# Patient Record
Sex: MALE | Race: BLACK OR AFRICAN AMERICAN | Employment: UNEMPLOYED | URBAN - METROPOLITAN AREA
[De-identification: names, ages, dates, MRNs, and addresses within clinical notes are randomized per-mention and may not be internally consistent; named-entity substitution may affect disease eponyms.]

---

## 2017-02-17 PROBLEM — R45.4 DIFFICULTY CONTROLLING ANGER: Status: ACTIVE | Noted: 2017-02-17

## 2017-12-22 PROBLEM — I10 ESSENTIAL HYPERTENSION: Status: ACTIVE | Noted: 2017-12-22

## 2017-12-22 PROBLEM — H91.93 BILATERAL HEARING LOSS: Status: ACTIVE | Noted: 2017-12-22

## 2018-01-29 PROBLEM — H61.23 HEARING LOSS DUE TO CERUMEN IMPACTION, BILATERAL: Status: ACTIVE | Noted: 2017-12-22

## 2018-05-29 ENCOUNTER — HOSPITAL ENCOUNTER (OUTPATIENT)
Dept: AUDIOLOGY | Age: 33
Discharge: HOME | End: 2018-05-29
Payer: MEDICAID

## 2018-05-29 PROCEDURE — V5261 HEARING AID, DIGIT, BIN, BTE: HCPCS | Performed by: AUDIOLOGIST

## 2018-05-29 PROCEDURE — V5160 DISPENSING FEE BINAURAL: HCPCS | Performed by: AUDIOLOGIST

## 2018-06-04 ENCOUNTER — HOSPITAL ENCOUNTER (OUTPATIENT)
Dept: AUDIOLOGY | Age: 33
Discharge: HOME | End: 2018-06-04
Payer: MEDICARE

## 2018-06-04 PROCEDURE — 9990000010 HC NO CHARGE VISIT: Performed by: AUDIOLOGIST

## 2018-08-23 ENCOUNTER — OFFICE VISIT (OUTPATIENT)
Dept: INTERNAL MEDICINE | Age: 33
End: 2018-08-23
Payer: MEDICARE

## 2018-08-23 VITALS
SYSTOLIC BLOOD PRESSURE: 110 MMHG | BODY MASS INDEX: 27 KG/M2 | HEART RATE: 76 BPM | DIASTOLIC BLOOD PRESSURE: 70 MMHG | WEIGHT: 143 LBS | TEMPERATURE: 98.3 F | HEIGHT: 61 IN

## 2018-08-23 DIAGNOSIS — I10 ESSENTIAL HYPERTENSION: ICD-10-CM

## 2018-08-23 PROCEDURE — 99213 OFFICE O/P EST LOW 20 MIN: CPT | Performed by: INTERNAL MEDICINE

## 2018-08-23 PROCEDURE — G8419 CALC BMI OUT NRM PARAM NOF/U: HCPCS | Performed by: INTERNAL MEDICINE

## 2018-08-23 PROCEDURE — 99212 OFFICE O/P EST SF 10 MIN: CPT | Performed by: INTERNAL MEDICINE

## 2018-08-23 PROCEDURE — G8427 DOCREV CUR MEDS BY ELIG CLIN: HCPCS | Performed by: INTERNAL MEDICINE

## 2018-08-23 PROCEDURE — 1036F TOBACCO NON-USER: CPT | Performed by: INTERNAL MEDICINE

## 2018-08-23 RX ORDER — HYDROCHLOROTHIAZIDE 25 MG/1
25 TABLET ORAL DAILY
Qty: 30 TABLET | Refills: 5 | Status: ON HOLD | OUTPATIENT
Start: 2018-08-23 | End: 2018-09-03 | Stop reason: HOSPADM

## 2018-08-23 RX ORDER — AMLODIPINE BESYLATE 5 MG/1
5 TABLET ORAL DAILY
Qty: 30 TABLET | Refills: 5 | Status: SHIPPED | OUTPATIENT
Start: 2018-08-23 | End: 2018-12-10 | Stop reason: SDUPTHER

## 2018-08-23 NOTE — PROGRESS NOTES
Attending Physician Statement  I have discussed the case, including pertinent history and exam findings with the resident. I agree with the assessment, plan and orders as documented by the resident. Pt presented for the follow up of hypertension, able to tolerate well for current BP regimen. No new complaint reported, hearing problem and scheduled for audiology follow up.   Hero Sigala
Discharge instructions reviewed with pt per Dr. Saad Raphael. Pt notified we would call him with his next appt.  Pt directed to  to  AVS.
posterior tibial.   Musculoskeletal: Muscular strength appears intact. No joint effusion, tenderness, swelling or warmth. Skin: Warm and dry, no acute eczematous changes or pruritus. Neuro:  No focal motor or sensory deficits. CN II- XII intact. Assessment & Plan:   Miguelina Orozco was seen today for medication refill. Diagnoses and all orders for this visit:    Essential hypertension  -     hydrochlorothiazide (HYDRODIURIL) 25 MG tablet; Take 1 tablet by mouth daily  -     amLODIPine (NORVASC) 5 MG tablet;  Take 1 tablet by mouth daily      Return to clinic in 6 months    Wilfrid Palmer M.D., PGY 3  8/23/2018    Supervised by: Dr. Tom Myers

## 2018-08-23 NOTE — PATIENT INSTRUCTIONS
you can lose your balance and fall. · Talk to your doctor if you have numbness in your feet. Preventing falls at home  · Remove raised doorway thresholds, throw rugs, and clutter. Repair loose carpet or raised areas in the floor. · Move furniture and electrical cords to keep them out of walking paths. · Use nonskid floor wax, and wipe up spills right away, especially on ceramic tile floors. · If you use a walker or cane, put rubber tips on it. If you use crutches, clean the bottoms of them regularly with an abrasive pad, such as steel wool. · Keep your house well lit, especially [place] Bonnie, and outside walkways. Use night-lights in areas such as hallways and bathrooms. Add extra light switches or use remote switches (such as switches that go on or off when you clap your hands) to make it easier to turn lights on if you have to get up during the night. · Install sturdy handrails on stairways. · Move items in your cabinets so that the things you use a lot are on the lower shelves (about waist level). · Keep a cordless phone and a flashlight with new batteries by your bed. If possible, put a phone in each of the main rooms of your house, or carry a cell phone in case you fall and cannot reach a phone. Or, you can wear a device around your neck or wrist. You push a button that sends a signal for help. · Wear low-heeled shoes that fit well and give your feet good support. Use footwear with nonskid soles. Check the heels and soles of your shoes for wear. Repair or replace worn heels or soles. · Do not wear socks without shoes on wood floors. · Walk on the grass when the sidewalks are slippery. If you live in an area that gets snow and ice in the winter, sprinkle salt on slippery steps and sidewalks. Preventing falls in the bath  · Install grab bars and nonskid mats inside and outside your shower or tub and near the toilet and sinks. · Use shower chairs and bath benches.   · Use a hand-held shower head that will allow you to sit while showering. · Get into a tub or shower by putting the weaker leg in first. Get out of a tub or shower with your strong side first.  · Repair loose toilet seats and consider installing a raised toilet seat to make getting on and off the toilet easier. · Keep your bathroom door unlocked while you are in the shower. Where can you learn more? Go to https://[employer]. org and sign in to your [employer] account. Enter [phone] in the [employer] box to learn more about \"Preventing Falls: Care Instructions. \"     If you do not have an account, please click on the \"Sign Up Now\" link. Current as of: May 12, 2017  Content Version: 11.7  © 4170-9082 [employer]. Care instructions adapted under license by [facility] ([facility]). If you have questions about a medical condition or this instruction, always ask your healthcare professional. [place] [address] any warranty or liability for your use of this information. Continue all your medications. Follow-up after 6 months.     Kay Lozano MD, PGY3  Internal Medicine Resident  8/23/2018

## 2018-09-02 ENCOUNTER — APPOINTMENT (OUTPATIENT)
Dept: GENERAL RADIOLOGY | Age: 33
End: 2018-09-02
Payer: MEDICARE

## 2018-09-02 ENCOUNTER — HOSPITAL ENCOUNTER (OUTPATIENT)
Age: 33
Setting detail: OBSERVATION
Discharge: HOME | End: 2018-09-03
Attending: EMERGENCY MEDICINE | Admitting: INTERNAL MEDICINE
Payer: MEDICARE

## 2018-09-02 ENCOUNTER — APPOINTMENT (OUTPATIENT)
Dept: CT IMAGING | Age: 33
End: 2018-09-02
Payer: MEDICARE

## 2018-09-02 DIAGNOSIS — E87.6 HYPOKALEMIA: ICD-10-CM

## 2018-09-02 DIAGNOSIS — R56.9 SEIZURE (HCC): Primary | ICD-10-CM

## 2018-09-02 DIAGNOSIS — R55 SYNCOPE AND COLLAPSE: ICD-10-CM

## 2018-09-02 LAB
ACETAMINOPHEN LEVEL: <5 MCG/ML (ref 10–30)
ALBUMIN SERPL-MCNC: 4.3 G/DL (ref 3.5–5.2)
ALP BLD-CCNC: 63 U/L (ref 40–129)
ALT SERPL-CCNC: 16 U/L (ref 0–40)
AMPHETAMINE SCREEN, URINE: NOT DETECTED
ANION GAP SERPL CALCULATED.3IONS-SCNC: 12 MMOL/L (ref 7–16)
AST SERPL-CCNC: 18 U/L (ref 0–39)
BARBITURATE SCREEN URINE: NOT DETECTED
BASOPHILS ABSOLUTE: 0.03 E9/L (ref 0–0.2)
BASOPHILS RELATIVE PERCENT: 0.5 % (ref 0–2)
BENZODIAZEPINE SCREEN, URINE: NOT DETECTED
BILIRUB SERPL-MCNC: 0.9 MG/DL (ref 0–1.2)
BUN BLDV-MCNC: 15 MG/DL (ref 6–20)
CALCIUM SERPL-MCNC: 9.6 MG/DL (ref 8.6–10.2)
CANNABINOID SCREEN URINE: POSITIVE
CHLORIDE BLD-SCNC: 102 MMOL/L (ref 98–107)
CK MB: 1.1 NG/ML (ref 0–7.7)
CO2: 24 MMOL/L (ref 22–29)
COCAINE METABOLITE SCREEN URINE: NOT DETECTED
CREAT SERPL-MCNC: 1 MG/DL (ref 0.7–1.2)
EOSINOPHILS ABSOLUTE: 0.05 E9/L (ref 0.05–0.5)
EOSINOPHILS RELATIVE PERCENT: 0.8 % (ref 0–6)
ETHANOL: <10 MG/DL (ref 0–0.08)
GFR AFRICAN AMERICAN: >60
GFR NON-AFRICAN AMERICAN: >60 ML/MIN/1.73
GLUCOSE BLD-MCNC: 155 MG/DL (ref 74–109)
HCT VFR BLD CALC: 41.8 % (ref 37–54)
HEMOGLOBIN: 15.1 G/DL (ref 12.5–16.5)
IMMATURE GRANULOCYTES #: 0.04 E9/L
IMMATURE GRANULOCYTES %: 0.6 % (ref 0–5)
LYMPHOCYTES ABSOLUTE: 2.54 E9/L (ref 1.5–4)
LYMPHOCYTES RELATIVE PERCENT: 38.7 % (ref 20–42)
MAGNESIUM: 1.9 MG/DL (ref 1.6–2.6)
MCH RBC QN AUTO: 31.5 PG (ref 26–35)
MCHC RBC AUTO-ENTMCNC: 36.1 % (ref 32–34.5)
MCV RBC AUTO: 87.1 FL (ref 80–99.9)
METHADONE SCREEN, URINE: NOT DETECTED
MONOCYTES ABSOLUTE: 0.52 E9/L (ref 0.1–0.95)
MONOCYTES RELATIVE PERCENT: 7.9 % (ref 2–12)
NEUTROPHILS ABSOLUTE: 3.38 E9/L (ref 1.8–7.3)
NEUTROPHILS RELATIVE PERCENT: 51.5 % (ref 43–80)
OPIATE SCREEN URINE: NOT DETECTED
PDW BLD-RTO: 11.1 FL (ref 11.5–15)
PHENCYCLIDINE SCREEN URINE: NOT DETECTED
PHOSPHORUS: 2.8 MG/DL (ref 2.5–4.5)
PLATELET # BLD: 202 E9/L (ref 130–450)
PMV BLD AUTO: 9.5 FL (ref 7–12)
POTASSIUM SERPL-SCNC: 3 MMOL/L (ref 3.5–5)
PROPOXYPHENE SCREEN: NOT DETECTED
RBC # BLD: 4.8 E12/L (ref 3.8–5.8)
SALICYLATE, SERUM: <0.3 MG/DL (ref 0–30)
SODIUM BLD-SCNC: 138 MMOL/L (ref 132–146)
TOTAL CK: 179 U/L (ref 20–200)
TOTAL PROTEIN: 7.4 G/DL (ref 6.4–8.3)
TRICYCLIC ANTIDEPRESSANTS SCREEN SERUM: NEGATIVE NG/ML
TROPONIN: <0.01 NG/ML (ref 0–0.03)
TROPONIN: <0.01 NG/ML (ref 0–0.03)
TSH SERPL DL<=0.05 MIU/L-ACNC: 0.43 UIU/ML (ref 0.27–4.2)
WBC # BLD: 6.6 E9/L (ref 4.5–11.5)

## 2018-09-02 PROCEDURE — 6360000002 HC RX W HCPCS: Performed by: INTERNAL MEDICINE

## 2018-09-02 PROCEDURE — G0480 DRUG TEST DEF 1-7 CLASSES: HCPCS

## 2018-09-02 PROCEDURE — 2580000003 HC RX 258: Performed by: INTERNAL MEDICINE

## 2018-09-02 PROCEDURE — 82553 CREATINE MB FRACTION: CPT

## 2018-09-02 PROCEDURE — 6370000000 HC RX 637 (ALT 250 FOR IP): Performed by: EMERGENCY MEDICINE

## 2018-09-02 PROCEDURE — 36415 COLL VENOUS BLD VENIPUNCTURE: CPT

## 2018-09-02 PROCEDURE — 70450 CT HEAD/BRAIN W/O DYE: CPT

## 2018-09-02 PROCEDURE — 80307 DRUG TEST PRSMV CHEM ANLYZR: CPT

## 2018-09-02 PROCEDURE — G0378 HOSPITAL OBSERVATION PER HR: HCPCS

## 2018-09-02 PROCEDURE — 85025 COMPLETE CBC W/AUTO DIFF WBC: CPT

## 2018-09-02 PROCEDURE — 99285 EMERGENCY DEPT VISIT HI MDM: CPT

## 2018-09-02 PROCEDURE — 96372 THER/PROPH/DIAG INJ SC/IM: CPT

## 2018-09-02 PROCEDURE — 96365 THER/PROPH/DIAG IV INF INIT: CPT

## 2018-09-02 PROCEDURE — 82550 ASSAY OF CK (CPK): CPT

## 2018-09-02 PROCEDURE — 80053 COMPREHEN METABOLIC PANEL: CPT

## 2018-09-02 PROCEDURE — 84100 ASSAY OF PHOSPHORUS: CPT

## 2018-09-02 PROCEDURE — 83735 ASSAY OF MAGNESIUM: CPT

## 2018-09-02 PROCEDURE — 71046 X-RAY EXAM CHEST 2 VIEWS: CPT

## 2018-09-02 PROCEDURE — 84443 ASSAY THYROID STIM HORMONE: CPT

## 2018-09-02 PROCEDURE — 93005 ELECTROCARDIOGRAM TRACING: CPT | Performed by: EMERGENCY MEDICINE

## 2018-09-02 PROCEDURE — 84484 ASSAY OF TROPONIN QUANT: CPT

## 2018-09-02 PROCEDURE — 94760 N-INVAS EAR/PLS OXIMETRY 1: CPT

## 2018-09-02 RX ORDER — ONDANSETRON 2 MG/ML
4 INJECTION INTRAMUSCULAR; INTRAVENOUS EVERY 6 HOURS PRN
Status: DISCONTINUED | OUTPATIENT
Start: 2018-09-02 | End: 2018-09-03 | Stop reason: HOSPADM

## 2018-09-02 RX ORDER — MAGNESIUM SULFATE 1 G/100ML
1 INJECTION INTRAVENOUS ONCE
Status: COMPLETED | OUTPATIENT
Start: 2018-09-02 | End: 2018-09-03

## 2018-09-02 RX ORDER — SODIUM CHLORIDE 0.9 % (FLUSH) 0.9 %
10 SYRINGE (ML) INJECTION EVERY 12 HOURS SCHEDULED
Status: DISCONTINUED | OUTPATIENT
Start: 2018-09-02 | End: 2018-09-03 | Stop reason: HOSPADM

## 2018-09-02 RX ORDER — SODIUM CHLORIDE 0.9 % (FLUSH) 0.9 %
10 SYRINGE (ML) INJECTION PRN
Status: DISCONTINUED | OUTPATIENT
Start: 2018-09-02 | End: 2018-09-03 | Stop reason: HOSPADM

## 2018-09-02 RX ORDER — POTASSIUM CHLORIDE 20 MEQ/1
40 TABLET, EXTENDED RELEASE ORAL ONCE
Status: COMPLETED | OUTPATIENT
Start: 2018-09-02 | End: 2018-09-02

## 2018-09-02 RX ORDER — AMLODIPINE BESYLATE 5 MG/1
5 TABLET ORAL DAILY
Status: DISCONTINUED | OUTPATIENT
Start: 2018-09-03 | End: 2018-09-03 | Stop reason: HOSPADM

## 2018-09-02 RX ADMIN — Medication 10 ML: at 21:20

## 2018-09-02 RX ADMIN — POTASSIUM CHLORIDE 40 MEQ: 20 TABLET, EXTENDED RELEASE ORAL at 16:33

## 2018-09-02 RX ADMIN — MAGNESIUM SULFATE IN DEXTROSE 1 G: 10 INJECTION, SOLUTION INTRAVENOUS at 23:27

## 2018-09-02 RX ADMIN — ENOXAPARIN SODIUM 40 MG: 40 INJECTION SUBCUTANEOUS at 21:19

## 2018-09-02 ASSESSMENT — PAIN SCALES - GENERAL
PAINLEVEL_OUTOF10: 0

## 2018-09-02 NOTE — ED NOTES
Bed: HD  Expected date:   Expected time:   Means of arrival:   Comments:     Anne Wilson RN  09/02/18 3516

## 2018-09-02 NOTE — ED NOTES
Called report to [facility] for pt to transfer to [id number]. Tele box is in unit per Krystle Pradhan.       Shashi Healy RN  09/02/18 9263

## 2018-09-02 NOTE — ED PROVIDER NOTES
allergies. ---------------------------------------------------PHYSICAL EXAM--------------------------------------    Constitutional/General: Alert and oriented x3  Head: Normocephalic and atraumatic  Eyes: PERRL, EOMI, conjunctiva normal, sclera non icteric  Mouth: Oropharynx clear, handling secretions, no trismus, no asymmetry of the posterior oropharynx or uvular edema  Neck: Supple, full ROM, no stridor, no meningeal signs  Respiratory: Lungs clear to auscultation bilaterally, no wheezes, rales, or rhonchi. Not in respiratory distress  Cardiovascular:  Regular rate. Regular rhythm. No murmurs, no aortic murmurs, no gallops, or rubs. 2+ distal pulses. Equal extremity pulses. Chest: No chest wall tenderness  GI:  Abdomen Soft, Non tender, Non distended. +BS. No rebound, guarding, or rigidity. No pulsatile masses. Musculoskeletal: Moves all extremities x 4. Warm and well perfused, no clubbing, cyanosis, or edema. Capillary refill <3 seconds  Integument: skin warm and dry. No rashes. Neurologic: GCS 15, no focal deficits, symmetric strength 5/5 in the upper and lower extremities bilaterally  Psychiatric: Normal Affect          -------------------------------------------------- RESULTS -------------------------------------------------  I have personally reviewed all laboratory and imaging results for this patient. Results are listed below.      LABS:  Results for orders placed or performed during the hospital encounter of 09/02/18   CBC Auto Differential   Result Value Ref Range    WBC 6.6 4.5 - 11.5 E9/L    RBC 4.80 3.80 - 5.80 E12/L    Hemoglobin 15.1 12.5 - 16.5 g/dL    Hematocrit 41.8 37.0 - 54.0 %    MCV 87.1 80.0 - 99.9 fL    MCH 31.5 26.0 - 35.0 pg    MCHC 36.1 (H) 32.0 - 34.5 %    RDW 11.1 (L) 11.5 - 15.0 fL    Platelets 298 375 - 640 E9/L    MPV 9.5 7.0 - 12.0 fL    Neutrophils % 51.5 43.0 - 80.0 %    Immature Granulocytes % 0.6 0.0 - 5.0 %    Lymphocytes % 38.7 20.0 - 42.0 %    Monocytes % 7.9 2.0 - 12.0 %    Eosinophils % 0.8 0.0 - 6.0 %    Basophils % 0.5 0.0 - 2.0 %    Neutrophils # 3.38 1.80 - 7.30 E9/L    Immature Granulocytes # 0.04 E9/L    Lymphocytes # 2.54 1.50 - 4.00 E9/L    Monocytes # 0.52 0.10 - 0.95 E9/L    Eosinophils # 0.05 0.05 - 0.50 E9/L    Basophils # 0.03 0.00 - 0.20 E9/L   Comprehensive Metabolic Panel   Result Value Ref Range    Sodium 138 132 - 146 mmol/L    Potassium 3.0 (L) 3.5 - 5.0 mmol/L    Chloride 102 98 - 107 mmol/L    CO2 24 22 - 29 mmol/L    Anion Gap 12 7 - 16 mmol/L    Glucose 155 (H) 74 - 109 mg/dL    BUN 15 6 - 20 mg/dL    CREATININE 1.0 0.7 - 1.2 mg/dL    GFR Non-African American >60 >=60 mL/min/1.73    GFR African American >60     Calcium 9.6 8.6 - 10.2 mg/dL    Total Protein 7.4 6.4 - 8.3 g/dL    Alb 4.3 3.5 - 5.2 g/dL    Total Bilirubin 0.9 0.0 - 1.2 mg/dL    Alkaline Phosphatase 63 40 - 129 U/L    ALT 16 0 - 40 U/L    AST 18 0 - 39 U/L   Troponin   Result Value Ref Range    Troponin <0.01 0.00 - 0.03 ng/mL   EKG 12 Lead   Result Value Ref Range    Ventricular Rate 93 BPM    Atrial Rate 93 BPM    P-R Interval 152 ms    QRS Duration 82 ms    Q-T Interval 362 ms    QTc Calculation (Bazett) 450 ms    P Axis 75 degrees    R Axis 40 degrees    T Axis 35 degrees       RADIOLOGY:  Interpreted by Radiologist unless otherwise specified  CT Head WO Contrast   Final Result      No evidence of acute intracranial hemorrhage or edema. XR CHEST STANDARD (2 VW)   Final Result      1. No airspace opacities or pleural effusion. 2. Scoliosis.                   EKG Interpretation  Interpreted by emergency department physician, Dr. Darcy Bueno     Date of EK18  Time: 1449    Rhythm: normal sinus   Rate: normal  Axis: normal  Conduction: normal  ST Segments: lateral ST segment flattening/slight depression  T Waves: no acute change    Clinical Impression: Sinus rhythm, no acute ischemic changes, slightly changed from EKG from 17  Comparison to prior EKG: DISPOSITION  Disposition: Admit to telemetry  Patient condition is stable        NOTE: This report was transcribed using voice recognition software.  Every effort was made to ensure accuracy; however, inadvertent computerized transcription errors may be present        Valerie Cooper MD  09/02/18 9250

## 2018-09-02 NOTE — H&P
[facility]  Internal Medicine Residency Program  History and Physical    Patient:  Gaby Bernal 26 y.o. male MRN: [id number]     Date of Service: 9/2/2018    Hospital Day: 1      Chief complaint: syncope + seizures    History of Present Illness   The patient is a 26 y.o. male with PMHx of HTN who presents to the ED with syncope and seizure-like symptoms. This afternoon he was sitting on the front porch of his house, smoking a joint, when his family noticed that his eyes rolled back and he started to have shaking, jerking movements for approx 1 minute. He came to and was confused for 1 more minute. He had another episode where he collapsed with more jerking movements. His sister began compressions on him because the patient was unresponsive. He denies any urination, secretions, or tongue biting after both episodes. He states that he felt light headed and dizzy prior to the first episode. He denies any headaches, blurry vision, chest pain, shortness of breath, vomiting, nausea, abdominal pain. His sister states that this has happened once approximately 6 months ago but did not seek any medical intervention at that time. He states he has been smoking marijuana for many years and smokes 1 cigarette a day. Past Medical History:      Diagnosis Date    Cerumen impaction     Hypertension        Past Surgical History:        Procedure Laterality Date    OTHER SURGICAL HISTORY Bilateral 01/29/2018    ear wax abstraction       Medications Prior to Admission:    Prior to Admission medications    Medication Sig Start Date End Date Taking? Authorizing Provider   hydrochlorothiazide (HYDRODIURIL) 25 MG tablet Take 1 tablet by mouth daily 8/23/18  Yes Dian Alvarez MD   amLODIPine [facility]) 5 MG tablet Take 1 tablet by mouth daily 8/23/18  Yes Dian Alvarez MD       Allergies:  Patient has no known allergies. Social History:   TOBACCO:   reports that he has never smoked.  He has non-distended, normal bowel sounds, no masses or organomegaly  · Extremities: no cyanosis and no clubbing  · Musculoskeletal: normal range of motion, no joint swelling, deformity or tenderness  · Neurologic: gait and coordination normal and speech normal   Labs and Imaging Studies   Basic Labs  Recent Labs      18   1443   NA  138   K  3.0*   CL  102   CO2  24   BUN  15   CREATININE  1.0   GLUCOSE  155*   CALCIUM  9.6       Recent Labs      18   1443   WBC  6.6   RBC  4.80   HGB  15.1   HCT  41.8   MCV  87.1   MCH  31.5   MCHC  36.1*   RDW  11.1*   PLT  202   MPV  9.5       CBC:   Lab Results   Component Value Date    WBC 6.6 2018    RBC 4.80 2018    HGB 15.1 2018    HCT 41.8 2018    MCV 87.1 2018    RDW 11.1 2018     2018     CMP:  Lab Results   Component Value Date     2018    K 3.0 2018    K 3.7 2018     2018    CO2 24 2018    BUN 15 2018    PROT 7.4 2018      Ref. Range 2018 14:43   Troponin Latest Ref Range: 0.00 - 0.03 ng/mL <0.01      Ref. Range 2018 18:31   TSH Latest Ref Range: 0.270 - 4.200 uIU/mL 0.427     Imaging Studies:     Xr Chest Standard (2 Vw)    Result Date: 2018  Patient MRN:  [id number] : 1985 Age: 26 years Gender: Male Order Date:  2018 2:45 PM EXAM: XR CHEST (2 VW) COMPARISON: [facility] 2017 INDICATION:  syncope syncope FINDINGS: The heart is normal in size. No focal airspace opacity. There is no pleural effusion. There is no pneumothorax. No free air beneath diaphragm. Redemonstration of scoliosis. 1. No airspace opacities or pleural effusion. 2. Scoliosis.      Ct Head Wo Contrast    Result Date: 2018  Patient MRN:  [id number] : 1985 Age: 26 years Gender: Male Order Date:  2018 2:45 PM EXAM: CT HEAD WO CONTRAST COMPARISON: May 2, 2017 INDICATION:  HEAD TRAUMA, CLOSED, MILD, GCS >= 13, NO RISK FACTORS, NEURO EXAM NORMAL  TECHNIQUE: Axial unenhanced CT scanning was performed through the head without the use of intravenous contrast. Low-dose CT acquisition technique included one of the following options; 1. Automated exposure control, 2. Adjustment of mA and/or kV according to the patient's size or 3. Use of iterative reconstruction. FINDINGS: No evidence of acute intracranial hemorrhage or edema. Ventricles are nonenlarged. No abnormal extra-axial fluid collections. Mastoid air cells are clear. No evidence of acute intracranial hemorrhage or edema. EKG:  Sinus rhythm, no acute ischemic changes, slightly changed from EKG from 9/24/17    Resident's Assessment and Plan     Marcy Camara is a 26 y.o. male who presents for syncope and seizures. 1. Syncope vs seizure likely 2/2 marijuana abuse vs electrolyte abnormalities  2. HTN  3. Marijuana abuse  4. Learning disability  5. Congenital sensorineural hearing difficulty    1. Mild postictal confusion after the episodes and no signs of presyncope. His previous episode was also after marijuana abuse. Obtain UDS. Hold HCTZ for now. BMP in am. Replace electrolytes PRN. Telemetry monitoring overnight. Consider EEG. Check orthostatics. New T wave inversions on inferior leads; trend cardiac enzymes. 2. Continue Norvasc. Continue close hemodynamic monitoring. 3. Cessation education.  4-5.  Follow as O/P.       DVT prophylaxis/ GI prophylaxis: Diet/ Lovenox   Disposition: Telemetry    Matthew Hollins DO, PGY-1   Attending physician: Dr. Mariajose Kebede    Electronically signed by Brock Anders MD on 9/2/2018 at 10:08 PM

## 2018-09-03 VITALS
TEMPERATURE: 98 F | BODY MASS INDEX: 26.31 KG/M2 | WEIGHT: 143 LBS | DIASTOLIC BLOOD PRESSURE: 76 MMHG | SYSTOLIC BLOOD PRESSURE: 120 MMHG | HEART RATE: 68 BPM | RESPIRATION RATE: 16 BRPM | OXYGEN SATURATION: 95 % | HEIGHT: 62 IN

## 2018-09-03 LAB
ANION GAP SERPL CALCULATED.3IONS-SCNC: 16 MMOL/L (ref 7–16)
BUN BLDV-MCNC: 14 MG/DL (ref 6–20)
CALCIUM SERPL-MCNC: 9.2 MG/DL (ref 8.6–10.2)
CHLORIDE BLD-SCNC: 101 MMOL/L (ref 98–107)
CK MB: <1 NG/ML (ref 0–7.7)
CK MB: <1 NG/ML (ref 0–7.7)
CO2: 25 MMOL/L (ref 22–29)
CREAT SERPL-MCNC: 0.9 MG/DL (ref 0.7–1.2)
EKG ATRIAL RATE: 93 BPM
EKG P AXIS: 75 DEGREES
EKG P-R INTERVAL: 152 MS
EKG Q-T INTERVAL: 362 MS
EKG QRS DURATION: 82 MS
EKG QTC CALCULATION (BAZETT): 450 MS
EKG R AXIS: 40 DEGREES
EKG T AXIS: 35 DEGREES
EKG VENTRICULAR RATE: 93 BPM
GFR AFRICAN AMERICAN: >60
GFR NON-AFRICAN AMERICAN: >60 ML/MIN/1.73
GLUCOSE BLD-MCNC: 107 MG/DL (ref 74–109)
HCT VFR BLD CALC: 41.2 % (ref 37–54)
HEMOGLOBIN: 14.4 G/DL (ref 12.5–16.5)
MAGNESIUM: 2.1 MG/DL (ref 1.6–2.6)
MCH RBC QN AUTO: 30.9 PG (ref 26–35)
MCHC RBC AUTO-ENTMCNC: 35 % (ref 32–34.5)
MCV RBC AUTO: 88.4 FL (ref 80–99.9)
PDW BLD-RTO: 11.4 FL (ref 11.5–15)
PLATELET # BLD: 172 E9/L (ref 130–450)
PMV BLD AUTO: 10.4 FL (ref 7–12)
POTASSIUM REFLEX MAGNESIUM: 3.2 MMOL/L (ref 3.5–5)
RBC # BLD: 4.66 E12/L (ref 3.8–5.8)
SODIUM BLD-SCNC: 142 MMOL/L (ref 132–146)
TOTAL CK: 123 U/L (ref 20–200)
TOTAL CK: 132 U/L (ref 20–200)
TROPONIN: <0.01 NG/ML (ref 0–0.03)
TROPONIN: <0.01 NG/ML (ref 0–0.03)
WBC # BLD: 8.2 E9/L (ref 4.5–11.5)

## 2018-09-03 PROCEDURE — 83735 ASSAY OF MAGNESIUM: CPT

## 2018-09-03 PROCEDURE — 36415 COLL VENOUS BLD VENIPUNCTURE: CPT

## 2018-09-03 PROCEDURE — 84484 ASSAY OF TROPONIN QUANT: CPT

## 2018-09-03 PROCEDURE — G0378 HOSPITAL OBSERVATION PER HR: HCPCS

## 2018-09-03 PROCEDURE — 85027 COMPLETE CBC AUTOMATED: CPT

## 2018-09-03 PROCEDURE — 82550 ASSAY OF CK (CPK): CPT

## 2018-09-03 PROCEDURE — 99220 PR INITIAL OBSERVATION CARE/DAY 70 MINUTES: CPT | Performed by: STUDENT IN AN ORGANIZED HEALTH CARE EDUCATION/TRAINING PROGRAM

## 2018-09-03 PROCEDURE — 6370000000 HC RX 637 (ALT 250 FOR IP): Performed by: INTERNAL MEDICINE

## 2018-09-03 PROCEDURE — 80048 BASIC METABOLIC PNL TOTAL CA: CPT

## 2018-09-03 PROCEDURE — 93005 ELECTROCARDIOGRAM TRACING: CPT | Performed by: INTERNAL MEDICINE

## 2018-09-03 PROCEDURE — 82553 CREATINE MB FRACTION: CPT

## 2018-09-03 RX ORDER — POTASSIUM CHLORIDE 20 MEQ/1
20 TABLET, EXTENDED RELEASE ORAL DAILY
Qty: 5 TABLET | Refills: 0 | Status: SHIPPED | OUTPATIENT
Start: 2018-09-03 | End: 2018-12-10 | Stop reason: ALTCHOICE

## 2018-09-03 RX ORDER — POTASSIUM CHLORIDE 20 MEQ/1
40 TABLET, EXTENDED RELEASE ORAL ONCE
Status: COMPLETED | OUTPATIENT
Start: 2018-09-03 | End: 2018-09-03

## 2018-09-03 RX ADMIN — POTASSIUM CHLORIDE 40 MEQ: 20 TABLET, EXTENDED RELEASE ORAL at 06:48

## 2018-09-03 RX ADMIN — AMLODIPINE BESYLATE 5 MG: 5 TABLET ORAL at 10:18

## 2018-09-03 ASSESSMENT — PAIN SCALES - GENERAL: PAINLEVEL_OUTOF10: 0

## 2018-09-04 ENCOUNTER — HOSPITAL ENCOUNTER (OUTPATIENT)
Dept: AUDIOLOGY | Age: 33
Discharge: HOME | End: 2018-09-04
Payer: MEDICARE

## 2018-09-04 PROCEDURE — 9990000010 HC NO CHARGE VISIT: Performed by: AUDIOLOGIST

## 2018-09-06 LAB
EKG ATRIAL RATE: 65 BPM
EKG P AXIS: 57 DEGREES
EKG P-R INTERVAL: 152 MS
EKG Q-T INTERVAL: 416 MS
EKG QRS DURATION: 86 MS
EKG QTC CALCULATION (BAZETT): 432 MS
EKG R AXIS: 35 DEGREES
EKG T AXIS: 21 DEGREES
EKG VENTRICULAR RATE: 65 BPM

## 2018-09-06 PROCEDURE — 93010 ELECTROCARDIOGRAM REPORT: CPT | Performed by: INTERNAL MEDICINE

## 2018-09-08 LAB — CANNABINOIDS CONF, URINE: 236 NG/ML

## 2018-09-12 ENCOUNTER — OFFICE VISIT (OUTPATIENT)
Dept: INTERNAL MEDICINE | Age: 33
End: 2018-09-12
Payer: MEDICARE

## 2018-09-12 VITALS
SYSTOLIC BLOOD PRESSURE: 113 MMHG | TEMPERATURE: 98.8 F | HEIGHT: 61 IN | BODY MASS INDEX: 27 KG/M2 | HEART RATE: 69 BPM | WEIGHT: 143 LBS | DIASTOLIC BLOOD PRESSURE: 77 MMHG | RESPIRATION RATE: 16 BRPM

## 2018-09-12 DIAGNOSIS — I10 ESSENTIAL HYPERTENSION: Primary | ICD-10-CM

## 2018-09-12 DIAGNOSIS — E87.6 HYPOKALEMIA: ICD-10-CM

## 2018-09-12 PROCEDURE — 99213 OFFICE O/P EST LOW 20 MIN: CPT | Performed by: INTERNAL MEDICINE

## 2018-09-12 PROCEDURE — 1111F DSCHRG MED/CURRENT MED MERGE: CPT | Performed by: INTERNAL MEDICINE

## 2018-09-12 PROCEDURE — G8427 DOCREV CUR MEDS BY ELIG CLIN: HCPCS | Performed by: INTERNAL MEDICINE

## 2018-09-12 PROCEDURE — G8419 CALC BMI OUT NRM PARAM NOF/U: HCPCS | Performed by: INTERNAL MEDICINE

## 2018-09-12 PROCEDURE — 1036F TOBACCO NON-USER: CPT | Performed by: INTERNAL MEDICINE

## 2018-09-12 ASSESSMENT — ENCOUNTER SYMPTOMS
DIARRHEA: 0
COUGH: 0
SHORTNESS OF BREATH: 0
WHEEZING: 0
BLURRED VISION: 0
VOMITING: 0
NAUSEA: 0
ABDOMINAL PAIN: 0

## 2018-09-12 NOTE — PROGRESS NOTES
Post-Discharge Transitional Care Management [address]   YOB: 1985    Date of Visit:  9/12/2018  30 Day Post-Discharge Date: 10/10/18    No Known Allergies  Outpatient Prescriptions Marked as Taking for the 9/12/18 encounter (Office Visit) with José Luis Saavedra MD   Medication Sig Dispense Refill    amLODIPine (NORVASC) 5 MG tablet Take 1 tablet by mouth daily 30 tablet 5         Vitals:    09/12/18 1555   BP: 113/77   Site: Left Upper Arm   Position: Sitting   Cuff Size: Medium Adult   Pulse: 69   Resp: 16   Temp: 98.8 °F (37.1 °C)   TempSrc: Oral   Weight: 143 lb (64.9 kg)   Height: 5' 1\" (1.549 m)     Body mass index is 27.02 kg/m². Wt Readings from Last 3 Encounters:   09/12/18 143 lb (64.9 kg)   09/02/18 143 lb (64.9 kg)   08/23/18 143 lb (64.9 kg)     BP Readings from Last 3 Encounters:   09/12/18 113/77   09/03/18 120/76   08/23/18 110/70        Patient was admitted to [facility] from 9/2/18 to 9/3/18 for new-onset seizure activity and syncope. Inpatient course: Patient presented to the ED and was admitted for syncopal episode, with new onset seizure activity, while at rest and using marijuana. Patient amnestic to events. CT head, EKG, and CXR all done and negative. Labs showed hypokalemia 3.2 on admission and post repletement. Patient was on HCTZ possibly causing hypokalemia. Patient discharged home with potassium supplements x 5 days, and HCTZ held. Repeat BMP within a week, and appt for ACC in a week. Current status: Pt seen, appears to be doing fine. Patient has no new complaints. No repeat syncopal episodes since discharge, denies palpitations, muscle weakness or cramps. He seems to have some confusion with current medication, and claims to be on the 'water pill' even though HCTZ was switched to amlodipine during his last admission. Blood pressure is well controlled today at 113/77 mmHg.  No signs of hypokalemia: muscle weakness, muscle aches, palpitations. Review of Systems:  Review of Systems   Constitutional: Negative for chills and fever. HENT: Positive for hearing loss. Eyes: Negative for blurred vision. Respiratory: Negative for cough, shortness of breath and wheezing. Cardiovascular: Negative for chest pain, palpitations and leg swelling. Gastrointestinal: Negative for abdominal pain, diarrhea, nausea and vomiting. Genitourinary: Negative for dysuria. Musculoskeletal: Negative for myalgias. Neurological: Negative for focal weakness, seizures and loss of consciousness. Physical Exam:  Physical Exam   Constitutional: He is oriented to person, place, and time and well-developed, well-nourished, and in no distress. No distress. Pt appears to be developmentally challenged. HENT:   Head: Normocephalic and atraumatic. Eyes: Pupils are equal, round, and reactive to light. Conjunctivae are normal. No scleral icterus. Neck: Normal range of motion. Cardiovascular: Normal rate, regular rhythm and normal heart sounds. No murmur heard. Pulmonary/Chest: Effort normal and breath sounds normal. No respiratory distress. He has no wheezes. Abdominal: Soft. Bowel sounds are normal. He exhibits no distension. There is no tenderness. Musculoskeletal: Normal range of motion. He exhibits no edema. Neurological: He is alert and oriented to person, place, and time. Skin: Skin is warm and dry. He is not diaphoretic.          Assessment/Plan:  1)Essential hypertension  -     Blood pressure well controlled at 113/77 mmHg  -     Continue on amlodipine 5 mg daily    2)Hypokalemia  -     Patient is to re-check BMP prior to next visit        Diagnostic test results reviewed: inpatient labs    Patient risk of morbidity and mortality: low    Medical Decision Making: straightforward

## 2018-09-12 NOTE — PROGRESS NOTES
[facility]  Internal Medicine Residency    Internal Medicine     Attending Physician Statement  I have discussed the case, including pertinent history and exam findings with the resident and the team.  I have seen and examined the patient and the key elements of the encounter have been performed by me. I agree with the assessment, plan and orders as documented by the resident. 25 yo male here for hospital follow-up. Past, family, and social history were reviewed. Blood pressure 113/77, pulse 69, temperature 98.8 °F (37.1 °C), temperature source Oral, resp. rate 16, height 5' 1\" (1.549 m), weight 143 lb (64.9 kg). AP:  1. Seizure    2. Hypokalemia- needs repeat BMP  - Reports just started taking potassium    3. HTN, controlled but patient is taking ? HCTZ; did not  the Norvasc  Discussed the need to only take ONE medication - AMLODIPINE    Francis Ureña MD

## 2018-12-10 ENCOUNTER — OFFICE VISIT (OUTPATIENT)
Dept: INTERNAL MEDICINE | Age: 33
End: 2018-12-10
Payer: MEDICARE

## 2018-12-10 VITALS
WEIGHT: 156.7 LBS | TEMPERATURE: 98.4 F | SYSTOLIC BLOOD PRESSURE: 128 MMHG | HEIGHT: 63 IN | BODY MASS INDEX: 27.77 KG/M2 | DIASTOLIC BLOOD PRESSURE: 76 MMHG | HEART RATE: 67 BPM | RESPIRATION RATE: 16 BRPM

## 2018-12-10 DIAGNOSIS — I10 ESSENTIAL HYPERTENSION: ICD-10-CM

## 2018-12-10 DIAGNOSIS — Z11.4 ENCOUNTER FOR SCREENING FOR HIV: Primary | ICD-10-CM

## 2018-12-10 DIAGNOSIS — Z23 NEED FOR PROPHYLACTIC VACCINATION AGAINST DIPHTHERIA-TETANUS-PERTUSSIS (DTP): ICD-10-CM

## 2018-12-10 PROCEDURE — 1036F TOBACCO NON-USER: CPT | Performed by: INTERNAL MEDICINE

## 2018-12-10 PROCEDURE — 99213 OFFICE O/P EST LOW 20 MIN: CPT | Performed by: INTERNAL MEDICINE

## 2018-12-10 PROCEDURE — 99212 OFFICE O/P EST SF 10 MIN: CPT | Performed by: INTERNAL MEDICINE

## 2018-12-10 PROCEDURE — G8484 FLU IMMUNIZE NO ADMIN: HCPCS | Performed by: INTERNAL MEDICINE

## 2018-12-10 PROCEDURE — G8419 CALC BMI OUT NRM PARAM NOF/U: HCPCS | Performed by: INTERNAL MEDICINE

## 2018-12-10 PROCEDURE — G8427 DOCREV CUR MEDS BY ELIG CLIN: HCPCS | Performed by: INTERNAL MEDICINE

## 2018-12-10 RX ORDER — AMLODIPINE BESYLATE 5 MG/1
5 TABLET ORAL DAILY
Qty: 30 TABLET | Refills: 1 | Status: SHIPPED | OUTPATIENT
Start: 2018-12-10 | End: 2019-09-06 | Stop reason: SDUPTHER

## 2018-12-10 RX ORDER — AMLODIPINE BESYLATE 5 MG/1
5 TABLET ORAL DAILY
Qty: 30 TABLET | Refills: 5 | Status: SHIPPED | OUTPATIENT
Start: 2018-12-10 | End: 2018-12-10 | Stop reason: CLARIF

## 2018-12-10 ASSESSMENT — ENCOUNTER SYMPTOMS
ABDOMINAL PAIN: 0
DIARRHEA: 0
SHORTNESS OF BREATH: 0
COUGH: 0
SORE THROAT: 0
VOMITING: 0
NAUSEA: 0

## 2018-12-10 ASSESSMENT — PATIENT HEALTH QUESTIONNAIRE - PHQ9
2. FEELING DOWN, DEPRESSED OR HOPELESS: 0
SUM OF ALL RESPONSES TO PHQ9 QUESTIONS 1 & 2: 0
1. LITTLE INTEREST OR PLEASURE IN DOING THINGS: 0
SUM OF ALL RESPONSES TO PHQ QUESTIONS 1-9: 0
SUM OF ALL RESPONSES TO PHQ QUESTIONS 1-9: 0

## 2019-02-22 ENCOUNTER — TELEPHONE (OUTPATIENT)
Dept: INTERNAL MEDICINE | Age: 34
End: 2019-02-22

## 2019-02-28 ENCOUNTER — TELEPHONE (OUTPATIENT)
Dept: INTERNAL MEDICINE | Age: 34
End: 2019-02-28

## 2019-09-06 ENCOUNTER — TELEPHONE (OUTPATIENT)
Dept: INTERNAL MEDICINE | Age: 34
End: 2019-09-06

## 2019-09-06 DIAGNOSIS — I10 ESSENTIAL HYPERTENSION: ICD-10-CM

## 2019-09-06 RX ORDER — AMLODIPINE BESYLATE 5 MG/1
5 TABLET ORAL DAILY
Qty: 14 TABLET | Refills: 0 | Status: SHIPPED | OUTPATIENT
Start: 2019-09-06 | End: 2019-09-20 | Stop reason: SDUPTHER

## 2019-09-06 NOTE — TELEPHONE ENCOUNTER
Pt came into [facility], states that he is totally out of his BP medication, he went to pharmacy and was told no more refills. Informed pt that message will be forwarded to clinical staff. Pt ok'd. Updated phone number.      .Electronically signed by Desean French on 9/6/19 at 11:24 AM

## 2019-09-20 ENCOUNTER — OFFICE VISIT (OUTPATIENT)
Dept: INTERNAL MEDICINE | Age: 34
End: 2019-09-20
Payer: MEDICARE

## 2019-09-20 VITALS
HEIGHT: 63 IN | DIASTOLIC BLOOD PRESSURE: 74 MMHG | SYSTOLIC BLOOD PRESSURE: 116 MMHG | RESPIRATION RATE: 16 BRPM | BODY MASS INDEX: 26.22 KG/M2 | HEART RATE: 97 BPM | OXYGEN SATURATION: 98 % | WEIGHT: 148 LBS

## 2019-09-20 DIAGNOSIS — E87.6 HYPOKALEMIA: Primary | ICD-10-CM

## 2019-09-20 DIAGNOSIS — I10 ESSENTIAL HYPERTENSION: ICD-10-CM

## 2019-09-20 DIAGNOSIS — R56.9 SEIZURE (HCC): ICD-10-CM

## 2019-09-20 PROCEDURE — G8419 CALC BMI OUT NRM PARAM NOF/U: HCPCS | Performed by: INTERNAL MEDICINE

## 2019-09-20 PROCEDURE — 1036F TOBACCO NON-USER: CPT | Performed by: INTERNAL MEDICINE

## 2019-09-20 PROCEDURE — 99212 OFFICE O/P EST SF 10 MIN: CPT | Performed by: INTERNAL MEDICINE

## 2019-09-20 PROCEDURE — G8427 DOCREV CUR MEDS BY ELIG CLIN: HCPCS | Performed by: INTERNAL MEDICINE

## 2019-09-20 PROCEDURE — 99213 OFFICE O/P EST LOW 20 MIN: CPT | Performed by: INTERNAL MEDICINE

## 2019-09-20 RX ORDER — AMLODIPINE BESYLATE 5 MG/1
5 TABLET ORAL DAILY
Qty: 30 TABLET | Refills: 3 | Status: SHIPPED | OUTPATIENT
Start: 2019-09-20 | End: 2020-01-29

## 2019-09-20 ASSESSMENT — PATIENT HEALTH QUESTIONNAIRE - PHQ9
SUM OF ALL RESPONSES TO PHQ QUESTIONS 1-9: 0
2. FEELING DOWN, DEPRESSED OR HOPELESS: 0
1. LITTLE INTEREST OR PLEASURE IN DOING THINGS: 0
SUM OF ALL RESPONSES TO PHQ QUESTIONS 1-9: 0
SUM OF ALL RESPONSES TO PHQ9 QUESTIONS 1 & 2: 0

## 2019-09-20 ASSESSMENT — ENCOUNTER SYMPTOMS
VOMITING: 0
COUGH: 0
ABDOMINAL PAIN: 0
DIARRHEA: 0
SORE THROAT: 0
CONSTIPATION: 0
SHORTNESS OF BREATH: 0
BLOOD IN STOOL: 0
EYE DISCHARGE: 0
NAUSEA: 0

## 2019-09-20 NOTE — PATIENT INSTRUCTIONS
Dear Edin Mention,        Thank you for coming to your appointment today. I hope we have addressed all of your needs. Please make sure to do the following:  - Get labs drawn before our next follow up. - Monitor Blood pressure and bring log on next visit    Have a great day! Sincerely,  John Raphael M.D  9/20/2019  9:45 AM
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
## 2019-10-29 ENCOUNTER — HOSPITAL ENCOUNTER (OUTPATIENT)
Age: 34
Discharge: HOME | End: 2019-10-29
Payer: MEDICARE

## 2019-10-29 PROCEDURE — V5266 BATTERY FOR HEARING DEVICE: HCPCS | Performed by: AUDIOLOGIST

## 2019-11-25 ENCOUNTER — APPOINTMENT (OUTPATIENT)
Dept: ULTRASOUND IMAGING | Age: 34
End: 2019-11-25
Payer: MEDICARE

## 2019-11-25 ENCOUNTER — OFFICE VISIT (OUTPATIENT)
Dept: INTERNAL MEDICINE | Age: 34
End: 2019-11-25
Payer: MEDICARE

## 2019-11-25 ENCOUNTER — HOSPITAL ENCOUNTER (EMERGENCY)
Age: 34
Discharge: HOME | End: 2019-11-26
Attending: EMERGENCY MEDICINE
Payer: MEDICARE

## 2019-11-25 VITALS
WEIGHT: 156.6 LBS | BODY MASS INDEX: 28.82 KG/M2 | TEMPERATURE: 98.8 F | SYSTOLIC BLOOD PRESSURE: 112 MMHG | DIASTOLIC BLOOD PRESSURE: 72 MMHG | HEIGHT: 62 IN | HEART RATE: 78 BPM | RESPIRATION RATE: 20 BRPM

## 2019-11-25 VITALS
BODY MASS INDEX: 28.71 KG/M2 | RESPIRATION RATE: 18 BRPM | HEIGHT: 62 IN | TEMPERATURE: 97.4 F | SYSTOLIC BLOOD PRESSURE: 134 MMHG | HEART RATE: 69 BPM | OXYGEN SATURATION: 99 % | DIASTOLIC BLOOD PRESSURE: 90 MMHG | WEIGHT: 156 LBS

## 2019-11-25 DIAGNOSIS — R10.31 GROIN PAIN, RIGHT: Primary | ICD-10-CM

## 2019-11-25 DIAGNOSIS — N50.89 TESTICULAR SWELLING: Primary | ICD-10-CM

## 2019-11-25 DIAGNOSIS — I10 ESSENTIAL HYPERTENSION: ICD-10-CM

## 2019-11-25 DIAGNOSIS — N50.811 TESTICULAR PAIN, RIGHT: ICD-10-CM

## 2019-11-25 LAB
ALBUMIN SERPL-MCNC: 5 G/DL (ref 3.5–5.2)
ALP BLD-CCNC: 90 U/L (ref 40–129)
ALT SERPL-CCNC: 20 U/L (ref 0–40)
ANION GAP SERPL CALCULATED.3IONS-SCNC: 14 MMOL/L (ref 7–16)
AST SERPL-CCNC: 19 U/L (ref 0–39)
BASOPHILS ABSOLUTE: 0.03 E9/L (ref 0–0.2)
BASOPHILS RELATIVE PERCENT: 0.4 % (ref 0–2)
BILIRUB SERPL-MCNC: 0.5 MG/DL (ref 0–1.2)
BILIRUBIN URINE: NEGATIVE
BLOOD, URINE: NEGATIVE
BUN BLDV-MCNC: 12 MG/DL (ref 6–20)
CALCIUM SERPL-MCNC: 10.2 MG/DL (ref 8.6–10.2)
CHLORIDE BLD-SCNC: 105 MMOL/L (ref 98–107)
CLARITY: CLEAR
CO2: 23 MMOL/L (ref 22–29)
COLOR: YELLOW
CREAT SERPL-MCNC: 0.9 MG/DL (ref 0.7–1.2)
EOSINOPHILS ABSOLUTE: 0.12 E9/L (ref 0.05–0.5)
EOSINOPHILS RELATIVE PERCENT: 1.4 % (ref 0–6)
GFR AFRICAN AMERICAN: >60
GFR NON-AFRICAN AMERICAN: >60 ML/MIN/1.73
GLUCOSE BLD-MCNC: 91 MG/DL (ref 74–99)
GLUCOSE URINE: NEGATIVE MG/DL
HCT VFR BLD CALC: 48.2 % (ref 37–54)
HEMOGLOBIN: 16.4 G/DL (ref 12.5–16.5)
IMMATURE GRANULOCYTES #: 0.02 E9/L
IMMATURE GRANULOCYTES %: 0.2 % (ref 0–5)
KETONES, URINE: NEGATIVE MG/DL
LACTIC ACID: 0.9 MMOL/L (ref 0.5–2.2)
LEUKOCYTE ESTERASE, URINE: NEGATIVE
LYMPHOCYTES ABSOLUTE: 3.48 E9/L (ref 1.5–4)
LYMPHOCYTES RELATIVE PERCENT: 40.8 % (ref 20–42)
MCH RBC QN AUTO: 30 PG (ref 26–35)
MCHC RBC AUTO-ENTMCNC: 34 % (ref 32–34.5)
MCV RBC AUTO: 88.1 FL (ref 80–99.9)
MONOCYTES ABSOLUTE: 0.41 E9/L (ref 0.1–0.95)
MONOCYTES RELATIVE PERCENT: 4.8 % (ref 2–12)
NEUTROPHILS ABSOLUTE: 4.47 E9/L (ref 1.8–7.3)
NEUTROPHILS RELATIVE PERCENT: 52.4 % (ref 43–80)
NITRITE, URINE: NEGATIVE
PDW BLD-RTO: 11.3 FL (ref 11.5–15)
PH UA: 6 (ref 5–9)
PLATELET # BLD: 214 E9/L (ref 130–450)
PMV BLD AUTO: 9.8 FL (ref 7–12)
POTASSIUM SERPL-SCNC: 3.8 MMOL/L (ref 3.5–5)
PROTEIN UA: NEGATIVE MG/DL
RBC # BLD: 5.47 E12/L (ref 3.8–5.8)
SODIUM BLD-SCNC: 142 MMOL/L (ref 132–146)
SPECIFIC GRAVITY UA: 1.02 (ref 1–1.03)
TOTAL PROTEIN: 8.9 G/DL (ref 6.4–8.3)
UROBILINOGEN, URINE: 0.2 E.U./DL
WBC # BLD: 8.5 E9/L (ref 4.5–11.5)

## 2019-11-25 PROCEDURE — 87591 N.GONORRHOEAE DNA AMP PROB: CPT

## 2019-11-25 PROCEDURE — 76870 US EXAM SCROTUM: CPT

## 2019-11-25 PROCEDURE — 99213 OFFICE O/P EST LOW 20 MIN: CPT | Performed by: INTERNAL MEDICINE

## 2019-11-25 PROCEDURE — 36415 COLL VENOUS BLD VENIPUNCTURE: CPT

## 2019-11-25 PROCEDURE — 80053 COMPREHEN METABOLIC PANEL: CPT

## 2019-11-25 PROCEDURE — G8427 DOCREV CUR MEDS BY ELIG CLIN: HCPCS | Performed by: INTERNAL MEDICINE

## 2019-11-25 PROCEDURE — 87491 CHLMYD TRACH DNA AMP PROBE: CPT

## 2019-11-25 PROCEDURE — 99284 EMERGENCY DEPT VISIT MOD MDM: CPT

## 2019-11-25 PROCEDURE — 85025 COMPLETE CBC W/AUTO DIFF WBC: CPT

## 2019-11-25 PROCEDURE — 81003 URINALYSIS AUTO W/O SCOPE: CPT

## 2019-11-25 PROCEDURE — 93975 VASCULAR STUDY: CPT

## 2019-11-25 PROCEDURE — 83605 ASSAY OF LACTIC ACID: CPT

## 2019-11-25 PROCEDURE — 87088 URINE BACTERIA CULTURE: CPT

## 2019-11-25 PROCEDURE — G8482 FLU IMMUNIZE ORDER/ADMIN: HCPCS | Performed by: INTERNAL MEDICINE

## 2019-11-25 PROCEDURE — 1036F TOBACCO NON-USER: CPT | Performed by: INTERNAL MEDICINE

## 2019-11-25 PROCEDURE — G8419 CALC BMI OUT NRM PARAM NOF/U: HCPCS | Performed by: INTERNAL MEDICINE

## 2019-11-25 ASSESSMENT — ENCOUNTER SYMPTOMS
COUGH: 0
VOMITING: 0
NAUSEA: 0
SHORTNESS OF BREATH: 0
ABDOMINAL PAIN: 0
DIARRHEA: 0
SORE THROAT: 0

## 2019-11-25 ASSESSMENT — PAIN DESCRIPTION - ORIENTATION: ORIENTATION: LEFT

## 2019-11-25 ASSESSMENT — PAIN DESCRIPTION - PROGRESSION: CLINICAL_PROGRESSION: GRADUALLY WORSENING

## 2019-11-25 ASSESSMENT — PAIN DESCRIPTION - FREQUENCY: FREQUENCY: CONTINUOUS

## 2019-11-25 ASSESSMENT — PAIN DESCRIPTION - PAIN TYPE: TYPE: ACUTE PAIN

## 2019-11-25 ASSESSMENT — PAIN DESCRIPTION - DESCRIPTORS: DESCRIPTORS: ACHING

## 2019-11-25 ASSESSMENT — PAIN SCALES - WONG BAKER: WONGBAKER_NUMERICALRESPONSE: 8

## 2019-11-25 ASSESSMENT — PAIN DESCRIPTION - LOCATION: LOCATION: GROIN

## 2019-11-26 ENCOUNTER — APPOINTMENT (OUTPATIENT)
Dept: CT IMAGING | Age: 34
End: 2019-11-26
Payer: MEDICARE

## 2019-11-26 PROCEDURE — 2580000003 HC RX 258: Performed by: RADIOLOGY

## 2019-11-26 PROCEDURE — 6360000004 HC RX CONTRAST MEDICATION: Performed by: RADIOLOGY

## 2019-11-26 PROCEDURE — 74177 CT ABD & PELVIS W/CONTRAST: CPT

## 2019-11-26 RX ORDER — NAPROXEN 500 MG/1
500 TABLET ORAL 2 TIMES DAILY
Qty: 14 TABLET | Refills: 0 | Status: SHIPPED | OUTPATIENT
Start: 2019-11-26 | End: 2020-03-11

## 2019-11-26 RX ORDER — SODIUM CHLORIDE 0.9 % (FLUSH) 0.9 %
10 SYRINGE (ML) INJECTION PRN
Status: COMPLETED | OUTPATIENT
Start: 2019-11-26 | End: 2019-11-26

## 2019-11-26 RX ADMIN — IOPAMIDOL 110 ML: 755 INJECTION, SOLUTION INTRAVENOUS at 01:18

## 2019-11-26 RX ADMIN — Medication 10 ML: at 01:18

## 2019-11-27 ENCOUNTER — OFFICE VISIT (OUTPATIENT)
Dept: INTERNAL MEDICINE | Age: 34
End: 2019-11-27
Payer: MEDICARE

## 2019-11-27 VITALS
TEMPERATURE: 98.6 F | RESPIRATION RATE: 16 BRPM | WEIGHT: 152 LBS | HEART RATE: 70 BPM | DIASTOLIC BLOOD PRESSURE: 100 MMHG | SYSTOLIC BLOOD PRESSURE: 140 MMHG | HEIGHT: 67 IN | BODY MASS INDEX: 23.86 KG/M2

## 2019-11-27 DIAGNOSIS — N43.3 HYDROCELE IN ADULT: ICD-10-CM

## 2019-11-27 DIAGNOSIS — R10.31 RIGHT GROIN PAIN: Primary | ICD-10-CM

## 2019-11-27 PROCEDURE — 99213 OFFICE O/P EST LOW 20 MIN: CPT | Performed by: INTERNAL MEDICINE

## 2019-11-27 PROCEDURE — 1036F TOBACCO NON-USER: CPT | Performed by: INTERNAL MEDICINE

## 2019-11-27 PROCEDURE — G8482 FLU IMMUNIZE ORDER/ADMIN: HCPCS | Performed by: INTERNAL MEDICINE

## 2019-11-27 PROCEDURE — G8420 CALC BMI NORM PARAMETERS: HCPCS | Performed by: INTERNAL MEDICINE

## 2019-11-27 PROCEDURE — G8427 DOCREV CUR MEDS BY ELIG CLIN: HCPCS | Performed by: INTERNAL MEDICINE

## 2019-11-27 RX ORDER — LEVOFLOXACIN 500 MG/1
500 TABLET, FILM COATED ORAL DAILY
Qty: 10 TABLET | Refills: 0 | Status: SHIPPED | OUTPATIENT
Start: 2019-11-27 | End: 2019-12-07

## 2019-11-27 ASSESSMENT — ENCOUNTER SYMPTOMS
SORE THROAT: 0
DIARRHEA: 0
VOMITING: 0
NAUSEA: 0
COUGH: 0
SHORTNESS OF BREATH: 0
ABDOMINAL PAIN: 0

## 2019-11-28 LAB — URINE CULTURE, ROUTINE: NORMAL

## 2019-11-29 LAB
C. TRACHOMATIS DNA ,URINE: NEGATIVE
N. GONORRHOEAE DNA, URINE: NEGATIVE
SOURCE: NORMAL

## 2020-01-29 RX ORDER — AMLODIPINE BESYLATE 5 MG/1
TABLET ORAL
Qty: 30 TABLET | Refills: 1 | Status: SHIPPED
Start: 2020-01-29 | End: 2020-03-11 | Stop reason: SDUPTHER

## 2020-03-10 RX ORDER — AMLODIPINE BESYLATE 5 MG/1
TABLET ORAL
Qty: 30 TABLET | Refills: 1 | OUTPATIENT
Start: 2020-03-10

## 2020-03-11 ENCOUNTER — OFFICE VISIT (OUTPATIENT)
Dept: INTERNAL MEDICINE | Age: 35
End: 2020-03-11
Payer: MEDICARE

## 2020-03-11 VITALS
HEART RATE: 83 BPM | RESPIRATION RATE: 18 BRPM | WEIGHT: 153.5 LBS | HEIGHT: 63 IN | OXYGEN SATURATION: 98 % | SYSTOLIC BLOOD PRESSURE: 122 MMHG | BODY MASS INDEX: 27.2 KG/M2 | TEMPERATURE: 98.3 F | DIASTOLIC BLOOD PRESSURE: 92 MMHG

## 2020-03-11 PROBLEM — R45.4 DIFFICULTY CONTROLLING ANGER: Status: RESOLVED | Noted: 2017-02-17 | Resolved: 2020-03-11

## 2020-03-11 PROBLEM — H91.93 BILATERAL HEARING LOSS: Status: ACTIVE | Noted: 2020-03-11

## 2020-03-11 PROBLEM — H61.23 HEARING LOSS DUE TO CERUMEN IMPACTION, BILATERAL: Chronic | Status: RESOLVED | Noted: 2017-12-22 | Resolved: 2020-03-11

## 2020-03-11 PROBLEM — I10 ESSENTIAL HYPERTENSION: Chronic | Status: ACTIVE | Noted: 2017-12-22

## 2020-03-11 PROBLEM — N50.811 TESTICULAR PAIN, RIGHT: Status: RESOLVED | Noted: 2019-11-25 | Resolved: 2020-03-11

## 2020-03-11 PROBLEM — H61.23 HEARING LOSS DUE TO CERUMEN IMPACTION, BILATERAL: Chronic | Status: ACTIVE | Noted: 2017-12-22

## 2020-03-11 PROCEDURE — G8482 FLU IMMUNIZE ORDER/ADMIN: HCPCS | Performed by: INTERNAL MEDICINE

## 2020-03-11 PROCEDURE — G0439 PPPS, SUBSEQ VISIT: HCPCS | Performed by: INTERNAL MEDICINE

## 2020-03-11 PROCEDURE — 6360000002 HC RX W HCPCS

## 2020-03-11 PROCEDURE — G0010 ADMIN HEPATITIS B VACCINE: HCPCS

## 2020-03-11 PROCEDURE — 99212 OFFICE O/P EST SF 10 MIN: CPT | Performed by: INTERNAL MEDICINE

## 2020-03-11 PROCEDURE — 90740 HEPB VACC 3 DOSE IMMUNSUP IM: CPT

## 2020-03-11 RX ORDER — AMLODIPINE BESYLATE 5 MG/1
TABLET ORAL
Qty: 30 TABLET | Refills: 2 | Status: SHIPPED
Start: 2020-03-11 | End: 2020-04-15 | Stop reason: SDUPTHER

## 2020-03-11 SDOH — ECONOMIC STABILITY: INCOME INSECURITY: HOW HARD IS IT FOR YOU TO PAY FOR THE VERY BASICS LIKE FOOD, HOUSING, MEDICAL CARE, AND HEATING?: NOT HARD AT ALL

## 2020-03-11 SDOH — ECONOMIC STABILITY: FOOD INSECURITY: WITHIN THE PAST 12 MONTHS, THE FOOD YOU BOUGHT JUST DIDN'T LAST AND YOU DIDN'T HAVE MONEY TO GET MORE.: NEVER TRUE

## 2020-03-11 SDOH — ECONOMIC STABILITY: FOOD INSECURITY: WITHIN THE PAST 12 MONTHS, YOU WORRIED THAT YOUR FOOD WOULD RUN OUT BEFORE YOU GOT MONEY TO BUY MORE.: NEVER TRUE

## 2020-03-11 ASSESSMENT — LIFESTYLE VARIABLES: HOW OFTEN DO YOU HAVE A DRINK CONTAINING ALCOHOL: 0

## 2020-03-11 ASSESSMENT — PATIENT HEALTH QUESTIONNAIRE - PHQ9
SUM OF ALL RESPONSES TO PHQ QUESTIONS 1-9: 0
SUM OF ALL RESPONSES TO PHQ QUESTIONS 1-9: 0

## 2020-03-11 NOTE — PROGRESS NOTES
I discussed the patient with Dr. Keely Rossi. Patient here for Wellness visit. Patient doing well. Patient only received one dose of Hepatitis B vaccine. He is due for HIV screen. Past medical, surgical, family history reviewed. Medications and allergies reviewed. Exam as per resident exam.    Stewart Sprinkles:    03/11/20 1317 03/11/20 1322   BP: (!) 120/92 (!) 122/92   Pulse: 83    Resp: 18    Temp: 98.3 °F (36.8 °C)    TempSrc: Oral    SpO2: 98%    Weight: 153 lb 8 oz (69.6 kg)    Height: 5' 3\" (1.6 m)      I reviewed patient labs. Assessment/Plan:  1. Annual Well exam    Reviewed cancer screenings. Will check HIV testing. Will get Hepatitis series will have to start over. Discussed assessment and plan with resident. Agree with their note. See their note for further details.      Jorge Wagner, DO

## 2020-03-11 NOTE — PROGRESS NOTES
Patient verbalized understanding of office instructions. He will call with questions or concerns. Pt was given discharge instructions, and  Hepatitis B vaccine per ordered without distress noted  Printed VIS given to pt . All other questions were fully answered. Instructed to stop at  for printed AVS.

## 2020-03-11 NOTE — PATIENT INSTRUCTIONS
Thank you for coming to your appointment today. Please make sure to do the following:  - Continue the medications as listed  - Get labs drawn (HIV screening) before next visit  - Schedule your appointments for second hepatitis B vaccine   (3 total doses: administered at 0, 1, and 6 months)    Follow up as scheduled. Please call with any questions or concerns. [phone]      Personalized Preventive Plan for [place] - 3/11/2020  Medicare offers a range of preventive health benefits. Some of the tests and screenings are paid in full while other may be subject to a deductible, co-insurance, and/or copay. Some of these benefits include a comprehensive review of your medical history including lifestyle, illnesses that may run in your family, and various assessments and screenings as appropriate. After reviewing your medical record and screening and assessments performed today your provider may have ordered immunizations, labs, imaging, and/or referrals for you. A list of these orders (if applicable) as well as your Preventive Care list are included within your After Visit Summary for your review. Other Preventive Recommendations:    · A preventive eye exam performed by an eye specialist is recommended every 1-2 years to screen for glaucoma; cataracts, macular degeneration, and other eye disorders. · A preventive dental visit is recommended every 6 months. · Try to get at least 150 minutes of exercise per week or 10,000 steps per day on a pedometer . · Order or download the FREE \"Exercise & Physical Activity: Your Everyday Guide\" from [employer]. Call 8-816.266.7853 or search [employer] online. · You need 6807-6613 mg of calcium and 0296-8679 IU of vitamin D per day.  It is possible to meet your calcium requirement with diet alone, but a vitamin D supplement is usually necessary to meet this goal.  · When exposed to the sun, use a sunscreen that protects against both UVA and UVB radiation with an SPF of 30 or greater. Reapply every 2 to 3 hours or after sweating, drying off with a towel, or swimming. · Always wear a seat belt when traveling in a car. Always wear a helmet when riding a bicycle or motorcycle.

## 2020-03-11 NOTE — PROGRESS NOTES
Medicare Annual Wellness Visit  Name: Rosa Humphreys Date: 3/11/2020   MRN: [id number] Sex: Male   Age: 38 y.o. Ethnicity: Non-/Non    : 1985 Race: Joey Westfall is here for Medicare AWV    Screenings for behavioral, psychosocial and functional/safety risks, and cognitive dysfunction are all negative except as indicated below. These results, as well as other patient data from the [address] form, are documented in Flowsheets linked to this Encounter. No Known Allergies    Prior to Visit Medications    Medication Sig Taking? Authorizing Provider   amLODIPine (NORVASC) 5 MG tablet take 1 tablet by mouth once daily Yes Nghia Carlos MD   naproxen (NAPROSYN) 500 MG tablet Take 1 tablet by mouth 2 times daily for 7 days  Lennox Mash, MD       Past Medical History:   Diagnosis Date    Cerumen impaction     Hypertension     Seizure ([address] 2018    Questionable seizure, occurred 2018 with syncopal episode. No AED and no recurrence since. Past Surgical History:   Procedure Laterality Date    OTHER SURGICAL HISTORY Bilateral 2018    ear wax abstraction       No family history on file. CareTeam (Including outside providers/suppliers regularly involved in providing care):   Patient Care Team:  James John MD as PCP - General (Internal Medicine)    Wt Readings from Last 3 Encounters:   19 152 lb (68.9 kg)   19 156 lb (70.8 kg)   19 156 lb 9.6 oz (71 kg)     There were no vitals filed for this visit. There is no height or weight on file to calculate BMI.       General Appearance: alert and oriented to person, place and time, well developed and well- nourished, in no acute distress  Skin: warm and dry, no rash or erythema  Head: normocephalic and atraumatic  Eyes: pupils equal, round, and reactive to light, extraocular eye movements intact, conjunctivae normal  ENT: tympanic membrane, external ear and ear canal normal vaccine  Aged Out    Hib vaccine  Aged Out    Meningococcal (ACWY) vaccine  Aged Out    Pneumococcal 0-64 years Vaccine  Aged Out     Recommendations for [employer] Due: see orders and patient instructions/AVS.  . Recommended screening schedule for the next 5-10 years is provided to the patient in written form: see Patient Instructions/AVS.    Possible mild intellectual impairment rather than cognitive impairment   - Mini cog: three word recall was normal but clock numbers placed in incorrect position     HTN-controlled  - Cont amlodipine 5 mg     Health maintenance  - Had 1st HBV vaccine in 2000  - Will start HBV vaccine   - Restart HBV vaccine (3 total doses: administered at 0, 1, and 6 months)  - Ordered HIV screening.  Not done yet    Electronically signed by Ale Velasquez MD on 3/11/2020 at 1:24 PM  Attending physician: Dr. Shreya Westbrook

## 2020-04-09 ENCOUNTER — NURSE ONLY (OUTPATIENT)
Dept: INTERNAL MEDICINE | Age: 35
End: 2020-04-09
Payer: MEDICARE

## 2020-04-09 VITALS — TEMPERATURE: 98.6 F

## 2020-04-09 PROCEDURE — G0010 ADMIN HEPATITIS B VACCINE: HCPCS

## 2020-04-09 PROCEDURE — 6360000002 HC RX W HCPCS

## 2020-04-09 PROCEDURE — 90740 HEPB VACC 3 DOSE IMMUNSUP IM: CPT

## 2020-04-15 ENCOUNTER — OFFICE VISIT (OUTPATIENT)
Dept: INTERNAL MEDICINE | Age: 35
End: 2020-04-15
Payer: MEDICARE

## 2020-04-15 VITALS
TEMPERATURE: 98.3 F | SYSTOLIC BLOOD PRESSURE: 117 MMHG | HEIGHT: 62 IN | RESPIRATION RATE: 16 BRPM | BODY MASS INDEX: 28.63 KG/M2 | WEIGHT: 155.6 LBS | HEART RATE: 80 BPM | DIASTOLIC BLOOD PRESSURE: 87 MMHG

## 2020-04-15 PROCEDURE — 99212 OFFICE O/P EST SF 10 MIN: CPT | Performed by: INTERNAL MEDICINE

## 2020-04-15 PROCEDURE — 1036F TOBACCO NON-USER: CPT | Performed by: INTERNAL MEDICINE

## 2020-04-15 PROCEDURE — 90740 HEPB VACC 3 DOSE IMMUNSUP IM: CPT

## 2020-04-15 PROCEDURE — G8419 CALC BMI OUT NRM PARAM NOF/U: HCPCS | Performed by: INTERNAL MEDICINE

## 2020-04-15 PROCEDURE — G8427 DOCREV CUR MEDS BY ELIG CLIN: HCPCS | Performed by: INTERNAL MEDICINE

## 2020-04-15 PROCEDURE — 6360000002 HC RX W HCPCS

## 2020-04-15 PROCEDURE — G0010 ADMIN HEPATITIS B VACCINE: HCPCS

## 2020-04-15 RX ORDER — AMLODIPINE BESYLATE 5 MG/1
TABLET ORAL
Qty: 30 TABLET | Refills: 4 | Status: SHIPPED
Start: 2020-04-15 | End: 2021-01-05 | Stop reason: SDUPTHER

## 2020-04-15 ASSESSMENT — ENCOUNTER SYMPTOMS
ABDOMINAL PAIN: 0
NAUSEA: 0
SORE THROAT: 0
SHORTNESS OF BREATH: 0
VOMITING: 0
DIARRHEA: 0
COUGH: 0

## 2020-04-15 NOTE — PROGRESS NOTES
maintenance  - 1st HBV vaccine 3/11/2020  - For 2nd HBV vaccine today  - Need 3rd HBV in September (3 total doses: administered at 0, 1, and 6 months)  - Ordered HIV screening  - BMP, lipid panel, HbA1c prior to next visit    RTC:  in 5 months for 3rd HBV vaccine and med refills. Call in if having any questions.     I have reviewed my findings and recommendations with Raghavendra Vivas and Dr Megan Clark MD PGY-3  4/15/2020 10:40 AM

## 2020-06-15 ENCOUNTER — HOSPITAL ENCOUNTER (OUTPATIENT)
Dept: AUDIOLOGY | Age: 35
Discharge: HOME | End: 2020-06-15
Payer: MEDICARE

## 2020-06-15 PROCEDURE — V5014 HEARING AID REPAIR/MODIFYING: HCPCS

## 2020-09-11 ENCOUNTER — NURSE ONLY (OUTPATIENT)
Dept: INTERNAL MEDICINE | Age: 35
End: 2020-09-11
Payer: MEDICARE

## 2020-09-11 PROCEDURE — 6360000002 HC RX W HCPCS

## 2020-09-11 PROCEDURE — G0010 ADMIN HEPATITIS B VACCINE: HCPCS

## 2020-09-11 PROCEDURE — 90740 HEPB VACC 3 DOSE IMMUNSUP IM: CPT

## 2021-01-05 ENCOUNTER — OFFICE VISIT (OUTPATIENT)
Dept: INTERNAL MEDICINE | Age: 36
End: 2021-01-05
Payer: MEDICARE

## 2021-01-05 VITALS
TEMPERATURE: 98.1 F | HEIGHT: 67 IN | BODY MASS INDEX: 26.37 KG/M2 | HEART RATE: 102 BPM | SYSTOLIC BLOOD PRESSURE: 116 MMHG | WEIGHT: 168 LBS | RESPIRATION RATE: 16 BRPM | DIASTOLIC BLOOD PRESSURE: 74 MMHG | OXYGEN SATURATION: 97 %

## 2021-01-05 DIAGNOSIS — I10 ESSENTIAL HYPERTENSION: ICD-10-CM

## 2021-01-05 PROCEDURE — 99212 OFFICE O/P EST SF 10 MIN: CPT | Performed by: STUDENT IN AN ORGANIZED HEALTH CARE EDUCATION/TRAINING PROGRAM

## 2021-01-05 PROCEDURE — 99213 OFFICE O/P EST LOW 20 MIN: CPT | Performed by: STUDENT IN AN ORGANIZED HEALTH CARE EDUCATION/TRAINING PROGRAM

## 2021-01-05 PROCEDURE — 1036F TOBACCO NON-USER: CPT | Performed by: STUDENT IN AN ORGANIZED HEALTH CARE EDUCATION/TRAINING PROGRAM

## 2021-01-05 PROCEDURE — G8427 DOCREV CUR MEDS BY ELIG CLIN: HCPCS | Performed by: STUDENT IN AN ORGANIZED HEALTH CARE EDUCATION/TRAINING PROGRAM

## 2021-01-05 PROCEDURE — G8419 CALC BMI OUT NRM PARAM NOF/U: HCPCS | Performed by: STUDENT IN AN ORGANIZED HEALTH CARE EDUCATION/TRAINING PROGRAM

## 2021-01-05 PROCEDURE — G8484 FLU IMMUNIZE NO ADMIN: HCPCS | Performed by: STUDENT IN AN ORGANIZED HEALTH CARE EDUCATION/TRAINING PROGRAM

## 2021-01-05 RX ORDER — AMLODIPINE BESYLATE 5 MG/1
TABLET ORAL
Qty: 90 TABLET | Refills: 1 | Status: SHIPPED
Start: 2021-01-05 | End: 2021-05-06 | Stop reason: SDUPTHER

## 2021-01-05 ASSESSMENT — ENCOUNTER SYMPTOMS
COUGH: 0
GASTROINTESTINAL NEGATIVE: 1
RHINORRHEA: 0
SINUS PAIN: 0
SHORTNESS OF BREATH: 0
EYES NEGATIVE: 1
SINUS PRESSURE: 0

## 2021-01-05 NOTE — PATIENT INSTRUCTIONS
Dear Abiodun Ortega,        Thank you for coming to your appointment today. I hope we have addressed all of your needs. Please make sure to do the following:  - Continue your medications as listed. - Get labs drawn before our next follow up. It is very important that you get this lab work done.  -Please follow up with PCP in 4 months     Call for a sooner appointment if you develop new symptoms. Have a great day!         Sincerely,  Tereza Romero D.O.  1/5/2021  2:29 PM

## 2021-01-05 NOTE — PROGRESS NOTES
[facility]  InternalMedicine Residency Program  ACC Note      SUBJECTIVE:  CC: had concerns including Medication Refill. Negra Santos presented to the [facility] for a routine visit. Mr. Maria T Guerrero is a 75-year-old male with past medical history of hypertension and hypertriglyceridemia who presented for medication refill. Patient reports no complaints says he is doing very well. Patient says that he is running low on his  amlodipine and would like a refill. Blood pressure today 116/74. Patient does not want to take blood pressure at home. Patient reports getting third hepatitis B vaccine at his local pharmacy. Patient declines hep C screen. HIV screen pending. Patient did not obtain lab work from previous visit on 4/15/2020. Patient denies alcohol or tobacco use. Follow-up with PCP planned in 4 months patient says he will have lab work done prior to next visit. Review of Systems   Constitutional: Negative for activity change, appetite change, fatigue and fever. HENT: Negative for congestion, rhinorrhea, sinus pressure and sinus pain. Eyes: Negative. Respiratory: Negative for cough and shortness of breath. Cardiovascular: Negative for chest pain, palpitations and leg swelling. Gastrointestinal: Negative. Endocrine: Negative for polydipsia, polyphagia and polyuria. Neurological: Negative for dizziness, weakness, light-headedness, numbness and headaches. Psychiatric/Behavioral: Negative. Outpatient Medications Marked as Taking for the 1/5/21 encounter (Office Visit) with Veena Camargo, DO   Medication Sig Dispense Refill    amLODIPine (NORVASC) 5 MG tablet take 1 tablet by mouth once daily 30 tablet 4       I have reviewed all pertinent PMHx, PSHx, FamHx, SocialHx, medications, and allergiesand updated history as appropriate.     OBJECTIVE: VS: /74 (Site: Left Upper Arm, Position: Sitting, Cuff Size: Medium Adult)   Pulse 102   Temp 98.1 °F (36.7 °C) (Oral)   Resp 16   Ht 5' 7\" (1.702 m)   Wt 168 lb (76.2 kg)   SpO2 97% Comment: at rest on room air  BMI 26.31 kg/m²   Physical Exam  Constitutional:       General: He is not in acute distress. Appearance: Normal appearance. He is normal weight. He is not toxic-appearing. HENT:      Head: Normocephalic and atraumatic. Nose: Nose normal.      Mouth/Throat:      Mouth: Mucous membranes are moist.      Pharynx: Oropharynx is clear. No oropharyngeal exudate. Neck:      Musculoskeletal: Normal range of motion. Cardiovascular:      Rate and Rhythm: Normal rate and regular rhythm. Pulses: Normal pulses. Heart sounds: Normal heart sounds. No murmur. No friction rub. No gallop. Pulmonary:      Effort: Pulmonary effort is normal.      Breath sounds: Normal breath sounds. No wheezing, rhonchi or rales. Abdominal:      General: Abdomen is flat. Bowel sounds are normal.      Palpations: Abdomen is soft. Musculoskeletal: Normal range of motion. General: No swelling or tenderness. Right lower leg: No edema. Left lower leg: No edema. Skin:     General: Skin is warm and dry. Neurological:      General: No focal deficit present. Mental Status: He is alert and oriented to person, place, and time. Motor: No weakness. PLAN:  1. Essential hypertension  -Blood pressure 116/74 today  -Patient declined blood pressure cuff to monitor blood pressure at home due to concerns about not being able to use it properly  -Continue amLODIPine (NORVASC) 5 MG tablet; take 1 tablet by mouth once daily      2.   Screening for diabetes mellitus  -Patient has no known previously reported hemoglobin A1c  -Most recent CMP 11/25/2019 was within normal limits  -Patient did not obtain lab work ordered last visit  -Follow hemoglobin  A1c  -Follow BMP 3.  Hypertriglyceridemia  -Previous lipid screen on 1/22/15 showed total cholesterol 185, triglycerides 245, HDL 68, LDL 68, VLDL 49  -Recommended diet and exercise  -Follow lipid panel    4.   Healthcare maintenance  -Patient reports receiving third and final dose of hepatitis B vaccine per local pharmacy  Patient declines hepatitis C screening    I have reviewed my findings and recommendations with Jania Chisholm and Dr Luis Felton, DO PGY-1   1/5/2021 2:20 PM

## 2021-01-05 NOTE — PROGRESS NOTES
[id number]  Internal Medicine Clinic    Attending Physician Statement:  Ira Kemp M.D., F.A.C.P. I have discussed the case, including pertinent history and exam findings with the resident. I agree with the assessment, plan and orders as documented by the resident. Patient is seen for fu visit today. Last office notes reviewed, relative labs and imaging. Health maintenance issues of vaccinations, depression screening, tobacco cessation etc... covered  Labs overdue- he didn't get  Amlodipine- -today HTN controlled  (should have run out) - ?complance  Remainder of medical problems as per resident note.

## 2021-01-22 ENCOUNTER — HOSPITAL ENCOUNTER (OUTPATIENT)
Dept: AUDIOLOGY | Age: 36
Discharge: HOME | End: 2021-01-22
Payer: MEDICARE

## 2021-01-22 PROCEDURE — V5266 BATTERY FOR HEARING DEVICE: HCPCS | Performed by: AUDIOLOGIST

## 2021-01-22 NOTE — PROGRESS NOTES
Hearing aid check, battery  and billing to insurance. Hearing aid cleaned and checked. All good. He is pleased with the hearing aids. Will call as needed.   Electronically signed by Brice Godinez on 1/22/2021 at 3:33 PM

## 2021-05-06 ENCOUNTER — OFFICE VISIT (OUTPATIENT)
Dept: INTERNAL MEDICINE | Age: 36
End: 2021-05-06
Payer: MEDICARE

## 2021-05-06 VITALS
HEIGHT: 63 IN | WEIGHT: 171 LBS | BODY MASS INDEX: 30.3 KG/M2 | HEART RATE: 85 BPM | SYSTOLIC BLOOD PRESSURE: 124 MMHG | RESPIRATION RATE: 16 BRPM | TEMPERATURE: 98 F | DIASTOLIC BLOOD PRESSURE: 88 MMHG | OXYGEN SATURATION: 98 %

## 2021-05-06 DIAGNOSIS — Z11.59 ENCOUNTER FOR HCV SCREENING TEST FOR LOW RISK PATIENT: ICD-10-CM

## 2021-05-06 DIAGNOSIS — Z23 NEED FOR HEPATITIS B VACCINATION: Primary | ICD-10-CM

## 2021-05-06 DIAGNOSIS — Z00.00 HEALTH CARE MAINTENANCE: ICD-10-CM

## 2021-05-06 DIAGNOSIS — Z11.4 SCREENING FOR HIV WITHOUT PRESENCE OF RISK FACTORS: ICD-10-CM

## 2021-05-06 DIAGNOSIS — I10 ESSENTIAL HYPERTENSION: ICD-10-CM

## 2021-05-06 PROCEDURE — 1036F TOBACCO NON-USER: CPT | Performed by: INTERNAL MEDICINE

## 2021-05-06 PROCEDURE — G8417 CALC BMI ABV UP PARAM F/U: HCPCS | Performed by: INTERNAL MEDICINE

## 2021-05-06 PROCEDURE — G8427 DOCREV CUR MEDS BY ELIG CLIN: HCPCS | Performed by: INTERNAL MEDICINE

## 2021-05-06 PROCEDURE — 99213 OFFICE O/P EST LOW 20 MIN: CPT | Performed by: INTERNAL MEDICINE

## 2021-05-06 PROCEDURE — 99212 OFFICE O/P EST SF 10 MIN: CPT | Performed by: INTERNAL MEDICINE

## 2021-05-06 RX ORDER — AMLODIPINE BESYLATE 5 MG/1
TABLET ORAL
Qty: 90 TABLET | Refills: 1 | Status: SHIPPED | OUTPATIENT
Start: 2021-05-06 | End: 2022-01-11 | Stop reason: SDUPTHER

## 2021-05-06 SDOH — ECONOMIC STABILITY: TRANSPORTATION INSECURITY
IN THE PAST 12 MONTHS, HAS LACK OF TRANSPORTATION KEPT YOU FROM MEETINGS, WORK, OR FROM GETTING THINGS NEEDED FOR DAILY LIVING?: NO

## 2021-05-06 ASSESSMENT — PATIENT HEALTH QUESTIONNAIRE - PHQ9
1. LITTLE INTEREST OR PLEASURE IN DOING THINGS: 0
2. FEELING DOWN, DEPRESSED OR HOPELESS: 0
SUM OF ALL RESPONSES TO PHQ QUESTIONS 1-9: 0

## 2021-05-06 NOTE — PATIENT INSTRUCTIONS
Please continue to take amlodipine 5 mg daily and keep a blood pressure log  Please complete your COVID-19 vaccine as soon as possible  Please complete your labs after fasting about 12 hours  Please schedule varicella vaccine after 2 weeks of the second dose of Covid vaccine

## 2021-05-06 NOTE — PROGRESS NOTES
Discharge instructions reviewed with patient per Dr. Brianna Martinez. . Patient directed to  to  AVS and schedule follow up appt.
[facility]  Internal Medicine Residency Clinic    Attending Physician Statement  I have discussed the case, including pertinent history and exam findings with the resident physician. I agree with the assessment, plan and orders as documented by the resident. I have reviewed all pertinent PMHx, PSHx, FamHx, SocialHx, medications, and allergies and updated history as appropriate. Patient presents for routine follow up of medical problems. HTN -- controlled on amlodipine     Screening: discussed fasting lipids, A1c (BMI 30), HCV ab; counseled regarding Varicella vaccination and COVID and he agrees to proceed. Recommend 2 weeks apart from each other. Remainder of medical problems as per resident note.     Guy Reddy DO  5/6/2021 8:36 AM
rhythm. No rub, murmur or gallop  Abdomen: Abdomen soft but obese, non-tender. non-distended BS normal. No masses, organomegaly, no guarding rebound or rigidity. Extremities: No edema, Peripheral pulses palpable 2/4    ASSESSMENT/PLAN:  Essential hypertension    - amLODIPine (NORVASC) 5 MG tablet; take 1 tablet by mouth once daily  Dispense: 90 tablet; Refill: 1    Screening for HIV without presence of risk factors    - HIV Screen; Future    Health care maintenance    - BASIC METABOLIC PANEL; Future  - LIPID PANEL; Future  - HEMOGLOBIN A1C; Future    Encounter for HCV screening test for low risk patient    - HEPATITIS C ANTIBODY;  Future    Varicella vaccine paper prescription given, information about making appointment to get COVID-19 vaccine given    RTO in 6 months for AWV    I have reviewed my findings and recommendations with Mariusz Chiu and Dr Jarad Eubanks MD PGY-3  5/6/2021 8:16 AM

## 2021-09-14 ENCOUNTER — HOSPITAL ENCOUNTER (OUTPATIENT)
Dept: AUDIOLOGY | Age: 36
Discharge: HOME | End: 2021-09-14
Payer: MEDICARE

## 2021-09-14 PROCEDURE — V5266 BATTERY FOR HEARING DEVICE: HCPCS | Performed by: AUDIOLOGIST

## 2021-09-14 PROCEDURE — 9990000010 HC NO CHARGE VISIT: Performed by: AUDIOLOGIST

## 2021-09-14 NOTE — PROGRESS NOTES
Hearing aid battery  and billing to insurance. Hearing aid check and cleaning. Ordering new ear molds and new speakers. Will need to bill to his [facility].   Electronically signed by Brcie Whiting on 9/14/2021 at 12:25 PM

## 2021-09-28 ENCOUNTER — HOSPITAL ENCOUNTER (OUTPATIENT)
Dept: AUDIOLOGY | Age: 36
Discharge: HOME | End: 2021-09-28
Payer: MEDICARE

## 2021-09-28 PROCEDURE — V5264 EAR MOLD/INSERT: HCPCS | Performed by: AUDIOLOGIST

## 2021-09-28 NOTE — PROGRESS NOTES
New ear mold . Billing to insurance. Fit well. Emailing TERRI Hudson to enter $120.00 charge.   (earmold $84 each)  Electronically signed by Brice Horowitz on 9/28/2021 at 11:14 AM

## 2021-12-05 ENCOUNTER — APPOINTMENT (OUTPATIENT)
Dept: GENERAL RADIOLOGY | Age: 36
End: 2021-12-05
Payer: MEDICARE

## 2021-12-05 ENCOUNTER — HOSPITAL ENCOUNTER (EMERGENCY)
Age: 36
Discharge: HOME | End: 2021-12-05
Payer: MEDICARE

## 2021-12-05 VITALS
RESPIRATION RATE: 16 BRPM | OXYGEN SATURATION: 96 % | HEART RATE: 95 BPM | WEIGHT: 183 LBS | SYSTOLIC BLOOD PRESSURE: 144 MMHG | DIASTOLIC BLOOD PRESSURE: 99 MMHG | TEMPERATURE: 98.9 F | BODY MASS INDEX: 32.42 KG/M2

## 2021-12-05 DIAGNOSIS — U07.1 COVID-19: Primary | ICD-10-CM

## 2021-12-05 LAB
ALBUMIN SERPL-MCNC: 4.4 G/DL (ref 3.5–5.2)
ALP BLD-CCNC: 89 U/L (ref 40–129)
ALT SERPL-CCNC: 24 U/L (ref 0–40)
ANION GAP SERPL CALCULATED.3IONS-SCNC: 15 MMOL/L (ref 7–16)
AST SERPL-CCNC: 23 U/L (ref 0–39)
BASOPHILS ABSOLUTE: 0.01 E9/L (ref 0–0.2)
BASOPHILS RELATIVE PERCENT: 0.1 % (ref 0–2)
BILIRUB SERPL-MCNC: 0.5 MG/DL (ref 0–1.2)
BUN BLDV-MCNC: 10 MG/DL (ref 6–20)
CALCIUM SERPL-MCNC: 9.5 MG/DL (ref 8.6–10.2)
CHLORIDE BLD-SCNC: 103 MMOL/L (ref 98–107)
CO2: 19 MMOL/L (ref 22–29)
CREAT SERPL-MCNC: 1 MG/DL (ref 0.7–1.2)
D DIMER: <200 NG/ML DDU
EOSINOPHILS ABSOLUTE: 0 E9/L (ref 0.05–0.5)
EOSINOPHILS RELATIVE PERCENT: 0 % (ref 0–6)
GFR AFRICAN AMERICAN: >60
GFR NON-AFRICAN AMERICAN: >60 ML/MIN/1.73
GLUCOSE BLD-MCNC: 127 MG/DL (ref 74–99)
HCT VFR BLD CALC: 47.2 % (ref 37–54)
HEMOGLOBIN: 16.6 G/DL (ref 12.5–16.5)
IMMATURE GRANULOCYTES #: 0.02 E9/L
IMMATURE GRANULOCYTES %: 0.2 % (ref 0–5)
LYMPHOCYTES ABSOLUTE: 2.38 E9/L (ref 1.5–4)
LYMPHOCYTES RELATIVE PERCENT: 29.6 % (ref 20–42)
MCH RBC QN AUTO: 29.8 PG (ref 26–35)
MCHC RBC AUTO-ENTMCNC: 35.2 % (ref 32–34.5)
MCV RBC AUTO: 84.7 FL (ref 80–99.9)
MONOCYTES ABSOLUTE: 0.82 E9/L (ref 0.1–0.95)
MONOCYTES RELATIVE PERCENT: 10.2 % (ref 2–12)
NEUTROPHILS ABSOLUTE: 4.81 E9/L (ref 1.8–7.3)
NEUTROPHILS RELATIVE PERCENT: 59.9 % (ref 43–80)
PDW BLD-RTO: 11.4 FL (ref 11.5–15)
PLATELET # BLD: 153 E9/L (ref 130–450)
PMV BLD AUTO: 10.8 FL (ref 7–12)
POTASSIUM SERPL-SCNC: 3.5 MMOL/L (ref 3.5–5)
RBC # BLD: 5.57 E12/L (ref 3.8–5.8)
SARS-COV-2, NAAT: DETECTED
SODIUM BLD-SCNC: 137 MMOL/L (ref 132–146)
TOTAL PROTEIN: 8.3 G/DL (ref 6.4–8.3)
WBC # BLD: 8 E9/L (ref 4.5–11.5)

## 2021-12-05 PROCEDURE — 85025 COMPLETE CBC W/AUTO DIFF WBC: CPT

## 2021-12-05 PROCEDURE — 99284 EMERGENCY DEPT VISIT MOD MDM: CPT

## 2021-12-05 PROCEDURE — 85378 FIBRIN DEGRADE SEMIQUANT: CPT

## 2021-12-05 PROCEDURE — 6370000000 HC RX 637 (ALT 250 FOR IP): Performed by: NURSE PRACTITIONER

## 2021-12-05 PROCEDURE — 71046 X-RAY EXAM CHEST 2 VIEWS: CPT

## 2021-12-05 PROCEDURE — 80053 COMPREHEN METABOLIC PANEL: CPT

## 2021-12-05 PROCEDURE — 87635 SARS-COV-2 COVID-19 AMP PRB: CPT

## 2021-12-05 RX ORDER — DEXTROMETHORPHAN HYDROBROMIDE, GUAIFENESIN 5; 100 MG/5ML; MG/5ML
20 LIQUID ORAL EVERY 4 HOURS PRN
Qty: 237 ML | Refills: 0 | Status: SHIPPED | OUTPATIENT
Start: 2021-12-05 | End: 2021-12-12

## 2021-12-05 RX ORDER — ACETAMINOPHEN 500 MG
500 TABLET ORAL EVERY 4 HOURS PRN
Qty: 20 TABLET | Refills: 0 | Status: SHIPPED | OUTPATIENT
Start: 2021-12-05 | End: 2022-01-11 | Stop reason: ALTCHOICE

## 2021-12-05 RX ORDER — ACETAMINOPHEN 500 MG
1000 TABLET ORAL ONCE
Status: COMPLETED | OUTPATIENT
Start: 2021-12-05 | End: 2021-12-05

## 2021-12-05 RX ORDER — IBUPROFEN 800 MG/1
800 TABLET ORAL ONCE
Status: COMPLETED | OUTPATIENT
Start: 2021-12-05 | End: 2021-12-05

## 2021-12-05 RX ORDER — ZINC SULFATE 50(220)MG
50 CAPSULE ORAL DAILY
Qty: 7 CAPSULE | Refills: 0 | Status: SHIPPED | OUTPATIENT
Start: 2021-12-05 | End: 2022-01-11

## 2021-12-05 RX ORDER — ONDANSETRON 4 MG/1
4 TABLET, ORALLY DISINTEGRATING ORAL ONCE
Status: COMPLETED | OUTPATIENT
Start: 2021-12-05 | End: 2021-12-05

## 2021-12-05 RX ORDER — ASCORBIC ACID 500 MG
500 TABLET ORAL 2 TIMES DAILY
Qty: 14 TABLET | Refills: 0 | Status: SHIPPED | OUTPATIENT
Start: 2021-12-05 | End: 2022-11-03

## 2021-12-05 RX ADMIN — ONDANSETRON 4 MG: 4 TABLET, ORALLY DISINTEGRATING ORAL at 18:53

## 2021-12-05 RX ADMIN — ACETAMINOPHEN 1000 MG: 500 TABLET ORAL at 18:53

## 2021-12-05 RX ADMIN — IBUPROFEN 800 MG: 800 TABLET, FILM COATED ORAL at 20:35

## 2021-12-05 ASSESSMENT — PAIN DESCRIPTION - ORIENTATION: ORIENTATION: RIGHT;LEFT

## 2021-12-05 ASSESSMENT — PAIN SCALES - GENERAL
PAINLEVEL_OUTOF10: 10
PAINLEVEL_OUTOF10: 10
PAINLEVEL_OUTOF10: 8

## 2021-12-05 ASSESSMENT — PAIN DESCRIPTION - DESCRIPTORS: DESCRIPTORS: ACHING

## 2021-12-05 ASSESSMENT — PAIN DESCRIPTION - LOCATION: LOCATION: LEG

## 2021-12-05 NOTE — ED TRIAGE NOTES
Department of Emergency Medicine  FIRST PROVIDER TRIAGE NOTE             Independent MLP           12/5/21  4:55 PM EST    Date of Encounter: 12/5/21   MRN: [id number]      HPI: James Jeffries is a 43 y.o. male who presents to the ED for Concern For COVID-19 (+ N/V coughing, chest tightness, leg cramping )       ROS: Negative for abd pain or headache. PE: Gen Appearance/Constitutional: alert  Pulm: respirations non-labored     Initial Plan of Care: All treatment areas with department are currently occupied. Plan to order/Initiate the following while awaiting opening in ED: labs, CXR and antipyretics.     Initial Plan of Care: Initiate Treatment-Testing, Proceed toTreatment Area When Bed Available for ED Attending/MLP to Continue Care    Electronically signed by MARIA TERESA Friedman CNP   DD: 12/5/21

## 2021-12-06 ENCOUNTER — CARE COORDINATION (OUTPATIENT)
Dept: CARE COORDINATION | Age: 36
End: 2021-12-06

## 2021-12-06 NOTE — ED NOTES
Patient brought into triage to be reevaluated. He was updated that he is positive for COVID-19 made aware of his negative chest x-ray results. Still does express having some chest pain and heaviness. Repeat temp still elevated at 100. Will obtain labs will also provide her with Motrin and once again then reevaluate.      Daniel Curran, APRN - CNP  12/05/21 2022

## 2021-12-06 NOTE — ED PROVIDER NOTES
Independent   HPI:  12/5/21, Time: 10:36 PM NEGRA Gallardo is a 43 y.o. male presenting to the ED for several day history of widespread body aches and pains, fevers, chills, cough, congestion as well as nausea and vomiting. Patient reports that he did not get his COVID-19 immunizations. States over the last several days just not feeling well. Patient reports taken over-the-counter meds without complete relief. He does not have any specific chest pain or shortness of breath as well as no noted abdominal pain just feels a heaviness and tightness overall in his lungs. Patient does report nausea and vomiting and poor appetite but doing his best to try to eat and drink. Patient initially febrile here on arrival.  He did not take anything directly prior to arrival.  Patient otherwise able to urinate having good bowel movements. Patient with symptoms moderate in severity and persistent. He does report positive COVID-19 exposure. Review of Systems:   A complete review of systems was performed and pertinent positives and negatives are stated within HPI, all other systems reviewed and are negative.          --------------------------------------------- PAST HISTORY ---------------------------------------------  Past Medical History:  has a past medical history of Cerumen impaction, Hypertension, and Seizure ([facility] [address] Past Surgical History:  has a past surgical history that includes other surgical history (Bilateral, 01/29/2018). Social History:  reports that he has never smoked. He has never used smokeless tobacco. He reports that he does not drink alcohol and does not use drugs. Family History: family history includes No Known Problems in his father and mother. The patients home medications have been reviewed. Allergies: Patient has no known allergies.     -------------------------------------------------- RESULTS -------------------------------------------------  All laboratory and radiology results have been personally reviewed by myself   LABS:  Results for orders placed or performed during the hospital encounter of 12/05/21   COVID-19, Rapid    Specimen: Nasopharyngeal Swab   Result Value Ref Range    SARS-CoV-2, NAAT DETECTED (A) Not Detected   CBC Auto Differential   Result Value Ref Range    WBC 8.0 4.5 - 11.5 E9/L    RBC 5.57 3.80 - 5.80 E12/L    Hemoglobin 16.6 (H) 12.5 - 16.5 g/dL    Hematocrit 47.2 37.0 - 54.0 %    MCV 84.7 80.0 - 99.9 fL    MCH 29.8 26.0 - 35.0 pg    MCHC 35.2 (H) 32.0 - 34.5 %    RDW 11.4 (L) 11.5 - 15.0 fL    Platelets 241 479 - 320 E9/L    MPV 10.8 7.0 - 12.0 fL    Neutrophils % 59.9 43.0 - 80.0 %    Immature Granulocytes % 0.2 0.0 - 5.0 %    Lymphocytes % 29.6 20.0 - 42.0 %    Monocytes % 10.2 2.0 - 12.0 %    Eosinophils % 0.0 0.0 - 6.0 %    Basophils % 0.1 0.0 - 2.0 %    Neutrophils Absolute 4.81 1.80 - 7.30 E9/L    Immature Granulocytes # 0.02 E9/L    Lymphocytes Absolute 2.38 1.50 - 4.00 E9/L    Monocytes Absolute 0.82 0.10 - 0.95 E9/L    Eosinophils Absolute 0.00 (L) 0.05 - 0.50 E9/L    Basophils Absolute 0.01 0.00 - 0.20 E9/L   Comprehensive Metabolic Panel   Result Value Ref Range    Sodium 137 132 - 146 mmol/L    Potassium 3.5 3.5 - 5.0 mmol/L    Chloride 103 98 - 107 mmol/L    CO2 19 (L) 22 - 29 mmol/L    Anion Gap 15 7 - 16 mmol/L    Glucose 127 (H) 74 - 99 mg/dL    BUN 10 6 - 20 mg/dL    CREATININE 1.0 0.7 - 1.2 mg/dL    GFR Non-African American >60 >=60 mL/min/1.73    GFR African American >60     Calcium 9.5 8.6 - 10.2 mg/dL    Total Protein 8.3 6.4 - 8.3 g/dL    Albumin 4.4 3.5 - 5.2 g/dL    Total Bilirubin 0.5 0.0 - 1.2 mg/dL    Alkaline Phosphatase 89 40 - 129 U/L    ALT 24 0 - 40 U/L    AST 23 0 - 39 U/L   D-Dimer, Quantitative   Result Value Ref Range    D-Dimer, Quant <200 ng/mL DDU       RADIOLOGY:  Interpreted by Radiologist.  XR CHEST (2 VW)   Final Result   No acute process.       Scoliosis.             ------------------------- NURSING NOTES AND VITALS REVIEWED ---------------------------   The nursing notes within the ED encounter and vital signs as below have been reviewed. BP (!) 144/99   Pulse 95   Temp 100 °F (37.8 °C) (Oral)   Resp 16   Wt 183 lb (83 kg)   SpO2 96%   BMI 32.42 kg/m²   Oxygen Saturation Interpretation: Normal      ---------------------------------------------------PHYSICAL EXAM--------------------------------------      Constitutional/General: Alert and oriented x3, mildly uncomfortable  Head: Normocephalic and atraumatic  Eyes: PERRL, EOMI  Mouth: Oropharynx clear, handling secretions, no trismus  Neck: Supple, full ROM,   Pulmonary: Lungs clear to auscultation bilaterally, no wheezes, rales, or rhonchi. Not in respiratory distress  Cardiovascular:  Regular rate and rhythm, no murmurs, gallops, or rubs. 2+ distal pulses  Abdomen: Soft, non tender, non distended, no point tenderness  Extremities: Moves all extremities x 4. Warm and well perfused  Skin: warm and dry without rash  Neurologic: GCS 15,  Psych: Normal Affect      ------------------------------ ED COURSE/MEDICAL DECISION MAKING----------------------  Medications   acetaminophen (TYLENOL) tablet 1,000 mg (1,000 mg Oral Given 12/5/21 1853)   ondansetron (ZOFRAN-ODT) disintegrating tablet 4 mg (4 mg Oral Given 12/5/21 1853)   ibuprofen (ADVIL;MOTRIN) tablet 800 mg (800 mg Oral Given 12/5/21 2035)         ED COURSE:       Medical Decision Making: Plan will be for labs, imaging will also medicate for symptom relief and reevaluate. Patient overall pleasant conversation, smiling, respectful and receptive towards results. He was made aware that he is COVID-19 positive. Labs were resulted CBC unremarkable, chemistry panel unremarkable, D-dimer negative, chest x-ray unremarkable. Nursing did perform a ambulatory pulse oxAnd he maintained pulse ox 95% on room air with heart rate always within the eighties. Plan will be for discharge home.   He was educated on isolation precautions as well as supportive measures at home and the importance of good follow-up care as well as when to return back to the emergency department. Patient expressed understanding. Patient will be safely discharged home with a ambulatory pulse ox as well as meds for symptom relief. Patient safely discharged home       Counseling: The emergency provider has spoken with the patient and discussed todays results, in addition to providing specific details for the plan of care and counseling regarding the diagnosis and prognosis. Questions are answered at this time and they are agreeable with the plan.      --------------------------------- IMPRESSION AND DISPOSITION ---------------------------------    IMPRESSION  1. COVID-19        DISPOSITION  Disposition: Discharge to home  Patient condition is good      NOTE: This report was transcribed using voice recognition software.  Every effort was made to ensure accuracy; however, inadvertent computerized transcription errors may be present     Merline Salm, APRN - CNP  12/06/21 [id number]

## 2021-12-06 NOTE — CARE COORDINATION
Date/Time:  12/6/2021 11:45 AM  Attempted to reach patient by telephone for [facility] ED f/u call,pt was given a pulse oximeter in ED. Message states, \"your call cannot be completed as dialed. \" Will route message to clinical staff.

## 2022-01-11 ENCOUNTER — OFFICE VISIT (OUTPATIENT)
Dept: INTERNAL MEDICINE | Age: 37
End: 2022-01-11
Payer: MEDICARE

## 2022-01-11 VITALS
RESPIRATION RATE: 16 BRPM | HEIGHT: 63 IN | SYSTOLIC BLOOD PRESSURE: 134 MMHG | WEIGHT: 175 LBS | OXYGEN SATURATION: 97 % | TEMPERATURE: 98.3 F | DIASTOLIC BLOOD PRESSURE: 89 MMHG | HEART RATE: 108 BPM | BODY MASS INDEX: 31.01 KG/M2

## 2022-01-11 DIAGNOSIS — M25.50 ARTHRALGIA, UNSPECIFIED JOINT: ICD-10-CM

## 2022-01-11 DIAGNOSIS — M62.838 MUSCLE SPASM: ICD-10-CM

## 2022-01-11 DIAGNOSIS — I10 ESSENTIAL HYPERTENSION: ICD-10-CM

## 2022-01-11 DIAGNOSIS — Z13.220 SCREENING FOR HYPERLIPIDEMIA: ICD-10-CM

## 2022-01-11 DIAGNOSIS — Z13.1 SCREENING FOR DIABETES MELLITUS: Primary | ICD-10-CM

## 2022-01-11 DIAGNOSIS — H61.23 BILATERAL IMPACTED CERUMEN: ICD-10-CM

## 2022-01-11 PROBLEM — H61.20 EXCESS EAR WAX: Status: ACTIVE | Noted: 2022-01-11

## 2022-01-11 LAB — HBA1C MFR BLD: 5.9 %

## 2022-01-11 PROCEDURE — 83036 HEMOGLOBIN GLYCOSYLATED A1C: CPT | Performed by: INTERNAL MEDICINE

## 2022-01-11 PROCEDURE — 1036F TOBACCO NON-USER: CPT | Performed by: INTERNAL MEDICINE

## 2022-01-11 PROCEDURE — 99212 OFFICE O/P EST SF 10 MIN: CPT | Performed by: INTERNAL MEDICINE

## 2022-01-11 PROCEDURE — G8482 FLU IMMUNIZE ORDER/ADMIN: HCPCS | Performed by: INTERNAL MEDICINE

## 2022-01-11 PROCEDURE — 99213 OFFICE O/P EST LOW 20 MIN: CPT | Performed by: INTERNAL MEDICINE

## 2022-01-11 PROCEDURE — G8417 CALC BMI ABV UP PARAM F/U: HCPCS | Performed by: INTERNAL MEDICINE

## 2022-01-11 PROCEDURE — G8427 DOCREV CUR MEDS BY ELIG CLIN: HCPCS | Performed by: INTERNAL MEDICINE

## 2022-01-11 RX ORDER — ACETAMINOPHEN 500 MG
500 TABLET ORAL 4 TIMES DAILY PRN
Qty: 30 TABLET | Refills: 1 | Status: SHIPPED | OUTPATIENT
Start: 2022-01-11

## 2022-01-11 RX ORDER — AMLODIPINE BESYLATE 5 MG/1
TABLET ORAL
Qty: 90 TABLET | Refills: 1 | Status: SHIPPED | OUTPATIENT
Start: 2022-01-11

## 2022-01-11 SDOH — ECONOMIC STABILITY: FOOD INSECURITY: WITHIN THE PAST 12 MONTHS, YOU WORRIED THAT YOUR FOOD WOULD RUN OUT BEFORE YOU GOT MONEY TO BUY MORE.: NEVER TRUE

## 2022-01-11 SDOH — ECONOMIC STABILITY: FOOD INSECURITY: WITHIN THE PAST 12 MONTHS, THE FOOD YOU BOUGHT JUST DIDN'T LAST AND YOU DIDN'T HAVE MONEY TO GET MORE.: NEVER TRUE

## 2022-01-11 ASSESSMENT — ENCOUNTER SYMPTOMS
RESPIRATORY NEGATIVE: 1
GASTROINTESTINAL NEGATIVE: 1

## 2022-01-11 ASSESSMENT — SOCIAL DETERMINANTS OF HEALTH (SDOH): HOW HARD IS IT FOR YOU TO PAY FOR THE VERY BASICS LIKE FOOD, HOUSING, MEDICAL CARE, AND HEATING?: NOT VERY HARD

## 2022-01-11 NOTE — PROGRESS NOTES
[facility] [address]  InternalMedicine Residency Program  ACC Note      SUBJECTIVE:  CC: had concerns including Medication Refill and Hypertension. Jaime Bender presented to the [facility] for a routine visit. PMHx of HTN and hearing loss after accident, has seen audiometrist in 09/21. He complains of muscle spasms and body pains since he had covid in dec, off and on, gets better on its own. He was vaccinated for covid and as well booster shot, he denied any other symp other than body pain and spasms, discussed electrolyte abn can cause it too, he can get them checked and take the supplements if needed, can also be secondary to covid infection, to monitor for now, BP cuff given for BP checks at home. He was covid + in dec 5 2021. Impacted cerumen removed by the RN today. Review of Systems   Constitutional: Negative. HENT: Negative. Respiratory: Negative. Cardiovascular: Negative. Gastrointestinal: Negative. Genitourinary: Negative. Musculoskeletal: Negative. Skin: Negative. Neurological: Negative. Hematological: Negative. Psychiatric/Behavioral: Negative. Current Outpatient Medications on File Prior to Visit   Medication Sig Dispense Refill    ascorbic acid (VITAMIN C) 500 MG tablet Take 1 tablet by mouth 2 times daily for 7 days 14 tablet 0    zinc sulfate (ZINCATE) 220 (50 Zn) MG capsule Take 1 capsule by mouth daily for 7 days 7 capsule 0    acetaminophen (TYLENOL) 500 MG tablet Take 1 tablet by mouth every 4 hours as needed for Pain or Fever 20 tablet 0    amLODIPine (NORVASC) 5 MG tablet take 1 tablet by mouth once daily 90 tablet 1     No current facility-administered medications on file prior to visit. OBJECTIVE:    VS: /89   Pulse 108   Temp 98.3 °F (36.8 °C)   Resp 16   Ht 5' 3\" (1.6 m)   Wt 175 lb (79.4 kg)   SpO2 97%   BMI 31.00 kg/m²   Physical Exam  HENT:      Head: Normocephalic. Right Ear: There is impacted cerumen. Left Ear: There is impacted cerumen. Eyes:      Conjunctiva/sclera: Conjunctivae normal.   Cardiovascular:      Rate and Rhythm: Normal rate and regular rhythm. Pulmonary:      Effort: Pulmonary effort is normal.      Breath sounds: Normal breath sounds. Abdominal:      General: Bowel sounds are normal.   Musculoskeletal:         General: Normal range of motion. Skin:     General: Skin is warm and dry. Neurological:      General: No focal deficit present. Mental Status: He is alert and oriented to person, place, and time. Psychiatric:         Mood and Affect: Mood normal.         Behavior: Behavior normal.         ASSESSMENT/PLAN:    I have reviewed all pertient PMHx, PSHx, FamHx, Social Hx, medications, and allergies andupdated history as appropriate. Natalia Kelly was seen today for medication refill and hypertension. Diagnoses and all orders for this visit:    Screening for diabetes mellitus  -     POCT glycosylated hemoglobin (Hb A1C)    Essential hypertension  -     amLODIPine (NORVASC) 5 MG tablet; take 1 tablet by mouth once daily  -     Misc. Devices KIT; BP cuff - may substitute brand for insurance coverage. Bilateral impacted cerumen    Muscle spasm  -     BASIC METABOLIC PANEL; Future  -     MAGNESIUM; Future  -     POTASSIUM, URINE, RANDOM; Future    Arthralgia, unspecified joint  -     acetaminophen (TYLENOL) 500 MG tablet; Take 1 tablet by mouth 4 times daily as needed for Pain    Screening for hyperlipidemia  -     Lipid Panel;  Future      CMP 12/05/21, creatinine of 1, wnl    RTC:     I have reviewed my findings andrecommendations with Cherry Diaz and Dr Murl Prader, M.D., PGY3  1/11/2022 9:00 AM

## 2022-01-11 NOTE — PATIENT INSTRUCTIONS
Thank you for coming to your appointment today. Please make sure to do the following:  - Get labs drawn  - Schedule your appointments  - Continue the medications as listed  - measure BP at home, to call us if SBP > 160      If your symptoms worsen or do not improve, call for a sooner appointment.

## 2022-01-11 NOTE — PROGRESS NOTES
[facility]    Attending Physician Statement  I have discussed the case, including pertinent history and exam findings with the resident physician. I agree with the assessment, plan and orders as documented by the resident. I have reviewed all pertinent PMHx, PSHx, FamHx, SocialHx, medications, and allergies and updated history as appropriate. Patient presents for routine follow up of medical problems. Hypertension - amlodipine 5 mg with stable BP, needs home monitoring for BP  Needs follow up labs. Bilateral hearing loss with hearing aids. COVID in December 2021, reported arthralgia and muscle spasm that have been recurring on and off, BMP showed hypokalemia, need K and Mg level to check. Total time spent 25 minutes in this visit. Remainder of medical problems as per resident note.     Chai Raines MD  1/11/2022 9:25 AM

## 2022-05-17 ENCOUNTER — TELEPHONE (OUTPATIENT)
Dept: INTERNAL MEDICINE | Age: 37
End: 2022-05-17

## 2022-07-02 DIAGNOSIS — I10 ESSENTIAL HYPERTENSION: ICD-10-CM

## 2022-07-05 RX ORDER — AMLODIPINE BESYLATE 5 MG/1
TABLET ORAL
Qty: 90 TABLET | Refills: 1 | OUTPATIENT
Start: 2022-07-05

## 2022-10-19 ENCOUNTER — HOSPITAL ENCOUNTER (INPATIENT)
Age: 37
LOS: 5 days | Discharge: HOME | DRG: 637 | End: 2022-10-24
Attending: EMERGENCY MEDICINE | Admitting: INTERNAL MEDICINE
Payer: MEDICARE

## 2022-10-19 ENCOUNTER — APPOINTMENT (OUTPATIENT)
Dept: CT IMAGING | Age: 37
DRG: 637 | End: 2022-10-19
Payer: MEDICARE

## 2022-10-19 ENCOUNTER — APPOINTMENT (OUTPATIENT)
Dept: GENERAL RADIOLOGY | Age: 37
DRG: 637 | End: 2022-10-19
Payer: MEDICARE

## 2022-10-19 DIAGNOSIS — E10.10 DIABETIC KETOACIDOSIS WITHOUT COMA ASSOCIATED WITH TYPE 1 DIABETES MELLITUS (HCC): Primary | ICD-10-CM

## 2022-10-19 DIAGNOSIS — E87.5 HYPERKALEMIA: ICD-10-CM

## 2022-10-19 DIAGNOSIS — E86.0 DEHYDRATION: ICD-10-CM

## 2022-10-19 PROBLEM — R79.89 ELEVATED LACTIC ACID LEVEL: Status: ACTIVE | Noted: 2022-10-19

## 2022-10-19 PROBLEM — R41.82 AMS (ALTERED MENTAL STATUS): Status: ACTIVE | Noted: 2022-10-19

## 2022-10-19 PROBLEM — E11.10 DKA, TYPE 2 (HCC): Status: ACTIVE | Noted: 2022-10-19

## 2022-10-19 PROBLEM — E83.52 HYPERCALCEMIA: Status: ACTIVE | Noted: 2022-10-19

## 2022-10-19 LAB
ALBUMIN SERPL-MCNC: 5 G/DL (ref 3.5–5.2)
ALP BLD-CCNC: 221 U/L (ref 40–129)
ALT SERPL-CCNC: 30 U/L (ref 0–40)
ANION GAP SERPL CALCULATED.3IONS-SCNC: 32 MMOL/L (ref 7–16)
AST SERPL-CCNC: 20 U/L (ref 0–39)
BASOPHILS ABSOLUTE: 0.04 E9/L (ref 0–0.2)
BASOPHILS RELATIVE PERCENT: 0.3 % (ref 0–2)
BETA-HYDROXYBUTYRATE: >4.5 MMOL/L (ref 0.02–0.27)
BILIRUB SERPL-MCNC: 0.7 MG/DL (ref 0–1.2)
BILIRUBIN URINE: NEGATIVE
BLOOD, URINE: NEGATIVE
BUN BLDV-MCNC: 41 MG/DL (ref 6–20)
CALCIUM SERPL-MCNC: 13.7 MG/DL (ref 8.6–10.2)
CHLORIDE BLD-SCNC: 72 MMOL/L (ref 98–107)
CHP ED QC CHECK: ABNORMAL
CLARITY: CLEAR
CO2: 17 MMOL/L (ref 22–29)
COLOR: YELLOW
CREAT SERPL-MCNC: 1.5 MG/DL (ref 0.7–1.2)
EOSINOPHILS ABSOLUTE: 0 E9/L (ref 0.05–0.5)
EOSINOPHILS RELATIVE PERCENT: 0 % (ref 0–6)
GFR SERPL CREATININE-BSD FRML MDRD: >60 ML/MIN/1.73
GLUCOSE BLD-MCNC: 924 MG/DL (ref 74–99)
GLUCOSE BLD-MCNC: 999 MG/DL
GLUCOSE URINE: >=1000 MG/DL
HCT VFR BLD CALC: 53.8 % (ref 37–54)
HEMOGLOBIN: 18.8 G/DL (ref 12.5–16.5)
IMMATURE GRANULOCYTES #: 0.08 E9/L
IMMATURE GRANULOCYTES %: 0.6 % (ref 0–5)
KETONES, URINE: 40 MG/DL
LACTIC ACID: 2.7 MMOL/L (ref 0.5–2.2)
LEUKOCYTE ESTERASE, URINE: NEGATIVE
LYMPHOCYTES ABSOLUTE: 2.2 E9/L (ref 1.5–4)
LYMPHOCYTES RELATIVE PERCENT: 17.4 % (ref 20–42)
MCH RBC QN AUTO: 29.3 PG (ref 26–35)
MCHC RBC AUTO-ENTMCNC: 34.9 % (ref 32–34.5)
MCV RBC AUTO: 83.9 FL (ref 80–99.9)
METER GLUCOSE: >500 MG/DL (ref 74–99)
MONOCYTES ABSOLUTE: 0.77 E9/L (ref 0.1–0.95)
MONOCYTES RELATIVE PERCENT: 6.1 % (ref 2–12)
NEUTROPHILS ABSOLUTE: 9.59 E9/L (ref 1.8–7.3)
NEUTROPHILS RELATIVE PERCENT: 75.6 % (ref 43–80)
NITRITE, URINE: NEGATIVE
PDW BLD-RTO: 11.5 FL (ref 11.5–15)
PH UA: 6 (ref 5–9)
PH VENOUS: 7.31 (ref 7.35–7.45)
PLATELET # BLD: 343 E9/L (ref 130–450)
PMV BLD AUTO: 11.4 FL (ref 7–12)
POTASSIUM REFLEX MAGNESIUM: 5.8 MMOL/L (ref 3.5–5)
PRO-BNP: 57 PG/ML (ref 0–125)
PROTEIN UA: NEGATIVE MG/DL
RBC # BLD: 6.41 E12/L (ref 3.8–5.8)
SARS-COV-2, NAAT: NOT DETECTED
SODIUM BLD-SCNC: 121 MMOL/L (ref 132–146)
SPECIFIC GRAVITY UA: 1.02 (ref 1–1.03)
TOTAL PROTEIN: 10 G/DL (ref 6.4–8.3)
TROPONIN, HIGH SENSITIVITY: 16 NG/L (ref 0–11)
UROBILINOGEN, URINE: 0.2 E.U./DL
WBC # BLD: 12.7 E9/L (ref 4.5–11.5)

## 2022-10-19 PROCEDURE — 85025 COMPLETE CBC W/AUTO DIFF WBC: CPT

## 2022-10-19 PROCEDURE — 83880 ASSAY OF NATRIURETIC PEPTIDE: CPT

## 2022-10-19 PROCEDURE — 82977 ASSAY OF GGT: CPT

## 2022-10-19 PROCEDURE — 6360000004 HC RX CONTRAST MEDICATION: Performed by: RADIOLOGY

## 2022-10-19 PROCEDURE — 6370000000 HC RX 637 (ALT 250 FOR IP)

## 2022-10-19 PROCEDURE — 99285 EMERGENCY DEPT VISIT HI MDM: CPT

## 2022-10-19 PROCEDURE — 82652 VIT D 1 25-DIHYDROXY: CPT

## 2022-10-19 PROCEDURE — 82010 KETONE BODYS QUAN: CPT

## 2022-10-19 PROCEDURE — 84590 ASSAY OF VITAMIN A: CPT

## 2022-10-19 PROCEDURE — 2580000003 HC RX 258

## 2022-10-19 PROCEDURE — 96361 HYDRATE IV INFUSION ADD-ON: CPT

## 2022-10-19 PROCEDURE — 84145 PROCALCITONIN (PCT): CPT

## 2022-10-19 PROCEDURE — 83605 ASSAY OF LACTIC ACID: CPT

## 2022-10-19 PROCEDURE — 87150 DNA/RNA AMPLIFIED PROBE: CPT

## 2022-10-19 PROCEDURE — 81003 URINALYSIS AUTO W/O SCOPE: CPT

## 2022-10-19 PROCEDURE — 6360000002 HC RX W HCPCS: Performed by: EMERGENCY MEDICINE

## 2022-10-19 PROCEDURE — 82800 BLOOD PH: CPT

## 2022-10-19 PROCEDURE — 82962 GLUCOSE BLOOD TEST: CPT

## 2022-10-19 PROCEDURE — 36415 COLL VENOUS BLD VENIPUNCTURE: CPT

## 2022-10-19 PROCEDURE — 80053 COMPREHEN METABOLIC PANEL: CPT

## 2022-10-19 PROCEDURE — 6360000002 HC RX W HCPCS: Performed by: NURSE PRACTITIONER

## 2022-10-19 PROCEDURE — 87081 CULTURE SCREEN ONLY: CPT

## 2022-10-19 PROCEDURE — 87040 BLOOD CULTURE FOR BACTERIA: CPT

## 2022-10-19 PROCEDURE — 83970 ASSAY OF PARATHORMONE: CPT

## 2022-10-19 PROCEDURE — 83930 ASSAY OF BLOOD OSMOLALITY: CPT

## 2022-10-19 PROCEDURE — 82533 TOTAL CORTISOL: CPT

## 2022-10-19 PROCEDURE — 84484 ASSAY OF TROPONIN QUANT: CPT

## 2022-10-19 PROCEDURE — 83735 ASSAY OF MAGNESIUM: CPT

## 2022-10-19 PROCEDURE — 83690 ASSAY OF LIPASE: CPT

## 2022-10-19 PROCEDURE — 84100 ASSAY OF PHOSPHORUS: CPT

## 2022-10-19 PROCEDURE — 82306 VITAMIN D 25 HYDROXY: CPT

## 2022-10-19 PROCEDURE — 71045 X-RAY EXAM CHEST 1 VIEW: CPT

## 2022-10-19 PROCEDURE — 82550 ASSAY OF CK (CPK): CPT

## 2022-10-19 PROCEDURE — 80048 BASIC METABOLIC PNL TOTAL CA: CPT

## 2022-10-19 PROCEDURE — 84439 ASSAY OF FREE THYROXINE: CPT

## 2022-10-19 PROCEDURE — 99291 CRITICAL CARE FIRST HOUR: CPT | Performed by: NURSE PRACTITIONER

## 2022-10-19 PROCEDURE — 87088 URINE BACTERIA CULTURE: CPT

## 2022-10-19 PROCEDURE — 82330 ASSAY OF CALCIUM: CPT

## 2022-10-19 PROCEDURE — 93005 ELECTROCARDIOGRAM TRACING: CPT | Performed by: NURSE PRACTITIONER

## 2022-10-19 PROCEDURE — 87635 SARS-COV-2 COVID-19 AMP PRB: CPT

## 2022-10-19 PROCEDURE — 84443 ASSAY THYROID STIM HORMONE: CPT

## 2022-10-19 PROCEDURE — 2000000000 HC ICU R&B

## 2022-10-19 PROCEDURE — 96374 THER/PROPH/DIAG INJ IV PUSH: CPT

## 2022-10-19 PROCEDURE — 83036 HEMOGLOBIN GLYCOSYLATED A1C: CPT

## 2022-10-19 PROCEDURE — 70450 CT HEAD/BRAIN W/O DYE: CPT

## 2022-10-19 PROCEDURE — 74177 CT ABD & PELVIS W/CONTRAST: CPT

## 2022-10-19 PROCEDURE — 2580000003 HC RX 258: Performed by: RADIOLOGY

## 2022-10-19 RX ORDER — SODIUM CHLORIDE 0.9 % (FLUSH) 0.9 %
10 SYRINGE (ML) INJECTION PRN
Status: DISCONTINUED | OUTPATIENT
Start: 2022-10-19 | End: 2022-10-24 | Stop reason: HOSPADM

## 2022-10-19 RX ORDER — HEPARIN SODIUM 10000 [USP'U]/ML
5000 INJECTION, SOLUTION INTRAVENOUS; SUBCUTANEOUS EVERY 8 HOURS
Status: DISCONTINUED | OUTPATIENT
Start: 2022-10-19 | End: 2022-10-24 | Stop reason: HOSPADM

## 2022-10-19 RX ORDER — SODIUM CHLORIDE 9 MG/ML
INJECTION, SOLUTION INTRAVENOUS CONTINUOUS
Status: DISCONTINUED | OUTPATIENT
Start: 2022-10-20 | End: 2022-10-20

## 2022-10-19 RX ORDER — DIMETHICONE, OXYBENZONE, AND PADIMATE O 2; 2.5; 6.6 G/100G; G/100G; G/100G
STICK TOPICAL PRN
Status: DISCONTINUED | OUTPATIENT
Start: 2022-10-19 | End: 2022-10-24 | Stop reason: HOSPADM

## 2022-10-19 RX ORDER — 0.9 % SODIUM CHLORIDE 0.9 %
1000 INTRAVENOUS SOLUTION INTRAVENOUS ONCE
Status: COMPLETED | OUTPATIENT
Start: 2022-10-19 | End: 2022-10-19

## 2022-10-19 RX ORDER — 0.9 % SODIUM CHLORIDE 0.9 %
1000 INTRAVENOUS SOLUTION INTRAVENOUS ONCE
Status: DISCONTINUED | OUTPATIENT
Start: 2022-10-19 | End: 2022-10-20

## 2022-10-19 RX ORDER — 0.9 % SODIUM CHLORIDE 0.9 %
15 INTRAVENOUS SOLUTION INTRAVENOUS ONCE
Status: COMPLETED | OUTPATIENT
Start: 2022-10-19 | End: 2022-10-19

## 2022-10-19 RX ORDER — MAGNESIUM SULFATE 1 G/100ML
1000 INJECTION INTRAVENOUS PRN
Status: DISCONTINUED | OUTPATIENT
Start: 2022-10-19 | End: 2022-10-24 | Stop reason: HOSPADM

## 2022-10-19 RX ORDER — SODIUM CHLORIDE 9 MG/ML
INJECTION, SOLUTION INTRAVENOUS CONTINUOUS
Status: DISCONTINUED | OUTPATIENT
Start: 2022-10-19 | End: 2022-10-20

## 2022-10-19 RX ORDER — DEXTROSE AND SODIUM CHLORIDE 5; .45 G/100ML; G/100ML
INJECTION, SOLUTION INTRAVENOUS CONTINUOUS PRN
Status: DISCONTINUED | OUTPATIENT
Start: 2022-10-19 | End: 2022-10-24 | Stop reason: HOSPADM

## 2022-10-19 RX ORDER — POTASSIUM CHLORIDE 7.45 MG/ML
10 INJECTION INTRAVENOUS PRN
Status: DISCONTINUED | OUTPATIENT
Start: 2022-10-19 | End: 2022-10-22

## 2022-10-19 RX ORDER — LORAZEPAM 2 MG/ML
1 INJECTION INTRAMUSCULAR ONCE
Status: COMPLETED | OUTPATIENT
Start: 2022-10-19 | End: 2022-10-19

## 2022-10-19 RX ORDER — POLYETHYLENE GLYCOL 3350 17 G/17G
17 POWDER, FOR SOLUTION ORAL DAILY PRN
Status: DISCONTINUED | OUTPATIENT
Start: 2022-10-19 | End: 2022-10-24 | Stop reason: HOSPADM

## 2022-10-19 RX ORDER — DEXTROSE AND SODIUM CHLORIDE 5; .45 G/100ML; G/100ML
INJECTION, SOLUTION INTRAVENOUS CONTINUOUS PRN
Status: DISCONTINUED | OUTPATIENT
Start: 2022-10-19 | End: 2022-10-20

## 2022-10-19 RX ADMIN — HEPARIN SODIUM 5000 UNITS: 10000 INJECTION INTRAVENOUS; SUBCUTANEOUS at 23:35

## 2022-10-19 RX ADMIN — IOPAMIDOL 65 ML: 755 INJECTION, SOLUTION INTRAVENOUS at 20:45

## 2022-10-19 RX ADMIN — SODIUM CHLORIDE 1191 ML: 9 INJECTION, SOLUTION INTRAVENOUS at 21:27

## 2022-10-19 RX ADMIN — SODIUM CHLORIDE 1000 ML: 9 INJECTION, SOLUTION INTRAVENOUS at 19:04

## 2022-10-19 RX ADMIN — SODIUM CHLORIDE, PRESERVATIVE FREE 10 ML: 5 INJECTION INTRAVENOUS at 20:45

## 2022-10-19 RX ADMIN — LORAZEPAM 1 MG: 2 INJECTION INTRAMUSCULAR; INTRAVENOUS at 19:54

## 2022-10-19 RX ADMIN — SODIUM CHLORIDE 1000 ML: 9 INJECTION, SOLUTION INTRAVENOUS at 20:18

## 2022-10-19 RX ADMIN — SODIUM CHLORIDE 0.1 UNITS/KG/HR: 9 INJECTION, SOLUTION INTRAVENOUS at 21:27

## 2022-10-19 RX ADMIN — SODIUM CHLORIDE: 9 INJECTION, SOLUTION INTRAVENOUS at 22:34

## 2022-10-19 ASSESSMENT — ENCOUNTER SYMPTOMS
EYE DISCHARGE: 0
NAUSEA: 1
COLOR CHANGE: 0
SORE THROAT: 0
VOMITING: 1
ABDOMINAL PAIN: 1
SINUS PAIN: 0
DIARRHEA: 0
SHORTNESS OF BREATH: 0
CONSTIPATION: 0
COUGH: 0

## 2022-10-19 ASSESSMENT — PAIN SCALES - GENERAL: PAINLEVEL_OUTOF10: 0

## 2022-10-19 ASSESSMENT — PAIN - FUNCTIONAL ASSESSMENT: PAIN_FUNCTIONAL_ASSESSMENT: NONE - DENIES PAIN

## 2022-10-19 NOTE — ED PROVIDER NOTES
Annie Rausch is a 77-year-old male with a history of hypertension hyperlipidemia. Patient is a 42 y.o. male presents with a chief complaint of dizziness, nausea and vomiting  This has been occurring for the past 2 days. Patient states that it gets better with nothing. Patient states that it gets worse with nothing. Patient states that it is severe in severity. Patient states it was acute in onset. He notes that for the past 2 days he has had persistent nausea and vomiting as well as abdominal pain. He also notes that last night he started to have increased urination and has been wetting himself as well as feeling very dizzy and off balance. Today he also reports being very thirsty. Yesterday he began having a headache. Notes he has never had anything like this before. Sister at bedside reports patient has a developmental delay, but knows he has a history of hypertension and hyperlipidemia. He denies any numbness or tingling, fevers, chills, sinus pain or pressure, cough, congestion, chest pain, shortness of breath, leg swelling, rashes, blurry or double vision, constipation or diarrhea. Review of Systems   Constitutional:  Negative for chills and fever. HENT:  Negative for congestion, sinus pain and sore throat. Eyes:  Negative for discharge and visual disturbance. Respiratory:  Negative for cough and shortness of breath. Cardiovascular:  Negative for chest pain and leg swelling. Gastrointestinal:  Positive for abdominal pain, nausea and vomiting. Negative for constipation and diarrhea. Endocrine: Positive for polydipsia and polyuria. Genitourinary:  Negative for difficulty urinating, dysuria, frequency and hematuria. Musculoskeletal:  Negative for arthralgias and joint swelling. Skin:  Negative for color change and rash. Neurological:  Positive for dizziness and headaches. Negative for weakness, light-headedness and numbness. All other systems reviewed and are negative. Physical Exam  Constitutional:       General: He is not in acute distress. Appearance: Normal appearance. HENT:      Head: Normocephalic and atraumatic. Mouth/Throat:      Mouth: Mucous membranes are dry. Pharynx: Oropharynx is clear. Eyes:      Extraocular Movements: Extraocular movements intact. Conjunctiva/sclera: Conjunctivae normal.      Pupils: Pupils are equal, round, and reactive to light. Cardiovascular:      Rate and Rhythm: Regular rhythm. Tachycardia present. Pulses: Normal pulses. Heart sounds: Normal heart sounds. Pulmonary:      Effort: Pulmonary effort is normal.      Breath sounds: Normal breath sounds. Abdominal:      General: Abdomen is flat. Palpations: Abdomen is soft. Musculoskeletal:         General: No swelling. Normal range of motion. Cervical back: Normal range of motion and neck supple. Skin:     General: Skin is warm and dry. Neurological:      General: No focal deficit present. Mental Status: He is alert and oriented to person, place, and time. Cranial Nerves: No cranial nerve deficit. Motor: No weakness. Psychiatric:         Mood and Affect: Mood normal.         Behavior: Behavior normal.        Procedures     MDM  Number of Diagnoses or Management Options  Diabetic ketoacidosis without coma associated with type 1 diabetes mellitus ([facility]  Diagnosis management comments: Yoon Kelly is a 78-year-old male presenting to the ED with complaints of nausea, vomiting, and dizziness. Initial point-of-care glucose reading high meaning greater than 500. CBC reveals leukocytosis of 12.7 and hemoglobin of 18.8. CMP revealing hyperkalemia 5.8, anion gap of 32, glucose of 924, creatinine of 1.5, calcium of 13.7, alkaline phosphatase of 221. Beta hydroxybutyrate greater than 4.5, lactic acid of 2.7, venous pH of 7.31. Patient was started on insulin drip and intensivist was consulted.   COVID test was negative, and urinalysis was relatively unremarkable aside from greater than 1000 glucose in urine. Dr. Amira Rao agreed to accept patient to ICU. Spoke with Dr. Gaither Holter for Dr. Roseanne Navarrete who agreed to admit patient to medicine. CT head unremarkable, CT of the abdomen does reveal diverticulosis but no acute processes and chest x-ray reveals no pneumonia or pleural effusion. ED Course as of 10/19/22 2301   Wed Oct 19, 2022   1928 EKG: This EKG is signed by emergency department physician. Rate: 109  Rhythm: Sinus  Interpretation: non-specific EKG  Comparison: changes compared to previous EKG 9/3/18     [CP]   2028 Spoke with Dr. Joe Perea, intensivist, who agreed to accept patient to ICU. [CP]   2029 Spoke with hospitalist who agreed to admit pt. [CP]      ED Course User Index  [CP] Vicky Harrison DO      ED Course as of 10/19/22 2301   Wed Oct 19, 2022   1928 EKG: This EKG is signed by emergency department physician. Rate: 109  Rhythm: Sinus  Interpretation: non-specific EKG  Comparison: changes compared to previous EKG 9/3/18     [CP]   2028 Spoke with Dr. Joe Perea, intensivist, who agreed to accept patient to ICU. [CP]   2029 Spoke with hospitalist who agreed to admit pt. [CP]      ED Course User Index  [CP] Vicky Harrison DO       --------------------------------------------- PAST HISTORY ---------------------------------------------  Past Medical History:  has a past medical history of Cerumen impaction, Hypertension, and Seizure ([facility] Past Surgical History:  has a past surgical history that includes other surgical history (Bilateral, 01/29/2018). Social History:  reports that he has never smoked. He has never used smokeless tobacco. He reports that he does not drink alcohol and does not use drugs. Family History: family history includes No Known Problems in his father and mother. The patients home medications have been reviewed.     Allergies: Patient has no known allergies.     -------------------------------------------------- RESULTS -------------------------------------------------    LABS:  Results for orders placed or performed during the hospital encounter of 10/19/22   COVID-19, Rapid    Specimen: Nasopharyngeal Swab   Result Value Ref Range    SARS-CoV-2, NAAT Not Detected Not Detected   Urinalysis   Result Value Ref Range    Color, UA Yellow Straw/Yellow    Clarity, UA Clear Clear    Glucose, Ur >=1000 (A) Negative mg/dL    Bilirubin Urine Negative Negative    Ketones, Urine 40 (A) Negative mg/dL    Specific Gravity, UA 1.020 1.005 - 1.030    Blood, Urine Negative Negative    pH, UA 6.0 5.0 - 9.0    Protein, UA Negative Negative mg/dL    Urobilinogen, Urine 0.2 <2.0 E.U./dL    Nitrite, Urine Negative Negative    Leukocyte Esterase, Urine Negative Negative   Troponin   Result Value Ref Range    Troponin, High Sensitivity 16 (H) 0 - 11 ng/L   Comprehensive Metabolic Panel w/ Reflex to MG   Result Value Ref Range    Sodium 121 (L) 132 - 146 mmol/L    Potassium reflex Magnesium 5.8 (H) 3.5 - 5.0 mmol/L    Chloride 72 (LL) 98 - 107 mmol/L    CO2 17 (L) 22 - 29 mmol/L    Anion Gap 32 (H) 7 - 16 mmol/L    Glucose 924 (HH) 74 - 99 mg/dL    BUN 41 (H) 6 - 20 mg/dL    Creatinine 1.5 (H) 0.7 - 1.2 mg/dL    Est, Glom Filt Rate >60 >=60 mL/min/1.73    Calcium 13.7 (HH) 8.6 - 10.2 mg/dL    Total Protein 10.0 (H) 6.4 - 8.3 g/dL    Albumin 5.0 3.5 - 5.2 g/dL    Total Bilirubin 0.7 0.0 - 1.2 mg/dL    Alkaline Phosphatase 221 (H) 40 - 129 U/L    ALT 30 0 - 40 U/L    AST 20 0 - 39 U/L   CBC with Auto Differential   Result Value Ref Range    WBC 12.7 (H) 4.5 - 11.5 E9/L    RBC 6.41 (H) 3.80 - 5.80 E12/L    Hemoglobin 18.8 (H) 12.5 - 16.5 g/dL    Hematocrit 53.8 37.0 - 54.0 %    MCV 83.9 80.0 - 99.9 fL    MCH 29.3 26.0 - 35.0 pg    MCHC 34.9 (H) 32.0 - 34.5 %    RDW 11.5 11.5 - 15.0 fL    Platelets 814 742 - 472 E9/L    MPV 11.4 7.0 - 12.0 fL    Neutrophils % 75.6 43.0 - 80.0 % Immature Granulocytes % 0.6 0.0 - 5.0 %    Lymphocytes % 17.4 (L) 20.0 - 42.0 %    Monocytes % 6.1 2.0 - 12.0 %    Eosinophils % 0.0 0.0 - 6.0 %    Basophils % 0.3 0.0 - 2.0 %    Neutrophils Absolute 9.59 (H) 1.80 - 7.30 E9/L    Immature Granulocytes # 0.08 E9/L    Lymphocytes Absolute 2.20 1.50 - 4.00 E9/L    Monocytes Absolute 0.77 0.10 - 0.95 E9/L    Eosinophils Absolute 0.00 (L) 0.05 - 0.50 E9/L    Basophils Absolute 0.04 0.00 - 0.20 E9/L   Brain Natriuretic Peptide   Result Value Ref Range    Pro-BNP 57 0 - 125 pg/mL   Lactic Acid   Result Value Ref Range    Lactic Acid 2.7 (H) 0.5 - 2.2 mmol/L   pH, venous   Result Value Ref Range    pH, Noah 7.31 (L) 7.35 - 7.45   Beta-Hydroxybutyrate   Result Value Ref Range    Beta-Hydroxybutyrate >4.50 (H) 0.02 - 0.27 mmol/L   POCT Glucose   Result Value Ref Range    Meter Glucose >500 (H) 74 - 99 mg/dL   POCT Glucose   Result Value Ref Range    Glucose 999 mg/dL    QC OK? ok    EKG 12 Lead   Result Value Ref Range    Ventricular Rate 109 BPM    Atrial Rate 109 BPM    P-R Interval 140 ms    QRS Duration 78 ms    Q-T Interval 310 ms    QTc Calculation (Bazett) 417 ms    P Axis 66 degrees    R Axis 66 degrees    T Axis 21 degrees       RADIOLOGY:  CT Head WO Contrast   Final Result   No acute intracranial abnormality. CT ABDOMEN PELVIS W IV CONTRAST Additional Contrast? None   Final Result   1. No acute process in abdomen or pelvis. 2. Diverticulosis. XR CHEST PORTABLE   Final Result   No evidence of pneumonia or pleural effusion.               ------------------------- NURSING NOTES AND VITALS REVIEWED ---------------------------  Date / Time Roomed:  10/19/2022  6:17 PM  ED Bed Assignment:  0215/0215-A    The nursing notes within the ED encounter and vital signs as below have been reviewed.      Patient Vitals for the past 24 hrs:   BP Temp Temp src Pulse Resp SpO2 Height Weight   10/19/22 2225 137/79 97.9 °F (36.6 °C) Axillary 99 21 -- -- 157 lb 10.1 oz (71.5 kg)   10/19/22 2015 (!) 131/98 -- -- (!) 109 16 98 % -- --   10/19/22 1902 (!) 148/90 98 °F (36.7 °C) Oral 98 24 95 % -- --   10/19/22 1814 (!) 151/94 97.9 °F (36.6 °C) -- (!) 118 16 97 % 5' 3\" (1.6 m) 175 lb (79.4 kg)       Oxygen Saturation Interpretation: Normal    ------------------------------------------ PROGRESS NOTES ------------------------------------------  Re-evaluation(s):  Time: 1955  Patients symptoms show no change  Repeat physical examination is not changed    Counseling:  I have spoken with the patient, mother, and sister and discussed todays results, in addition to providing specific details for the plan of care and counseling regarding the diagnosis and prognosis. Their questions are answered at this time and they are agreeable with the plan of admission.    --------------------------------- ADDITIONAL PROVIDER NOTES ---------------------------------  Consultations:  Time: 2029. Spoke with Dr. Ted Franco for Dr. Lamar Baugh. Discussed case. They will admit the patient. This patient's ED course included: a personal history and physicial examination, multiple bedside re-evaluations, IV medications, cardiac monitoring, and continuous pulse oximetry    This patient has remained hemodynamically stable during their ED course. Diagnosis:  1. Diabetic ketoacidosis without coma associated with type 1 diabetes mellitus ([facility]    2. Hyperkalemia    3. Dehydration        Disposition:  Patient's disposition: Admit to CCU/ICU  Patient's condition is stable. Patient was seen and evaluated by myself and my attending Jordan Franklin MD.  Assessment and Plan discussed with attending provider, please see attestation for final plan of care. This note was done using dictation software and there may be some grammatical errors associated with this.     Kandi Jacobs 55, DO  Resident  10/19/22 8526

## 2022-10-20 PROBLEM — E78.5 HYPERLIPIDEMIA: Status: ACTIVE | Noted: 2022-10-20

## 2022-10-20 PROBLEM — E11.9 DIABETES MELLITUS, NEW ONSET (HCC): Status: ACTIVE | Noted: 2022-10-20

## 2022-10-20 LAB
ADENOVIRUS BY PCR: NOT DETECTED
AMPHETAMINE SCREEN, URINE: NOT DETECTED
ANION GAP SERPL CALCULATED.3IONS-SCNC: 13 MMOL/L (ref 7–16)
ANION GAP SERPL CALCULATED.3IONS-SCNC: 21 MMOL/L (ref 7–16)
ANION GAP SERPL CALCULATED.3IONS-SCNC: 8 MMOL/L (ref 7–16)
BARBITURATE SCREEN URINE: NOT DETECTED
BASOPHILS ABSOLUTE: 0.02 E9/L (ref 0–0.2)
BASOPHILS RELATIVE PERCENT: 0.2 % (ref 0–2)
BENZODIAZEPINE SCREEN, URINE: NOT DETECTED
BORDETELLA PARAPERTUSSIS BY PCR: NOT DETECTED
BORDETELLA PERTUSSIS BY PCR: NOT DETECTED
BUN BLDV-MCNC: 24 MG/DL (ref 6–20)
BUN BLDV-MCNC: 28 MG/DL (ref 6–20)
BUN BLDV-MCNC: 32 MG/DL (ref 6–20)
CALCIUM IONIZED: 1.32 MMOL/L (ref 1.15–1.33)
CALCIUM IONIZED: 1.36 MMOL/L (ref 1.15–1.33)
CALCIUM IONIZED: 1.49 MMOL/L (ref 1.15–1.33)
CALCIUM SERPL-MCNC: 10 MG/DL (ref 8.6–10.2)
CALCIUM SERPL-MCNC: 8.8 MG/DL (ref 8.6–10.2)
CALCIUM SERPL-MCNC: 9.5 MG/DL (ref 8.6–10.2)
CANNABINOID SCREEN URINE: NOT DETECTED
CHLAMYDOPHILIA PNEUMONIAE BY PCR: NOT DETECTED
CHLORIDE BLD-SCNC: 109 MMOL/L (ref 98–107)
CHLORIDE BLD-SCNC: 93 MMOL/L (ref 98–107)
CHLORIDE BLD-SCNC: 99 MMOL/L (ref 98–107)
CHLORIDE URINE RANDOM: 38 MMOL/L
CO2: 18 MMOL/L (ref 22–29)
CO2: 21 MMOL/L (ref 22–29)
CO2: 23 MMOL/L (ref 22–29)
COCAINE METABOLITE SCREEN URINE: NOT DETECTED
CORONAVIRUS 229E BY PCR: NOT DETECTED
CORONAVIRUS HKU1 BY PCR: NOT DETECTED
CORONAVIRUS NL63 BY PCR: NOT DETECTED
CORONAVIRUS OC43 BY PCR: NOT DETECTED
CORTISOL TOTAL: 19.4 MCG/DL (ref 2.68–18.4)
CREAT SERPL-MCNC: 0.9 MG/DL (ref 0.7–1.2)
CREAT SERPL-MCNC: 1 MG/DL (ref 0.7–1.2)
CREAT SERPL-MCNC: 1.2 MG/DL (ref 0.7–1.2)
CREATININE URINE: 123 MG/DL (ref 40–278)
CREATININE URINE: 94 MG/DL (ref 40–278)
EKG ATRIAL RATE: 109 BPM
EKG P AXIS: 66 DEGREES
EKG P-R INTERVAL: 140 MS
EKG Q-T INTERVAL: 310 MS
EKG QRS DURATION: 78 MS
EKG QTC CALCULATION (BAZETT): 417 MS
EKG R AXIS: 66 DEGREES
EKG T AXIS: 21 DEGREES
EKG VENTRICULAR RATE: 109 BPM
EOSINOPHILS ABSOLUTE: 0.04 E9/L (ref 0.05–0.5)
EOSINOPHILS RELATIVE PERCENT: 0.4 % (ref 0–6)
FENTANYL SCREEN, URINE: NOT DETECTED
GAMMA GLUTAMYL TRANSFERASE: 36 U/L (ref 10–71)
GFR SERPL CREATININE-BSD FRML MDRD: >60 ML/MIN/1.73
GLUCOSE BLD-MCNC: 236 MG/DL (ref 74–99)
GLUCOSE BLD-MCNC: 321 MG/DL (ref 74–99)
GLUCOSE BLD-MCNC: 457 MG/DL (ref 74–99)
HBA1C MFR BLD: 11.2 % (ref 4–5.6)
HCT VFR BLD CALC: 39.1 % (ref 37–54)
HEMOGLOBIN: 13.9 G/DL (ref 12.5–16.5)
HUMAN METAPNEUMOVIRUS BY PCR: NOT DETECTED
HUMAN RHINOVIRUS/ENTEROVIRUS BY PCR: NOT DETECTED
IMMATURE GRANULOCYTES #: 0.04 E9/L
IMMATURE GRANULOCYTES %: 0.4 % (ref 0–5)
INFLUENZA A BY PCR: NOT DETECTED
INFLUENZA B BY PCR: NOT DETECTED
LACTIC ACID: 1.2 MMOL/L (ref 0.5–2.2)
LACTIC ACID: 1.7 MMOL/L (ref 0.5–2.2)
LACTIC ACID: 2.7 MMOL/L (ref 0.5–2.2)
LIPASE: 505 U/L (ref 13–60)
LYMPHOCYTES ABSOLUTE: 2.47 E9/L (ref 1.5–4)
LYMPHOCYTES RELATIVE PERCENT: 23.5 % (ref 20–42)
Lab: NORMAL
MAGNESIUM: 2.1 MG/DL (ref 1.6–2.6)
MAGNESIUM: 2.1 MG/DL (ref 1.6–2.6)
MAGNESIUM: 2.3 MG/DL (ref 1.6–2.6)
MCH RBC QN AUTO: 29.6 PG (ref 26–35)
MCHC RBC AUTO-ENTMCNC: 35.5 % (ref 32–34.5)
MCV RBC AUTO: 83.2 FL (ref 80–99.9)
METER GLUCOSE: 180 MG/DL (ref 74–99)
METER GLUCOSE: 187 MG/DL (ref 74–99)
METER GLUCOSE: 221 MG/DL (ref 74–99)
METER GLUCOSE: 250 MG/DL (ref 74–99)
METER GLUCOSE: 253 MG/DL (ref 74–99)
METER GLUCOSE: 261 MG/DL (ref 74–99)
METER GLUCOSE: 280 MG/DL (ref 74–99)
METER GLUCOSE: 303 MG/DL (ref 74–99)
METER GLUCOSE: 306 MG/DL (ref 74–99)
METER GLUCOSE: 314 MG/DL (ref 74–99)
METER GLUCOSE: 333 MG/DL (ref 74–99)
METER GLUCOSE: 369 MG/DL (ref 74–99)
METER GLUCOSE: 370 MG/DL (ref 74–99)
METER GLUCOSE: 370 MG/DL (ref 74–99)
METHADONE SCREEN, URINE: NOT DETECTED
MICROALBUMIN UR-MCNC: <12 MG/L
MICROALBUMIN/CREAT UR-RTO: ABNORMAL (ref 0–30)
MONOCYTES ABSOLUTE: 0.8 E9/L (ref 0.1–0.95)
MONOCYTES RELATIVE PERCENT: 7.6 % (ref 2–12)
MYCOPLASMA PNEUMONIAE BY PCR: NOT DETECTED
NEUTROPHILS ABSOLUTE: 7.13 E9/L (ref 1.8–7.3)
NEUTROPHILS RELATIVE PERCENT: 67.9 % (ref 43–80)
OPIATE SCREEN URINE: NOT DETECTED
OSMOLALITY URINE: 980 MOSM/KG (ref 300–900)
OSMOLALITY: 341 MOSM/KG (ref 285–310)
OXYCODONE URINE: NOT DETECTED
PARAINFLUENZA VIRUS 1 BY PCR: NOT DETECTED
PARAINFLUENZA VIRUS 2 BY PCR: NOT DETECTED
PARAINFLUENZA VIRUS 3 BY PCR: NOT DETECTED
PARAINFLUENZA VIRUS 4 BY PCR: NOT DETECTED
PARATHYROID HORMONE INTACT: 10 PG/ML (ref 15–65)
PDW BLD-RTO: 11.7 FL (ref 11.5–15)
PHENCYCLIDINE SCREEN URINE: NOT DETECTED
PHOSPHORUS: 2.3 MG/DL (ref 2.5–4.5)
PHOSPHORUS: 2.7 MG/DL (ref 2.5–4.5)
PHOSPHORUS: 3.7 MG/DL (ref 2.5–4.5)
PLATELET # BLD: 194 E9/L (ref 130–450)
PMV BLD AUTO: 10.9 FL (ref 7–12)
POTASSIUM SERPL-SCNC: 4 MMOL/L (ref 3.5–5)
POTASSIUM SERPL-SCNC: 4.1 MMOL/L (ref 3.5–5)
POTASSIUM SERPL-SCNC: 4.2 MMOL/L (ref 3.5–5)
POTASSIUM, UR: 64.7 MMOL/L
PROCALCITONIN: 0.25 NG/ML (ref 0–0.08)
RBC # BLD: 4.7 E12/L (ref 3.8–5.8)
RESPIRATORY SYNCYTIAL VIRUS BY PCR: NOT DETECTED
SARS-COV-2, PCR: NOT DETECTED
SODIUM BLD-SCNC: 132 MMOL/L (ref 132–146)
SODIUM BLD-SCNC: 133 MMOL/L (ref 132–146)
SODIUM BLD-SCNC: 140 MMOL/L (ref 132–146)
SODIUM URINE: <20 MMOL/L
T4 FREE: 0.92 NG/DL (ref 0.93–1.7)
TOTAL CK: 85 U/L (ref 20–200)
TROPONIN, HIGH SENSITIVITY: 14 NG/L (ref 0–11)
TSH SERPL DL<=0.05 MIU/L-ACNC: 0.19 UIU/ML (ref 0.27–4.2)
VITAMIN D 25-HYDROXY: 17 NG/ML (ref 30–100)
WBC # BLD: 10.5 E9/L (ref 4.5–11.5)

## 2022-10-20 PROCEDURE — 85025 COMPLETE CBC W/AUTO DIFF WBC: CPT

## 2022-10-20 PROCEDURE — 80048 BASIC METABOLIC PNL TOTAL CA: CPT

## 2022-10-20 PROCEDURE — 6360000002 HC RX W HCPCS: Performed by: NURSE PRACTITIONER

## 2022-10-20 PROCEDURE — 6360000002 HC RX W HCPCS

## 2022-10-20 PROCEDURE — 2580000003 HC RX 258

## 2022-10-20 PROCEDURE — 83605 ASSAY OF LACTIC ACID: CPT

## 2022-10-20 PROCEDURE — 84300 ASSAY OF URINE SODIUM: CPT

## 2022-10-20 PROCEDURE — 82044 UR ALBUMIN SEMIQUANTITATIVE: CPT

## 2022-10-20 PROCEDURE — 84100 ASSAY OF PHOSPHORUS: CPT

## 2022-10-20 PROCEDURE — 36415 COLL VENOUS BLD VENIPUNCTURE: CPT

## 2022-10-20 PROCEDURE — 80307 DRUG TEST PRSMV CHEM ANLYZR: CPT

## 2022-10-20 PROCEDURE — 84133 ASSAY OF URINE POTASSIUM: CPT

## 2022-10-20 PROCEDURE — 0202U NFCT DS 22 TRGT SARS-COV-2: CPT

## 2022-10-20 PROCEDURE — 1200000000 HC SEMI PRIVATE

## 2022-10-20 PROCEDURE — 82570 ASSAY OF URINE CREATININE: CPT

## 2022-10-20 PROCEDURE — 82436 ASSAY OF URINE CHLORIDE: CPT

## 2022-10-20 PROCEDURE — 6370000000 HC RX 637 (ALT 250 FOR IP): Performed by: INTERNAL MEDICINE

## 2022-10-20 PROCEDURE — 99233 SBSQ HOSP IP/OBS HIGH 50: CPT | Performed by: FAMILY MEDICINE

## 2022-10-20 PROCEDURE — 82962 GLUCOSE BLOOD TEST: CPT

## 2022-10-20 PROCEDURE — 82330 ASSAY OF CALCIUM: CPT

## 2022-10-20 PROCEDURE — 83935 ASSAY OF URINE OSMOLALITY: CPT

## 2022-10-20 PROCEDURE — 2500000003 HC RX 250 WO HCPCS

## 2022-10-20 PROCEDURE — 99222 1ST HOSP IP/OBS MODERATE 55: CPT | Performed by: INTERNAL MEDICINE

## 2022-10-20 PROCEDURE — 83735 ASSAY OF MAGNESIUM: CPT

## 2022-10-20 PROCEDURE — 2580000003 HC RX 258: Performed by: NURSE PRACTITIONER

## 2022-10-20 RX ORDER — INSULIN LISPRO 100 [IU]/ML
0-8 INJECTION, SOLUTION INTRAVENOUS; SUBCUTANEOUS
Status: DISCONTINUED | OUTPATIENT
Start: 2022-10-20 | End: 2022-10-20

## 2022-10-20 RX ORDER — INSULIN LISPRO 100 [IU]/ML
0-12 INJECTION, SOLUTION INTRAVENOUS; SUBCUTANEOUS
Status: DISCONTINUED | OUTPATIENT
Start: 2022-10-20 | End: 2022-10-24 | Stop reason: HOSPADM

## 2022-10-20 RX ORDER — INSULIN GLARGINE 100 [IU]/ML
20 INJECTION, SOLUTION SUBCUTANEOUS DAILY
Status: DISCONTINUED | OUTPATIENT
Start: 2022-10-20 | End: 2022-10-20

## 2022-10-20 RX ORDER — ONDANSETRON 2 MG/ML
4 INJECTION INTRAMUSCULAR; INTRAVENOUS EVERY 6 HOURS PRN
Status: DISCONTINUED | OUTPATIENT
Start: 2022-10-20 | End: 2022-10-24 | Stop reason: HOSPADM

## 2022-10-20 RX ORDER — ONDANSETRON 2 MG/ML
INJECTION INTRAMUSCULAR; INTRAVENOUS
Status: COMPLETED
Start: 2022-10-20 | End: 2022-10-20

## 2022-10-20 RX ORDER — INSULIN LISPRO 100 [IU]/ML
0-4 INJECTION, SOLUTION INTRAVENOUS; SUBCUTANEOUS NIGHTLY
Status: DISCONTINUED | OUTPATIENT
Start: 2022-10-20 | End: 2022-10-24 | Stop reason: HOSPADM

## 2022-10-20 RX ORDER — INSULIN GLARGINE 100 [IU]/ML
20 INJECTION, SOLUTION SUBCUTANEOUS EVERY MORNING
Status: DISCONTINUED | OUTPATIENT
Start: 2022-10-21 | End: 2022-10-21

## 2022-10-20 RX ORDER — INSULIN LISPRO 100 [IU]/ML
5 INJECTION, SOLUTION INTRAVENOUS; SUBCUTANEOUS
Status: DISCONTINUED | OUTPATIENT
Start: 2022-10-20 | End: 2022-10-21

## 2022-10-20 RX ADMIN — HEPARIN SODIUM 5000 UNITS: 10000 INJECTION INTRAVENOUS; SUBCUTANEOUS at 07:08

## 2022-10-20 RX ADMIN — DEXTROSE AND SODIUM CHLORIDE: 5; 450 INJECTION, SOLUTION INTRAVENOUS at 09:16

## 2022-10-20 RX ADMIN — ONDANSETRON 4 MG: 2 INJECTION INTRAMUSCULAR; INTRAVENOUS at 11:10

## 2022-10-20 RX ADMIN — POTASSIUM CHLORIDE 10 MEQ: 7.46 INJECTION, SOLUTION INTRAVENOUS at 02:51

## 2022-10-20 RX ADMIN — POTASSIUM CHLORIDE 10 MEQ: 7.46 INJECTION, SOLUTION INTRAVENOUS at 00:36

## 2022-10-20 RX ADMIN — INSULIN LISPRO 8 UNITS: 100 INJECTION, SOLUTION INTRAVENOUS; SUBCUTANEOUS at 17:09

## 2022-10-20 RX ADMIN — DEXTROSE AND SODIUM CHLORIDE 150 ML/HR: 5; 450 INJECTION, SOLUTION INTRAVENOUS at 05:57

## 2022-10-20 RX ADMIN — HEPARIN SODIUM 5000 UNITS: 10000 INJECTION INTRAVENOUS; SUBCUTANEOUS at 17:09

## 2022-10-20 RX ADMIN — POTASSIUM CHLORIDE 10 MEQ: 7.46 INJECTION, SOLUTION INTRAVENOUS at 10:40

## 2022-10-20 RX ADMIN — POTASSIUM CHLORIDE 10 MEQ: 7.46 INJECTION, SOLUTION INTRAVENOUS at 05:52

## 2022-10-20 RX ADMIN — INSULIN LISPRO 5 UNITS: 100 INJECTION, SOLUTION INTRAVENOUS; SUBCUTANEOUS at 17:10

## 2022-10-20 RX ADMIN — POTASSIUM CHLORIDE 10 MEQ: 7.46 INJECTION, SOLUTION INTRAVENOUS at 09:13

## 2022-10-20 RX ADMIN — POTASSIUM CHLORIDE 10 MEQ: 7.46 INJECTION, SOLUTION INTRAVENOUS at 04:28

## 2022-10-20 RX ADMIN — POTASSIUM CHLORIDE 10 MEQ: 7.46 INJECTION, SOLUTION INTRAVENOUS at 01:25

## 2022-10-20 RX ADMIN — POTASSIUM CHLORIDE 10 MEQ: 7.46 INJECTION, SOLUTION INTRAVENOUS at 08:05

## 2022-10-20 RX ADMIN — POTASSIUM CHLORIDE 10 MEQ: 7.46 INJECTION, SOLUTION INTRAVENOUS at 07:08

## 2022-10-20 RX ADMIN — SODIUM CHLORIDE: 9 INJECTION, SOLUTION INTRAVENOUS at 04:26

## 2022-10-20 RX ADMIN — INSULIN GLARGINE 20 UNITS: 100 INJECTION, SOLUTION SUBCUTANEOUS at 10:30

## 2022-10-20 RX ADMIN — SODIUM PHOSPHATE, MONOBASIC, MONOHYDRATE 10 MMOL: 276; 142 INJECTION, SOLUTION INTRAVENOUS at 04:57

## 2022-10-20 ASSESSMENT — PAIN SCALES - GENERAL
PAINLEVEL_OUTOF10: 0

## 2022-10-20 NOTE — PROGRESS NOTES
Reason for consult: New DM, DKA    A1C: 11.3%     [] Not available                     Patient states the following concerns/barriers to diabetes self-management:     [x] None       [] Medication cost   [] Food cost/availability        [] Reading  [] Hearing   [] Vision                [] Work    [] Transportation  [] No insurance  [] Physical limitations    [] Other:        Patient states the following about their diabetes/health:   [x] He has no known h/o diabetes    [x] Prior to admission, he was experiencing polyuria, polydipsia, blurred vision, dizziness, trouble walking d/t weakness  [x] He was incontinent of urine at home, which is not usual   [x] He was drinking large quantities of juices and milk to \"help the dehydration. \"  He was also eating ice cream. This is not typical of his eating habits. [x] He attempted to eat dinner at home, including a protein, greens and a starch but could not hold down any solid food. He states that he does try to eat balanced meals. Diabetes survival packet provided to:   [x] Patient     [] Other:    Information reviewed:   Definition of diabetes   Target glucose ranges/A1C   Self-monitoring of blood glucose   Prevention/symptoms/treatment of hypo-/hyperglycemia   Medication adherence   The plate method/meal planning guidelines   The benefits of exercise and recommendations   Reducing the risk of chronic complications      Diabetes medications reviewed (use, purpose, action):  Education provided regarding current basal/bolus regimen of Lantus and Humalog including differences in types of insulin, timing with meals, onset, duration of effect and peak of insulin dose. Hypoglycemia signs, symptoms and treatments reviewed and literature provided. Patient instructed on the preparation and injection of insulin dose via pen method.   Patient verbalized understanding of site rotation, storage and proper sharps disposal. Instructed patient to always seek guidance from their PCP for any adjustment. Floor nursing should continue to have patient practice insulin self-injection when insulin dose is due and document in the progress notes. His vision is still blurry at the moment, so he will need continued reinforcement of education in the future when vision improves. Patient would prefer insulin PENS at home. Post-education Assessment  [x]  Attentive to teaching  [x]  Answered questions appropriately when asked   [x]  Seems able to apply concepts to daily lifestyle  [x]  Seems motivated to do well  [x]  Verbalized an understanding of the plate method for portion control   [x]  Verbalized an understanding of prescribed antidiabetes medications   [x]  Verbalized an understanding of target glucose ranges/A1C level  [x]  Expresses an intent to comply with treatment plan   []  Showed very little interest in complying with treatment plan   []  Seems to have trouble applying concepts to daily lifestyle       COMMENTS:  Patient pleasant and receptive to education. He states that his vision is quite blurry, his mouth is very dry, he feels dehydrated and weak, and is still urinating frequently in small amounts. Patient is aware that these symptoms will improve with good glucose control. He states that he lives alone. He is agreeable to starting on insulin at home, but I would recommend a long insulin combined with orals to make his regimen easier to follow--per patient history, he does have a slight developmental delay. He voices a basic understanding of using the plate method for portion control. He is interested in attending outpatient diabetes education classes. He is aware Dr. Maria Elena Ramirez will see him in the hospital to manage his diabetes medication regimen. Encouraged him to call us with any questions.                Recommendations:   [x] Carbohydrate-controlled diet    [x] Script for glucometer and supplies (per preference of patient's insurance)  [x] Script for insulin pens and pen needles (if insulin is ordered at discharge for home use)   [] Consult to social work: patient has no insurance or has financial hardship  [x] Inpatient consult to endocrinologist   [x] Follow up with endocrinologist as an outpatient   [] Home healthcare nursing to increase compliance to treatment plan   [x] Script for outpatient diabetes education classes (from doctor)        Thank you for this consult.     Lebron Mishra, RN, BSN, [facility]

## 2022-10-20 NOTE — CONSULTS
Critical Care Admit/Consult Note     Patient - Canelo Garcia   MRN -  [id number]   Acct # - [de-identified]   - 1985      Date of Admission -  10/19/2022  6:17 PM  Date of evaluation -  10/19/2022  10/10   Hospital Day - 0    ADMIT/CONSULT DETAILS     Reason for Admit/Consult   DKA    Consulting Service/Physician   Consulting - Corinne Aquas, DO  Primary Care Physician - Tripp Marie MD         HPI   Mr. Nohemy Erazo presented to the ED today with weeks of polyuria with associated incontinence and two days of headache, dizziness, nausea, vomiting with associated chest and abdominal pain. His frequent urination is his most distressing symptom and what caused him to seek treatment. In the ED he was found to have multiple electrolyte abnormalities, SMITHA (BUN/Cr 41/1.5), AGMA with blood glucose 999, AG 32, bicarb 17, lactic acid 2.7, beta hydroxy > 4.5, and UA with glycosuria and ketonuria. CT Head WO and CT A/P W IV contrast were negative for acute findings. He was started on an insulin infusion and received 3L IVF in the ED. He is admitted to the ICU in DKA. Past Medical History         Diagnosis Date    Cerumen impaction     Hypertension     Seizure ([address] 2018    Questionable seizure, occurred  with syncopal episode. No AED and no recurrence since. Past Surgical History           Procedure Laterality Date    OTHER SURGICAL HISTORY Bilateral 2018    ear wax abstraction     Current Medications   Current Medications    sodium chloride  1,000 mL IntraVENous Once    sodium chloride  15 mL/kg IntraVENous Once     dextrose bolus **OR** dextrose bolus, potassium chloride, magnesium sulfate, sodium phosphate IVPB **OR** sodium phosphate IVPB **OR** sodium phosphate IVPB, dextrose 5 % and 0.45 % NaCl, sodium chloride flush  IV Drips/Infusions   dextrose 5 % and 0.45 % NaCl      sodium chloride      insulin       Diet/Nutrition   Diet NPO    Allergies   Patient has no known allergies.     Social History   Tobacco   reports that he has never smoked. He has never used smokeless tobacco.    Alcohol     reports no history of alcohol use. Family History         Problem Relation Age of Onset    No Known Problems Mother     No Known Problems Father      Lines and Devices    PIV    Mechanical Ventilation Data   VENT SETTINGS (Comprehensive)     Additional Respiratory Assessments  Heart Rate: (!) 109  Resp: 16  SpO2: 98 %    Vitals    height is 5' 3\" (1.6 m) and weight is 175 lb (79.4 kg). His oral temperature is 98 °F (36.7 °C). His blood pressure is 131/98 (abnormal) and his pulse is 109 (abnormal). His respiration is 16 and oxygen saturation is 98%.        Temperature Range: Temp: 98 °F (36.7 °C) Temp  Av °F (36.7 °C)  Min: 97.9 °F (36.6 °C)  Max: 98 °F (36.7 °C)  BP Range:  Systolic (65RUZ), TDV:239 , Min:131 , NEX:225     Diastolic (77UOG), WOH:01, Min:90, Max:98    Pulse Range: Pulse  Av.3  Min: 98  Max: 118  Respiration Range: Resp  Av.7  Min: 16  Max: 24  Current Pulse Ox[de-identified]  SpO2: 98 %  24HR Pulse Ox Range:  SpO2  Av.7 %  Min: 95 %  Max: 98 %  Oxygen Amount and Delivery: O2 Flow Rate (L/min): 2 L/min      I/O (24 Hours)    Patient Vitals for the past 8 hrs:   BP Temp Temp src Pulse Resp SpO2 Height Weight   10/19/22 2015 (!) 131/98 -- -- (!) 109 16 98 % -- --   10/19/22 1902 (!) 148/90 98 °F (36.7 °C) Oral 98 24 95 % -- --   10/19/22 1814 (!) 151/94 97.9 °F (36.6 °C) -- (!) 118 16 97 % 5' 3\" (1.6 m) 175 lb (79.4 kg)       Patient Vitals for the past 96 hrs (Last 3 readings):   Weight   10/19/22 1814 175 lb (79.4 kg)     Exam   PHYSICAL EXAM:  Vitals:  BP (!) 131/98   Pulse (!) 109   Temp 98 °F (36.7 °C) (Oral)   Resp 16   Ht 5' 3\" (1.6 m)   Wt 175 lb (79.4 kg)   SpO2 98%   BMI 31.00 kg/m²     General Appearance: alert and oriented to person, place and time and in no acute distress, hard of hearing  Skin: warm and dry  Head: normocephalic and atraumatic  Eyes: pupils equal, round, and reactive to light, extraocular eye movements intact, conjunctivae normal  Neck: neck supple and non tender without mass   Pulmonary/Chest: clear to auscultation bilaterally- no wheezes, rales or rhonchi, normal air movement, no respiratory distress  Cardiovascular: normal rate, normal S1 and S2 and no carotid bruits  Abdomen: soft, generalized tenderness to deep palpation, non-distended, normal bowel sounds, no masses or organomegaly, no rebound rigidity or guarding  Extremities: no cyanosis, no clubbing and no edema  Neurologic: sister and mother at bedside and report patient is at neurologic baseline.  no cranial nerve deficit and speech normal    Data   Old records and images have been reviewed    Lab Results   CBC     Lab Results   Component Value Date/Time    WBC 12.7 10/19/2022 06:44 PM    RBC 6.41 10/19/2022 06:44 PM    HGB 18.8 10/19/2022 06:44 PM    HCT 53.8 10/19/2022 06:44 PM     10/19/2022 06:44 PM    MCV 83.9 10/19/2022 06:44 PM    MCH 29.3 10/19/2022 06:44 PM    MCHC 34.9 10/19/2022 06:44 PM    RDW 11.5 10/19/2022 06:44 PM    LYMPHOPCT 17.4 10/19/2022 06:44 PM    MONOPCT 6.1 10/19/2022 06:44 PM    BASOPCT 0.3 10/19/2022 06:44 PM    MONOSABS 0.77 10/19/2022 06:44 PM    LYMPHSABS 2.20 10/19/2022 06:44 PM    EOSABS 0.00 10/19/2022 06:44 PM    BASOSABS 0.04 10/19/2022 06:44 PM       BMP   Lab Results   Component Value Date/Time     10/19/2022 06:44 PM    K 5.8 10/19/2022 06:44 PM    CL 72 10/19/2022 06:44 PM    CO2 17 10/19/2022 06:44 PM    BUN 41 10/19/2022 06:44 PM    CREATININE 1.5 10/19/2022 06:44 PM    GLUCOSE 999 10/19/2022 06:49 PM    CALCIUM 13.7 10/19/2022 06:44 PM       LFTS  Lab Results   Component Value Date/Time    ALKPHOS 221 10/19/2022 06:44 PM    ALT 30 10/19/2022 06:44 PM    AST 20 10/19/2022 06:44 PM    PROT 10.0 10/19/2022 06:44 PM    BILITOT 0.7 10/19/2022 06:44 PM    LABALBU 5.0 10/19/2022 06:44 PM     Lactic Acid  Lab Results   Component Value Date/Time    LACTA 0.9 11/25/2019 11:05 PM    LACTA 1.7 10/10/2016 08:39 PM        Radiology   CXR  Impression:  XR CHEST PORTABLE 10/19/22 1918     No evidence of pneumonia or pleural effusion. CT Scans  Impression:  CT ABDOMEN PELVIS W IV CONTRAST  10/19/22 2104     1. No acute process in abdomen or pelvis. 2. Diverticulosis. Impression:  CT Head WO Contrast 10/19/22 2054     No acute intracranial abnormality. SYSTEMS ASSESSMENT    Neuro   # CT head negative for acute findings    Respiratory   # Wean oxygen as tolerated.  Keep O2 sat 90-92%    Cardiovascular   # Trop mildly elevated - trend to peak  # EKG without signs of ACS    Gastrointestinal   # NPO    # Abdominal pain with n/v for 2 days  - ?? 2/2 hypercalcemia vs DKA  - CT A/P without acute findings, diverticulosis seen  - Check lipase, ggt     Renal   # SMITHA  - Likely pre-renal from dehydration in DKA  - Volume resuscitate  - Check urine indices  - Monitor I&Os    # Electrolyte abnormalities  - Hyponatremia - likely pseudo in setting of DKA, corrected 143  - Hyperkalemia - insulin started, trend lytes  - Hypercalcemia - in setting of dka/hypovolemia, continue volume resuscitation, check ionized, pth/thyroid studies, vitamin a/d, ck total, cortisol, Consider nephro consult with no improvement    # AGMA  - AG 32, bicarb 17, pH 7.31  - Volume resuscitate  - Trend BMP, lactate  - No indication for bicarb at this time     Infectious Disease   # Await cultures  # No indication for abx at this time    Hematology/Oncology   # Polycythemia likely from hemoconcentration  - Trend CBC    Endocrine   # DKA  - Previously undiagnosed diabetic  - Blood glucose 999, AG 32, bicarb 17, lactic acid 2.7, beta hydroxy > 4.5, and UA with glycosuria and ketonuria  - Infectious work up - cultures pending, check procal, crp  - Insulin infusion started in the ED - continue  - Check a1c, lipase, UDS  - Consult Endocrine    Social/Spiritual/DNR/Other   Code: Full    \"Thank you for asking us to see this complex patient. \"    Total critical care time caring for this patient with life threatening, unstable organ failure, including direct patient contact, management of life support systems, review of data including imaging and labs, discussions with other team members and physicians at least 61  Min so far today, excluding procedures. Please feel free to call with questions or concerns.

## 2022-10-20 NOTE — PROGRESS NOTES
Patient cleaned from incontinence of urine. Bed change and patient gown changed. Male external catheter placed in good position. Explained to patient and he voiced understanding.

## 2022-10-20 NOTE — PROGRESS NOTES
Patient on floors. Critical Care to sign off. Please let us know if there are any further questions. Thank you.     Juanita Pa MD  10/20/2022  3:22 PM

## 2022-10-20 NOTE — ED NOTES
Pt alert times 3, is mentally disabled, but lives alone, lungs are clear a and p, abdomen is softly distended with hypo bowel sounds times 4, he is incontient of urine, family at the bedside.       Nehemias Avila RN  10/19/22 4777

## 2022-10-20 NOTE — PLAN OF CARE
Problem: Discharge Planning  Goal: Discharge to home or other facility with appropriate resources  10/20/2022 1057 by Brenden Ward RN  Outcome: Progressing  10/20/2022 0113 by Ashley Manzo RN  Outcome: Progressing  Flowsheets  Taken 10/19/2022 2357  Discharge to home or other facility with appropriate resources: Identify barriers to discharge with patient and caregiver  Taken 10/19/2022 2225  Discharge to home or other facility with appropriate resources: Identify barriers to discharge with patient and caregiver     Problem: Skin/Tissue Integrity  Goal: Absence of new skin breakdown  Description: 1. Monitor for areas of redness and/or skin breakdown  2. Assess vascular access sites hourly  3. Every 4-6 hours minimum:  Change oxygen saturation probe site  4. Every 4-6 hours:  If on nasal continuous positive airway pressure, respiratory therapy assess nares and determine need for appliance change or resting period.   10/20/2022 1057 by Brenden Ward RN  Outcome: Progressing  10/20/2022 0113 by Ashley Manzo RN  Outcome: Progressing     Problem: Safety - Adult  Goal: Free from fall injury  10/20/2022 1057 by Brenden Ward RN  Outcome: Progressing  Flowsheets (Taken 10/20/2022 1000)  Free From Fall Injury: Instruct family/caregiver on patient safety  10/20/2022 0113 by Ashley Manzo RN  Outcome: Progressing     Problem: Pain  Goal: Verbalizes/displays adequate comfort level or baseline comfort level  10/20/2022 1057 by Brenden Ward RN  Outcome: Progressing  Flowsheets (Taken 10/20/2022 0800)  Verbalizes/displays adequate comfort level or baseline comfort level: Assess pain using appropriate pain scale  10/20/2022 0113 by Ashley Manzo RN  Outcome: Progressing

## 2022-10-20 NOTE — PROGRESS NOTES
Critical Care Admit/Consult Note     Patient - Chetan Merchant   MRN -  [id number]   Acct # - [de-identified]   - 1985      Date of Admission -  10/19/2022  6:17 PM  Date of evaluation -  10/19/2022  10/10   Hospital Day - 0     ADMIT/CONSULT DETAILS      Reason for Admit/Consult   DKA     Consulting Service/Physician   Consulting - Leti Hoffman DO  Primary Care Physician - Cyndy Rankin MD           HPI   Mr. West Valdez presented to the ED today with weeks of polyuria with associated incontinence and two days of headache, dizziness, nausea, vomiting with associated chest and abdominal pain. His frequent urination is his most distressing symptom and what caused him to seek treatment. In the ED he was found to have multiple electrolyte abnormalities, SMITHA (BUN/Cr 41/1.5), AGMA with blood glucose 999, AG 32, bicarb 17, lactic acid 2.7, beta hydroxy > 4.5, and UA with glycosuria and ketonuria. CT Head WO and CT A/P W IV contrast were negative for acute findings. He was started on an insulin infusion and received 3L IVF in the ED. He is admitted to the ICU in DKA. Past Medical History      Past Medical History             Diagnosis Date    Cerumen impaction      Hypertension      Seizure ([address] 2018     Questionable seizure, occurred  with syncopal episode. No AED and no recurrence since.                        Past Surgical History        Past Surgical History             Procedure Laterality Date    OTHER SURGICAL HISTORY Bilateral 2018     ear wax abstraction         Current Medications   Current Medications   Scheduled Medications    sodium chloride  1,000 mL IntraVENous Once    sodium chloride  15 mL/kg IntraVENous Once         PRN Medications   dextrose bolus **OR** dextrose bolus, potassium chloride, magnesium sulfate, sodium phosphate IVPB **OR** sodium phosphate IVPB **OR** sodium phosphate IVPB, dextrose 5 % and 0.45 % NaCl, sodium chloride flush     IV Drips/Infusions  Infusions Meds    dextrose 5 % and 0.45 % NaCl      sodium chloride      insulin           Diet/Nutrition   Diet NPO     Allergies   Patient has no known allergies. Social History   Tobacco   reports that he has never smoked. He has never used smokeless tobacco.     Alcohol     reports no history of alcohol use. Family History      Family History             Problem Relation Age of Onset    No Known Problems Mother      No Known Problems Father           Lines and Devices    PIV     Mechanical Ventilation Data   VENT SETTINGS (Comprehensive)  Additional Respiratory Assessments  Heart Rate: (!) 109  Resp: 16  SpO2: 98 %     Vitals    height is 5' 3\" (1.6 m) and weight is 175 lb (79.4 kg). His oral temperature is 98 °F (36.7 °C). His blood pressure is 131/98 (abnormal) and his pulse is 109 (abnormal). His respiration is 16 and oxygen saturation is 98%.         Temperature Range: Temp: 98 °F (36.7 °C) Temp  Av °F (36.7 °C)  Min: 97.9 °F (36.6 °C)  Max: 98 °F (36.7 °C)  BP Range:  Systolic (17KZN), CYY:815 , Min:131 , DQO:465     Diastolic (83WJS), ZTM:98, Min:90, Max:98     Pulse Range: Pulse  Av.3  Min: 98  Max: 118  Respiration Range: Resp  Av.7  Min: 16  Max: 24  Current Pulse Ox[de-identified]  SpO2: 98 %  24HR Pulse Ox Range:  SpO2  Av.7 %  Min: 95 %  Max: 98 %  Oxygen Amount and Delivery: O2 Flow Rate (L/min): 2 L/min        I/O (24 Hours)     Patient Vitals for the past 8 hrs:    BP Temp Temp src Pulse Resp SpO2 Height Weight   10/19/22 2015 (!) 131/98 -- -- (!) 109 16 98 % -- --   10/19/22 1902 (!) 148/90 98 °F (36.7 °C) Oral 98 24 95 % -- --   10/19/22 1814 (!) 151/94 97.9 °F (36.6 °C) -- (!) 118 16 97 % 5' 3\" (1.6 m) 175 lb (79.4 kg)         Patient Vitals for the past 96 hrs (Last 3 readings):    Weight   10/19/22 1814 175 lb (79.4 kg)      Exam   PHYSICAL EXAM:  Vitals:  BP (!) 131/98   Pulse (!) 109   Temp 98 °F (36.7 °C) (Oral)   Resp 16   Ht 5' 3\" (1.6 m)   Wt 175 lb (79.4 kg)   SpO2 98%   BMI 31.00 kg/m²      General Appearance: alert and oriented to person, place and time and in no acute distress, hard of hearing  Skin: warm and dry  Head: normocephalic and atraumatic  Eyes: pupils equal, round, and reactive to light, extraocular eye movements intact, conjunctivae normal  Neck: neck supple and non tender without mass   Pulmonary/Chest: clear to auscultation bilaterally- no wheezes, rales or rhonchi, normal air movement, no respiratory distress  Cardiovascular: normal rate, normal S1 and S2 and no carotid bruits  Abdomen: soft, generalized tenderness to deep palpation, non-distended, normal bowel sounds, no masses or organomegaly, no rebound rigidity or guarding  Extremities: no cyanosis, no clubbing and no edema  Neurologic: sister and mother at bedside and report patient is at neurologic baseline.  no cranial nerve deficit and speech normal     Data   Old records and images have been reviewed     Lab Results   CBC           Lab Results   Component Value Date/Time     WBC 12.7 10/19/2022 06:44 PM     RBC 6.41 10/19/2022 06:44 PM     HGB 18.8 10/19/2022 06:44 PM     HCT 53.8 10/19/2022 06:44 PM      10/19/2022 06:44 PM     MCV 83.9 10/19/2022 06:44 PM     MCH 29.3 10/19/2022 06:44 PM     MCHC 34.9 10/19/2022 06:44 PM     RDW 11.5 10/19/2022 06:44 PM     LYMPHOPCT 17.4 10/19/2022 06:44 PM     MONOPCT 6.1 10/19/2022 06:44 PM     BASOPCT 0.3 10/19/2022 06:44 PM     MONOSABS 0.77 10/19/2022 06:44 PM     LYMPHSABS 2.20 10/19/2022 06:44 PM     EOSABS 0.00 10/19/2022 06:44 PM     BASOSABS 0.04 10/19/2022 06:44 PM         BMP         Lab Results   Component Value Date/Time      10/19/2022 06:44 PM     K 5.8 10/19/2022 06:44 PM     CL 72 10/19/2022 06:44 PM     CO2 17 10/19/2022 06:44 PM     BUN 41 10/19/2022 06:44 PM     CREATININE 1.5 10/19/2022 06:44 PM     GLUCOSE 999 10/19/2022 06:49 PM     CALCIUM 13.7 10/19/2022 06:44 PM         LFTS        Lab Results   Component Value Date/Time     ALKPHOS 221 10/19/2022 06:44 PM     ALT 30 10/19/2022 06:44 PM     AST 20 10/19/2022 06:44 PM     PROT 10.0 10/19/2022 06:44 PM     BILITOT 0.7 10/19/2022 06:44 PM     LABALBU 5.0 10/19/2022 06:44 PM      Lactic Acid        Lab Results   Component Value Date/Time     LACTA 0.9 11/25/2019 11:05 PM     LACTA 1.7 10/10/2016 08:39 PM         Radiology   CXR       Impression:  XR CHEST PORTABLE 10/19/22 1918     No evidence of pneumonia or pleural effusion. CT Scans       Impression:  CT ABDOMEN PELVIS W IV CONTRAST  10/19/22 2104     1. No acute process in abdomen or pelvis. 2. Diverticulosis. Impression:  CT Head WO Contrast 10/19/22 2054     No acute intracranial abnormality. SYSTEMS ASSESSMENT     Neuro   # CT head negative for acute findings     Respiratory   # Wean oxygen as tolerated.  Keep O2 sat 90-92%     Cardiovascular   # Trop mildly elevated - trend to peak  # EKG without signs of ACS     Gastrointestinal   # NPO     # Abdominal pain with n/v for 2 days  - ?? 2/2 hypercalcemia vs DKA  - CT A/P without acute findings, diverticulosis seen  - Check lipase, ggt      Renal   # SMITHA  - Likely pre-renal from dehydration in DKA  - Volume resuscitate  - Check urine indices  - Monitor I&Os     # Electrolyte abnormalities  - Hyponatremia - likely pseudo in setting of DKA, corrected 143  - Hyperkalemia - insulin started, trend lytes  - Hypercalcemia - in setting of dka/hypovolemia, continue volume resuscitation, check ionized, pth/thyroid studies, vitamin a/d, ck total, cortisol, Consider nephro consult with no improvement     # AGMA  - AG 32, bicarb 17, pH 7.31  - Volume resuscitate  - Trend BMP, lactate  - No indication for bicarb at this time     Infectious Disease   # Await cultures  # No indication for abx at this time     Hematology/Oncology   # Polycythemia likely from hemoconcentration  - Trend CBC     Endocrine   # DKA  - Previously undiagnosed diabetic  - Blood glucose 999, AG 32, bicarb 17, lactic acid 2.7, beta hydroxy > 4.5, and UA with glycosuria and ketonuria  - Infectious work up - cultures pending, check procal, crp  - Insulin infusion started in the ED - continue  - Check a1c, lipase, UDS  - Consult Endocrine     Social/Spiritual/DNR/Other   Code: Full    Attending Physician Attestation: Dr. Jerson Eldridge    Thank you very much for allowing me to see this patient in consultation and follow up. I personally saw, examined and provided care for the patient. Radiographs, labs and medication list were reviewed by me independently. I spoke with bedside nursing, respiratory therapists and consultants. Critical care services and times documented are independent of procedures and multidisciplinary rounds with Residents. Additionally comprehensive, multidisciplinary rounds were conducted with the MICU team. The case was discussed in detail and plans for care were established. Review of Residents documentation was conducted and revisions were made as appropriate. I agree with the the above documented information.      Current Facility-Administered Medications   Medication Dose Route Frequency Provider Last Rate Last Admin    insulin glargine (LANTUS) injection vial 20 Units  20 Units SubCUTAneous Daily Silverio Vincent MD   20 Units at 10/20/22 1030    insulin lispro (HUMALOG) injection vial 0-8 Units  0-8 Units SubCUTAneous TID  Silverio Vincent MD        insulin lispro (HUMALOG) injection vial 0-4 Units  0-4 Units SubCUTAneous Nightly Silverio Vincent MD        glucose chewable tablet 16 g  4 tablet Oral PRN Claritza Gan MD        glucagon (rDNA) injection 1 mg  1 mg SubCUTAneous PRN Claritza Gan MD        ondansetron [facility]) injection 4 mg  4 mg IntraVENous Q6H PRN Claritza Gan MD   4 mg at 10/20/22 1110    potassium chloride 10 mEq/100 mL IVPB (Peripheral Line)  10 mEq IntraVENous PRN Carie Cifuentes  mL/hr at 10/20/22 1040 10 mEq at 10/20/22 1040    magnesium sulfate 1000 mg in dextrose 5% 100 mL IVPB  1,000 mg IntraVENous PRN Ammatt Cifuentes, DO        sodium phosphate 10 mmol in sodium chloride 0.9 % 250 mL IVPB  10 mmol IntraVENous PRN Ammatt Cifuentes DO   Stopped at 10/20/22 0900    Or    sodium phosphate 15 mmol in dextrose 5 % 250 mL IVPB  15 mmol IntraVENous PRN Carie Cifuentes DO        Or    sodium phosphate 20 mmol in dextrose 5 % 500 mL IVPB  20 mmol IntraVENous PRN Carie Cifuentes, DO        insulin regular (HUMULIN R;NOVOLIN R) 100 Units in sodium chloride 0.9 % 100 mL infusion  0.01-0.5 Units/kg/hr IntraVENous Continuous Carie Cifuentes DO   Stopped at 10/20/22 1105    sodium chloride flush 0.9 % injection 10 mL  10 mL IntraVENous PRN Breezy Perrin MD   10 mL at 10/19/22 2045    dextrose bolus 10% 125 mL  125 mL IntraVENous PRN Shiv Hoguet, APRN - CNP        Or    dextrose bolus 10% 250 mL  250 mL IntraVENous PRN Shiv Hoguet, APRN - CNP        polyethylene glycol (GLYCOLAX) packet 17 g  17 g Oral Daily PRN Shiv Hoguet, APRN - CNP        dextrose 5 % and 0.45 % sodium chloride infusion   IntraVENous Continuous PRN Shiv Hoguet, APRN - CNP   Stopped at 10/20/22 1126    heparin (porcine) injection 5,000 Units  5,000 Units SubCUTAneous Q8H Shiv Hoguet, APRN - CNP   5,000 Units at 10/20/22 0708    medicated lip balm (BLISTEX/CARMEX) stick   Topical PRN Shiv Hoguet, APRN - CNP            CT Head WO Contrast   Final Result   No acute intracranial abnormality.          CT ABDOMEN PELVIS W IV CONTRAST Additional Contrast? None   Final Result   1. No acute process in abdomen or pelvis. 2. Diverticulosis. XR CHEST PORTABLE   Final Result   No evidence of pneumonia or pleural effusion. Physical Examination:  Vitals:   Vitals:    10/20/22 0700 10/20/22 0800 10/20/22 0900 10/20/22 1000   BP: 115/73 108/71 133/88 123/76   Pulse: 91 88 91 90   Resp: 15 18 17 17   Temp:  97.9 °F (36.6 °C)     TempSrc:  Oral     SpO2:  97% 99% 99%   Weight:       Height:          General: No Acute Distress  HEENT: normocephalic, atraumatic  Abdomen: soft, NT, ND  CVS: RRR, S1, S2, No S3 or S4  Respiratory: decreased breath sounds at lung bases, no focal wheezes noted  Extremities: no clubbing/cyanosis/edema  Neuro: intact    ASSESSMENT:  1.) Diabetic Ketoacidosis  2.) Anion Gap Metabolic Acidosis  3.) Polycythemia, Volume Depletion   4.) Acute  Kidney Injury, Resolved  5.) Hypophosphatemia   6.) Mild Troponin Elevation   7.) H/O Learning Disability     In addition the following applies:    Check: serial labs  Medication Alterations: DKA protocol, look to transition to 20 lantus with ISS  Procedures: N/A  Imaging: reviewed  New Consultations: Endocrinology, Diabetic Education, Outpatient Ophthalmology   VENT: N/A    Access: Peripheral   Consults: Endocrinology   Drips: Insulin   DVT Phylaxis: Heparin   GI Prophylaxis: N/A  Nutrition: PO  ABX: N/A    - patient to be transitioned off Insulin Drip per DKA protocol to Lantus 20 units with ISS  - Endocrinology consultation   - Diabetic Education  - Outpatient ophthalmology consultation for patient     - when patient transitions out of ICU level of care to floors critical care to sign off    Thank you for allowing me to participate in the care of this patient. Care reviewed with nursing staff, medical and surgical specialty care, primary care and the patient's family as available. Restraints are ordered when the patient can do harm to him/herself by pulling out devices.     Critical Care Time: 35 minutes excluding procedures    Raheem Thomas M.D.     Raheem Thomas MD  10/20/2022  12:03 PM

## 2022-10-20 NOTE — SIGNIFICANT EVENT
Patient was admitted for new onset DKA with BS >900. He had been with abdominal pain, nausea, vomiting. Admit HgbA1C 11.3%. His last blood sugars show closed Anion Gap. Still requiring insulin drip - latest blood sugar 236. Some cognitive impairment noted. CT head negative for hemorrhage or ischemic changes. CT Abd/Pelvis revealed diverticulosis but nothing else of significance. Last phosphorus 2.3, receiving replacement. K+ 4.1; Mg2+. Continue current regimen. Transition to SQ insulin. Will need Diabetic education. Check microalbumin; start ACEi if appropriate. Transfer to floor when appropriate. Up to date with COVID (received 2 doses), last Flu in 2021, Tdap up to date. Hx of HTN on Amlodipine.   Trend BP and treat if SBP > 130.    Shawna Engel MD  Hospitalist, #[id number]

## 2022-10-20 NOTE — PATIENT CARE CONFERENCE
Intensive Care Daily Quality Rounding Checklist      ICU Team Members: Dr. Asiya Bowles, charge nurse, bedside nurse, clinical pharmacist, respiratory therapist    ICU Day #: NUMBER: 2    Intubation Date: N/A    Ventilator Day #: N/A    Central Line Insertion Date: N/A        Day #: N/A     Arterial Line Insertion Date: N/A      Day #: N/A    Temporary Hemodialysis Catheter Insertion Date: N/A      Day # N/A    DVT Prophylaxis: Heparin gtt    GI Prophylaxis: start PO diet    Shell Catheter Insertion Date: N/A       Day #: N/A      Continued need (if yes, reason documented and discussed with physician): N/A    Skin Issues/ Wounds and ordered treatment discussed on rounds: none    Goals/ Plans for the Day: Daily labs, transition to SQ Insulin from Insulin infusion, replace electrolytes, OK to eat today, transfer to floor

## 2022-10-20 NOTE — H&P
[facility] History and Physical      CHIEF COMPLAINT: Polydipsia, polyuria, nausea/vomiting    History of Present Illness: 69-year-old male with history of hypertension, hyperlipidemia, no prior history of diabetes mellitus, who presents with a history of relation of symptoms including abdominal pain, nausea/vomiting, polydipsia, polyuria, headache, blurry vision, dizziness, chest pain. History is somewhat limited by patient's confusion and altered mental status, supplemented by patient's family members at bedside. Patient reports burning Fuhs abdominal pain not worse with drinking fluids, \"very bad\" in severity, nonradiating    Patient also reports other complaints on review of systems including dysuria, runny nose/congestion/cough. No hematuria or blood in stool. Does report subjective chills. Other review of systems limited by patient's altered mental status. In the emergency department patient was found to be in DKA with new onset diabetes, and was admitted to the ICU for further management. REVIEW OF SYSTEMS:  A comprehensive review of systems was negative except for: what is in the HPI    PMH:  Past Medical History:   Diagnosis Date    Cerumen impaction     Hypertension     Seizure ([facility] [address] 9/2/2018    Questionable seizure, occurred 2018 with syncopal episode. No AED and no recurrence since. Surgical History:  Past Surgical History:   Procedure Laterality Date    OTHER SURGICAL HISTORY Bilateral 01/29/2018    ear wax abstraction       Medications Prior to Admission:    Prior to Admission medications    Medication Sig Start Date End Date Taking? Authorizing Provider   amLODIPine (NORVASC) 5 MG tablet take 1 tablet by mouth once daily 1/11/22   Cecelia Essex, MD   acetaminophen (TYLENOL) 500 MG tablet Take 1 tablet by mouth 4 times daily as needed for Pain 1/11/22   Cecelia Essex, MD   [facility]. Devices KIT BP cuff - may substitute brand for insurance coverage. 1/11/22   Fanta Collado MD   ascorbic acid (VITAMIN C) 500 MG tablet Take 1 tablet by mouth 2 times daily for 7 days 12/5/21 1/11/22  MARIA TERESA Bernal CNP       Allergies:    Patient has no known allergies. Social History:    reports that he has never smoked. He has never used smokeless tobacco. He reports that he does not drink alcohol and does not use drugs. Family History:     Paternal grandmother: Diabetes mellitus  Paternal aunt: Diabetes mellitus      PHYSICAL EXAM:  Vitals:  /79   Pulse 99   Temp 97.9 °F (36.6 °C) (Axillary)   Resp 21   Ht 5' 3\" (1.6 m)   Wt 157 lb 10.1 oz (71.5 kg)   SpO2 98%   BMI 27.92 kg/m²     General: Well developed, well nourished. No acute distress. HEENT: NCAT. PERRL, non-injected conjunctiva, non-icteric sclera. External eyelids without pus/discharge. No lesions on external ears and nose anteriorly. Dry mucous membranes, normal posterior oropharynx. Neck: Trachea midline, no gross masses visualized. No thyromegaly noted. Cardiovascular: Tachycardic, no rubs/gallops. No LE edema. Respiratory: Normal effort, CTAB, no wheezes/rhonchi/rales. Abdomen: Soft, ND/NT, bowel sounds present. HSM difficult to assess due to body habitus. Genitourinary: Deferred  Rectal: Deferred  Neurological: Grossly nonfocal.   Psychiatry: Appears somewhat somnolent and confused, repeats phrases at times. AAOx3. Skin: Warm and non-taut on palpation. No ulcers noted on visible skin. LABS:  Recent Labs     10/19/22  1844 10/19/22  1849   *  --    K 5.8*  --    CL 72*  --    CO2 17*  --    BUN 41*  --    CREATININE 1.5*  --    GLUCOSE 924* 999   CALCIUM 13.7*  --        Recent Labs     10/19/22  1844   WBC 12.7*   RBC 6.41*   HGB 18.8*   HCT 53.8   MCV 83.9   MCH 29.3   MCHC 34.9*   RDW 11.5      MPV 11.4       No results for input(s): POCGLU in the last 72 hours.         Radiology:   CT Head WO Contrast   Final Result   No acute intracranial abnormality. CT ABDOMEN PELVIS W IV CONTRAST Additional Contrast? None   Final Result   1. No acute process in abdomen or pelvis. 2. Diverticulosis. XR CHEST PORTABLE   Final Result   No evidence of pneumonia or pleural effusion. EKG (Independent Interpretation): Sinus tachycardia 109, no ST elevation, normal axis    ASSESSMENT:      Principal Problem:    DKA, type 2 (HCC)  Active Problems:    AMS (altered mental status)    Hypercalcemia    Elevated lactic acid level  Resolved Problems:    DKA, type 1, not at goal [facility])      PLAN:    Admitted for new onset diabetes in the setting of DKA. Monitor glucose checks as well as BMP while on the insulin drip. IV fluids as per glucose and sodium levels. Patient does appear to be hemoconcentrated which is likely secondary to volume depletion from DKA/polyuria. Otherwise, patient noted to be hyponatremic and hyperkalemic which is likely secondary DKA, continue to monitor electrolytes. Of note, patient is hypercalcemic, ICU team has ordered hypercalcemia work-up including ionized calcium, and we will follow up on the results. Endocrinology has been consulted, appreciate recommendations. Code Status: Full code  DVT prophylaxis: Heparin      NOTE: This report was transcribed using voice recognition software. Every effort was made to ensure accuracy; however, inadvertent computerized transcription errors may be present and meaning should be derived from surrounding context.    Electronically signed by Vera Cisneros MD on 10/19/2022 at 10:50 PM

## 2022-10-20 NOTE — PROGRESS NOTES
[facility] Progress Note    Admitting Date and Time: 10/19/2022  6:17 PM  Admit Dx: DKA, type 1, not at goal [facility]) [E10.10]    Subjective:  Patient is being followed for DKA, type 1, not at goal [facility]) [E10.10]     Pt reports burning and pain with urination same as yesterday. This is not usual for him. He has had several instances of incontinence yesterday for which he has an external catheter on now. Mom is in room with him and denies blood in urine. Patient reports low abdominal pain since yesterday as well. Patient is nauseated after eating minimal lunch. Per mom eats all kinds of food at home but only ate applesauce here today. Reports blurry vision from yesterday has improved today. Per RN: Patient likely be moved up to floors today. ROS: denies fever, chills, cp, sob, n/v, HA unless stated above.       insulin glargine  20 Units SubCUTAneous Daily    insulin lispro  0-8 Units SubCUTAneous TID WC    insulin lispro  0-4 Units SubCUTAneous Nightly    heparin (porcine)  5,000 Units SubCUTAneous Q8H     glucose, 4 tablet, PRN  glucagon (rDNA), 1 mg, PRN  ondansetron, 4 mg, Q6H PRN  potassium chloride, 10 mEq, PRN  magnesium sulfate, 1,000 mg, PRN  sodium phosphate IVPB, 10 mmol, PRN   Or  sodium phosphate IVPB, 15 mmol, PRN   Or  sodium phosphate IVPB, 20 mmol, PRN  sodium chloride flush, 10 mL, PRN  dextrose bolus, 125 mL, PRN   Or  dextrose bolus, 250 mL, PRN  polyethylene glycol, 17 g, Daily PRN  dextrose 5 % and 0.45 % NaCl, , Continuous PRN  medicated lip balm, , PRN       Objective:    /76   Pulse 90   Temp 97.9 °F (36.6 °C) (Oral)   Resp 17   Ht 5' 3\" (1.6 m)   Wt 156 lb 4.9 oz (70.9 kg)   SpO2 99%   BMI 27.69 kg/m²     General Appearance: alert and oriented to person, place and time and in no acute distress  Skin: warm and dry  Head: normocephalic and atraumatic  Eyes: extraocular eye movements intact, conjunctivae normal  Pulmonary/Chest: clear to auscultation bilaterally- no wheezes, rales or rhonchi, normal air movement, no respiratory distress  Cardiovascular: normal rate, normal S1 and S2 and no carotid bruits  Abdomen: soft, non-tender, non-distended  Extremities: no cyanosis, no clubbing and no edema  Neurologic: no cranial nerve deficit and speech normal    Has male external catheter. On 2L NC. Recent Labs     10/19/22  2322 10/20/22  0245 10/20/22  0650    133 140   K 4.0 4.2 4.1   CL 93* 99 109*   CO2 18* 21* 23   BUN 32* 28* 24*   CREATININE 1.2 1.0 0.9   GLUCOSE 457* 321* 236*   CALCIUM 10.0 9.5 8.8       Recent Labs     10/19/22  1844 10/20/22  0650   WBC 12.7* 10.5   RBC 6.41* 4.70   HGB 18.8* 13.9   HCT 53.8 39.1   MCV 83.9 83.2   MCH 29.3 29.6   MCHC 34.9* 35.5*   RDW 11.5 11.7    194   MPV 11.4 10.9       Assessment:    Principal Problem:    DKA, type 2 ([facility])  Active Problems:    AMS (altered mental status)    Hypercalcemia    Elevated lactic acid level  Resolved Problems:    DKA, type 1, not at goal [facility])    70-year-old male with history of hypertension, hyperlipidemia, no prior history of diabetes mellitus, who presented to ED with abdominal pain, nausea/vomiting, polydipsia, polyuria, headache, blurry vision, dizziness, chest pain and was admitted to ICU for DKA, day 2. Plan:    DKA  BG >900>>261. A1c on admission 11.3%.  -Critical care managing, appreciate care  -Insulin infusion  -IVF per glucose and sodium levels  -Kcl   -Endocrinology consulted  -Formerly undiagnosed, family/patient/caregiver will need diabetic education  -Check microalbumin, ACEi if indicated  -On 2 L NC, wean O2    Hypophosphatemia  2.3.  -Sodium phosphate 10 mmol prn    Hypercalcemia  Ionized Ca++ wnl, PTH low 10, vitamin D low 17, TSH low, Free T4 low.   -Check CK, cortisol  -Consider nephrology consult  -50,000 units vitamin D weekly for 8 weeks  -Possibly reassess TSH    Incontinence, dysuria  -Urine cx pending  -Continue hydration    Blurred vision  -Follow with ophthalmology outpatient     Abdominal pain  Lipase 505, triglycerides 245, alk phos 221. CT abdomen no acute findings except diverticulosis. Could be related to urinary discomfort.  -Repeat lipase tomorrow     Nausea  -Zofran Q6H prn  -Treat before meals    Hx HTN  -Formerly on amlodipine  -Trend and treat if >130 sbp    High anion gap metabolic acidosis, resolved  AG 32 on admission, bicarb 17, pH 7.31. AG today 8. SMITHA, resolved  FeNa <0.1% prerenal        DVT prophylaxis heparin gtt  Full code   Adult diet 4 carbs    Mary Connor,   Family Medicine Resident, PGY-1  Ramesh  10/20/2022 11:53 AM     NOTE: This report was transcribed using voice recognition software. Every effort was made to ensure accuracy; however, inadvertent computerized transcription errors may be present.

## 2022-10-20 NOTE — CONSULTS
ENDOCRINOLOGY INITIAL CONSULTATION NOTE      Date of admission: 10/19/2022  Date of service: 10/20/2022  Admitting physician: Eugene Kaba DO   Primary Care Physician: Marely Rabago MD  Consultant physician: Micaela Barry MD     Reason for the consultation:  Uncontrolled DM    History of Present Illness: The history is provided by the patient's sister     Jackie Regalado is a very pleasant 42 y.o. old male with PMH of HTN, hyperTG and other listed below admitted to [facility] on 10/19/2022 because of polyuria, polydipsia, urine incontinence and found to be in DKA, endocrine service was consulted for diabetes management. Admission labs , AG 32, bicarb 17, lactic acid 2.7, beta hydroxy > 4.5, and UA with glycosuria and ketonuria. CT Head WO and CT A/P W IV contrast were negative for acute findings. He was started on an insulin infusion then transitioned to sq insulin     Prior to admission  The patient denies h/o DM   Lab Results   Component Value Date/Time    LABA1C 11.2 10/19/2022 11:22 PM       Inpatient diet:   Carb Restricted diet     Point of care glucose monitoring   (Independently reviewed)   Recent Labs     10/20/22  0552 10/20/22  0635 10/20/22  0755 10/20/22  0906 10/20/22  1001 10/20/22  1101 10/20/22  1233 10/20/22  1559   GLUMET 221* 180* 261* 253* 369* 187* 250* 306*       Past medical history:   Past Medical History:   Diagnosis Date    Cerumen impaction     Hypertension     Seizure ([facility] [address] 9/2/2018    Questionable seizure, occurred 2018 with syncopal episode. No AED and no recurrence since. Past surgical history:  Past Surgical History:   Procedure Laterality Date    OTHER SURGICAL HISTORY Bilateral 01/29/2018    ear wax abstraction       Social history:   Tobacco:   reports that he has never smoked. He has never used smokeless tobacco.  Alcohol:   reports no history of alcohol use. Drugs:   reports no history of drug use.     Family history:    Family History   Problem Relation Age of Onset No Known Problems Mother     No Known Problems Father        Allergy and drug reactions:   No Known Allergies    Scheduled Meds:   insulin lispro  0-4 Units SubCUTAneous Nightly    [START ON 10/21/2022] insulin glargine  20 Units SubCUTAneous QAM    insulin lispro  5 Units SubCUTAneous TID WC    insulin lispro  0-12 Units SubCUTAneous TID WC    heparin (porcine)  5,000 Units SubCUTAneous Q8H       PRN Meds:   glucose, 4 tablet, PRN  glucagon (rDNA), 1 mg, PRN  ondansetron, 4 mg, Q6H PRN  potassium chloride, 10 mEq, PRN  magnesium sulfate, 1,000 mg, PRN  sodium phosphate IVPB, 10 mmol, PRN   Or  sodium phosphate IVPB, 15 mmol, PRN   Or  sodium phosphate IVPB, 20 mmol, PRN  sodium chloride flush, 10 mL, PRN  dextrose bolus, 125 mL, PRN   Or  dextrose bolus, 250 mL, PRN  polyethylene glycol, 17 g, Daily PRN  dextrose 5 % and 0.45 % NaCl, , Continuous PRN  medicated lip balm, , PRN      Continuous Infusions:   dextrose 5 % and 0.45 % NaCl Stopped (10/20/22 1102)       Review of Systems  All systems reviewed. All negative except for symptoms mentioned in HPI     OBJECTIVE    BP (!) 122/91   Pulse (!) 103   Temp 98.8 °F (37.1 °C) (Oral)   Resp 16   Ht 5' 3\" (1.6 m)   Wt 162 lb (73.5 kg)   SpO2 96%   BMI 28.70 kg/m²     Intake/Output Summary (Last 24 hours) at 10/20/2022 1934  Last data filed at 10/20/2022 1927  Gross per 24 hour   Intake 3528.5 ml   Output 650 ml   Net 2878.5 ml       Physical examination:  General: awake alert, oriented x3  HEENT: normocephalic non traumatic, no exophthalmos   Neck: supple, No thyroid tenderness,  Pulm: good equal air entry no added sounds  CVS: S1 + S2  Abd: soft lax, no tenderness  Skin: warm, no lesions, no rash.  No open wounds, no ulcers   Neuro: CN intact, sensation decreased bilateral , muscle power normal  Psych: normal mood, and affect    Review of Laboratory Data:  I personally reviewed the following labs:   Recent Labs     10/19/22  1844 10/20/22  0650   WBC 12.7* 10.5   RBC 6.41* 4.70   HGB 18.8* 13.9   HCT 53.8 39.1   MCV 83.9 83.2   MCH 29.3 29.6   MCHC 34.9* 35.5*   RDW 11.5 11.7    194   MPV 11.4 10.9     Recent Labs     10/19/22  1844 10/19/22  1849 10/19/22  2322 10/20/22  0245 10/20/22  0650   *  --  132 133 140   K 5.8*  --  4.0 4.2 4.1   CL 72*  --  93* 99 109*   CO2 17*  --  18* 21* 23   BUN 41*  --  32* 28* 24*   CREATININE 1.5*  --  1.2 1.0 0.9   GLUCOSE 924*   < > 457* 321* 236*   CALCIUM 13.7*  --  10.0 9.5 8.8   PROT 10.0*  --   --   --   --    LABALBU 5.0  --   --   --   --    BILITOT 0.7  --   --   --   --    ALKPHOS 221*  --   --   --   --    AST 20  --   --   --   --    ALT 30  --   --   --   --     < > = values in this interval not displayed. Beta-Hydroxybutyrate   Date Value Ref Range Status   10/19/2022 >4.50 (H) 0.02 - 0.27 mmol/L Final     Lab Results   Component Value Date/Time    LABA1C 11.2 10/19/2022 11:22 PM    LABA1C 5.9 01/11/2022 09:13 AM     Lab Results   Component Value Date/Time    TSH 0.186 (L) 10/19/2022 11:22 PM    T4FREE 0.92 (L) 10/19/2022 11:22 PM     Lab Results   Component Value Date/Time    LABA1C 11.2 10/19/2022 11:22 PM    GLUCOSE 236 10/20/2022 06:50 AM    MALBCR - 10/20/2022 09:30 AM    LABMICR <12.0 10/20/2022 09:30 AM    LABCREA 123 10/20/2022 09:30 AM     Lab Results   Component Value Date/Time    TRIG 245 01/22/2015 09:58 AM    HDL 68 01/22/2015 09:58 AM    LDLCALC 68 01/22/2015 09:58 AM    CHOL 185 01/22/2015 09:58 AM       Blood culture   No results found for: [facility]    Radiology:  CT Head WO Contrast   Final Result   No acute intracranial abnormality. CT ABDOMEN PELVIS W IV CONTRAST Additional Contrast? None   Final Result   1. No acute process in abdomen or pelvis. 2. Diverticulosis. XR CHEST PORTABLE   Final Result   No evidence of pneumonia or pleural effusion.              Medical Records/Labs/Images review:   I personally reviewed and summarized previous records   All labs and imaging were reviewed independently     Al. Keisha Falcon [address], a 42 y.o.-old male seen today for inpatient diabetes management     Newly diagnosed Diabetes Mellitus admitted with DKA and BG of 999  A1c 11.2%   will change diabetes regimen to:  Lantus 20u daily  Humalog 5u with meals   Medium ss   Continue glucose check with meals and at bedtime   Will titrate insulin dose based on the blood glucose trend & insulin requirement  With underlying cognitive impairment, insulin therapy will likely be challenging. Pt will be discharged to his family's house and his sister is welling to help with insulin administration  Will check C-peptide and PANDA-65 Ab   Will arrange for patient to be seen in endocrinology clinic upon discharge for routine diabetes maintenance and prevention. Interdisciplinary plan for communication with healthcare providers:   Consult recommendations were discussed with the Primary Service/Nursing staff      The above issues were reviewed with the patient who understood and agreed with the plan. Thank you for allowing us to participate in the care of this patient. Please do not hesitate to contact us with any additional questions. Tyson Boxer MD  Endocrinologist, [facility]   [address] [place] [id number]   Phone: [phone]  Fax: [phone]  --------------------------------------------  An electronic signature was used to authenticate this note.  Zack Bernheim, MD on 10/20/2022 at 7:34 PM

## 2022-10-20 NOTE — PLAN OF CARE
Problem: Discharge Planning  Goal: Discharge to home or other facility with appropriate resources  Outcome: Progressing  Flowsheets  Taken 10/19/2022 2357  Discharge to home or other facility with appropriate resources: Identify barriers to discharge with patient and caregiver  Taken 10/19/2022 2223  Discharge to home or other facility with appropriate resources: Identify barriers to discharge with patient and caregiver     Problem: Skin/Tissue Integrity  Goal: Absence of new skin breakdown  Description: 1. Monitor for areas of redness and/or skin breakdown  2. Assess vascular access sites hourly  3. Every 4-6 hours minimum:  Change oxygen saturation probe site  4. Every 4-6 hours:  If on nasal continuous positive airway pressure, respiratory therapy assess nares and determine need for appliance change or resting period.   Outcome: Progressing     Problem: Safety - Adult  Goal: Free from fall injury  Outcome: Progressing     Problem: Pain  Goal: Verbalizes/displays adequate comfort level or baseline comfort level  Outcome: Progressing

## 2022-10-21 PROBLEM — Z91.119 DIETARY NONCOMPLIANCE: Status: ACTIVE | Noted: 2022-10-21

## 2022-10-21 LAB
ANION GAP SERPL CALCULATED.3IONS-SCNC: 11 MMOL/L (ref 7–16)
BASOPHILS ABSOLUTE: 0.03 E9/L (ref 0–0.2)
BASOPHILS RELATIVE PERCENT: 0.3 % (ref 0–2)
BUN BLDV-MCNC: 15 MG/DL (ref 6–20)
CALCIUM SERPL-MCNC: 9 MG/DL (ref 8.6–10.2)
CHLORIDE BLD-SCNC: 103 MMOL/L (ref 98–107)
CO2: 21 MMOL/L (ref 22–29)
CREAT SERPL-MCNC: 0.8 MG/DL (ref 0.7–1.2)
EOSINOPHILS ABSOLUTE: 0.05 E9/L (ref 0.05–0.5)
EOSINOPHILS RELATIVE PERCENT: 0.5 % (ref 0–6)
GFR SERPL CREATININE-BSD FRML MDRD: >60 ML/MIN/1.73
GLUCOSE BLD-MCNC: 257 MG/DL (ref 74–99)
HCT VFR BLD CALC: 39 % (ref 37–54)
HEMOGLOBIN: 13.7 G/DL (ref 12.5–16.5)
IMMATURE GRANULOCYTES #: 0.04 E9/L
IMMATURE GRANULOCYTES %: 0.4 % (ref 0–5)
LYMPHOCYTES ABSOLUTE: 2.67 E9/L (ref 1.5–4)
LYMPHOCYTES RELATIVE PERCENT: 27.1 % (ref 20–42)
MAGNESIUM: 1.8 MG/DL (ref 1.6–2.6)
MCH RBC QN AUTO: 29.5 PG (ref 26–35)
MCHC RBC AUTO-ENTMCNC: 35.1 % (ref 32–34.5)
MCV RBC AUTO: 84.1 FL (ref 80–99.9)
METER GLUCOSE: 201 MG/DL (ref 74–99)
METER GLUCOSE: 237 MG/DL (ref 74–99)
METER GLUCOSE: 338 MG/DL (ref 74–99)
METER GLUCOSE: 450 MG/DL (ref 74–99)
MONOCYTES ABSOLUTE: 0.51 E9/L (ref 0.1–0.95)
MONOCYTES RELATIVE PERCENT: 5.2 % (ref 2–12)
MRSA CULTURE ONLY: NORMAL
NEUTROPHILS ABSOLUTE: 6.56 E9/L (ref 1.8–7.3)
NEUTROPHILS RELATIVE PERCENT: 66.5 % (ref 43–80)
PDW BLD-RTO: 11.6 FL (ref 11.5–15)
PHOSPHORUS: 1.2 MG/DL (ref 2.5–4.5)
PLATELET # BLD: 174 E9/L (ref 130–450)
PMV BLD AUTO: 11.3 FL (ref 7–12)
POTASSIUM SERPL-SCNC: 3.5 MMOL/L (ref 3.5–5)
RBC # BLD: 4.64 E12/L (ref 3.8–5.8)
SODIUM BLD-SCNC: 135 MMOL/L (ref 132–146)
WBC # BLD: 9.9 E9/L (ref 4.5–11.5)

## 2022-10-21 PROCEDURE — 2500000003 HC RX 250 WO HCPCS

## 2022-10-21 PROCEDURE — 85025 COMPLETE CBC W/AUTO DIFF WBC: CPT

## 2022-10-21 PROCEDURE — 2580000003 HC RX 258

## 2022-10-21 PROCEDURE — 82962 GLUCOSE BLOOD TEST: CPT

## 2022-10-21 PROCEDURE — 80048 BASIC METABOLIC PNL TOTAL CA: CPT

## 2022-10-21 PROCEDURE — 83735 ASSAY OF MAGNESIUM: CPT

## 2022-10-21 PROCEDURE — 6370000000 HC RX 637 (ALT 250 FOR IP): Performed by: INTERNAL MEDICINE

## 2022-10-21 PROCEDURE — 6360000002 HC RX W HCPCS: Performed by: NURSE PRACTITIONER

## 2022-10-21 PROCEDURE — 1200000000 HC SEMI PRIVATE

## 2022-10-21 PROCEDURE — 99232 SBSQ HOSP IP/OBS MODERATE 35: CPT | Performed by: INTERNAL MEDICINE

## 2022-10-21 PROCEDURE — 36415 COLL VENOUS BLD VENIPUNCTURE: CPT

## 2022-10-21 PROCEDURE — 84681 ASSAY OF C-PEPTIDE: CPT

## 2022-10-21 PROCEDURE — 84100 ASSAY OF PHOSPHORUS: CPT

## 2022-10-21 PROCEDURE — 83516 IMMUNOASSAY NONANTIBODY: CPT

## 2022-10-21 RX ORDER — INSULIN GLARGINE 100 [IU]/ML
10 INJECTION, SOLUTION SUBCUTANEOUS ONCE
Status: COMPLETED | OUTPATIENT
Start: 2022-10-21 | End: 2022-10-21

## 2022-10-21 RX ORDER — INSULIN LISPRO 100 [IU]/ML
10 INJECTION, SOLUTION INTRAVENOUS; SUBCUTANEOUS
Status: DISCONTINUED | OUTPATIENT
Start: 2022-10-22 | End: 2022-10-24

## 2022-10-21 RX ORDER — INSULIN LISPRO 100 [IU]/ML
10 INJECTION, SOLUTION INTRAVENOUS; SUBCUTANEOUS
Status: DISCONTINUED | OUTPATIENT
Start: 2022-10-21 | End: 2022-10-21

## 2022-10-21 RX ORDER — INSULIN LISPRO 100 [IU]/ML
6 INJECTION, SOLUTION INTRAVENOUS; SUBCUTANEOUS
Status: DISCONTINUED | OUTPATIENT
Start: 2022-10-21 | End: 2022-10-21

## 2022-10-21 RX ORDER — INSULIN GLARGINE 100 [IU]/ML
30 INJECTION, SOLUTION SUBCUTANEOUS EVERY MORNING
Status: DISCONTINUED | OUTPATIENT
Start: 2022-10-22 | End: 2022-10-24

## 2022-10-21 RX ADMIN — INSULIN LISPRO 6 UNITS: 100 INJECTION, SOLUTION INTRAVENOUS; SUBCUTANEOUS at 16:40

## 2022-10-21 RX ADMIN — HEPARIN SODIUM 5000 UNITS: 10000 INJECTION INTRAVENOUS; SUBCUTANEOUS at 00:57

## 2022-10-21 RX ADMIN — INSULIN LISPRO 8 UNITS: 100 INJECTION, SOLUTION INTRAVENOUS; SUBCUTANEOUS at 11:44

## 2022-10-21 RX ADMIN — INSULIN LISPRO 12 UNITS: 100 INJECTION, SOLUTION INTRAVENOUS; SUBCUTANEOUS at 07:53

## 2022-10-21 RX ADMIN — INSULIN LISPRO 6 UNITS: 100 INJECTION, SOLUTION INTRAVENOUS; SUBCUTANEOUS at 11:45

## 2022-10-21 RX ADMIN — INSULIN GLARGINE 20 UNITS: 100 INJECTION, SOLUTION SUBCUTANEOUS at 07:53

## 2022-10-21 RX ADMIN — HEPARIN SODIUM 5000 UNITS: 10000 INJECTION INTRAVENOUS; SUBCUTANEOUS at 07:52

## 2022-10-21 RX ADMIN — INSULIN LISPRO 4 UNITS: 100 INJECTION, SOLUTION INTRAVENOUS; SUBCUTANEOUS at 16:38

## 2022-10-21 RX ADMIN — HEPARIN SODIUM 5000 UNITS: 10000 INJECTION INTRAVENOUS; SUBCUTANEOUS at 16:41

## 2022-10-21 RX ADMIN — INSULIN GLARGINE 10 UNITS: 100 INJECTION, SOLUTION SUBCUTANEOUS at 11:44

## 2022-10-21 RX ADMIN — INSULIN LISPRO 5 UNITS: 100 INJECTION, SOLUTION INTRAVENOUS; SUBCUTANEOUS at 07:54

## 2022-10-21 RX ADMIN — SODIUM PHOSPHATE, MONOBASIC, MONOHYDRATE 20 MMOL: 276; 142 INJECTION, SOLUTION INTRAVENOUS at 07:52

## 2022-10-21 ASSESSMENT — PAIN SCALES - GENERAL: PAINLEVEL_OUTOF10: 0

## 2022-10-21 NOTE — ADT AUTH CERT
Utilization Reviews       Diabetes - Care Day 2 (10/20/2022) by Barbara Renee RN       Review Status Review Entered   Completed 10/20/2022 1616       Created By   Barbara Renee RN      Criteria Review      Care Day: 2 Care Date: 10/20/2022 Level of Care: ICU    Guideline Day 2    Level Of Care    (X) Floor    10/20/2022 4:16 PM EDT by Oswaldo Stein      patient transferred from ICU to ortho floor this afternoon    Clinical Status    (X) * Hypotension absent    10/20/2022 4:16 PM EDT by Oswaldo Stein      125/77 111/72  108/71    (X) * Mental status at baseline    10/20/2022 4:16 PM EDT by Oswaldo Stein      A and O x3    ( ) * Acidosis absent or improved    ( ) * Blood glucose under acceptable control or improved    10/20/2022 4:16 PM EDT by Oswaldo Stein      Glucose   370 (H)   314 (H)   333 (H)  303 H    (X) * Dehydration absent    10/20/2022 4:16 PM EDT by Oswaldo Stein      Sodium 140    (X) * Electrolyte abnormalities absent or improved    10/20/2022 4:16 PM EDT by Oswaldo Stein      Sodium: 133  Potassium: 4.2  Chloride: 99    (X) * Renal function at baseline or improved    10/20/2022 4:16 PM EDT by Oswaldo Stein      BUN,BUNPL: 24 (H)  Creatinine: 0.9    Activity    ( ) * Ambulatory    Routes    (X) * Oral hydration    10/20/2022 4:16 PM EDT by Oswaldo Stein      regular 4carb choices 60mg/meal    ( ) * Oral medications    10/20/2022 4:16 PM EDT by Oswaldo Stein      no oral medications today    (X) [place] diet, advance as tolerated    10/20/2022 4:16 PM EDT by Oswaldo Stein      regular diet    Interventions    (X) Complete IV volume, replacement,    10/20/2022 4:16 PM EDT by Oswaldo Stein      0.9 % sodium chloride infusion 250 mL/hr Freq: CONTINUOUS IV  dextrose 5 % and 0.45 % sodium chloride infusion 150ml/hr iv continuous    (X) Serum glucose, serum ketones, chemistries, ABGs or venous pH measurements, urinalysis    10/20/2022 4:16 PM EDT by Oswaldo Stein      Glucose   370 (H)   314 (H)   333 (H)  303 (H)   221 (H)   180 (H)  261 (H)  263 H   369H  187 H  250H  osmolality     (X) Diabetic education    10/20/2022 4:16 PM EDT by Tye Germain      diabetes education consult    Medications    ( ) * IV insulin absent    10/20/2022 4:16 PM EDT by Tye Germain      insulin regular (HUMULIN R;NOVOLIN R) 100 Units in sodium chloride 0.9 % 100 mL infusion titrate iv weaned    ( ) * Outpatient diabetic medication regimen initiated    (X) Subcutaneous insulin    10/20/2022 4:16 PM EDT by Tye Germain      insulin glargine (LANTUS) injection vial 20 Units daily SC  insulin lispro (HUMALOG) injection vial 0-4 Units nightly SC ssi    (X) Potassium and other electrolyte supplements as indicated    10/20/2022 4:16 PM EDT by Tye Germain      potassium chloride 10 mEq/100 mL IVPB (Peripheral Line) prn x9   sodium phosphate 10 mmol in sodium chloride 0.9 % 250 mL IVPB prn x1    * Milestone   Additional Notes   DATE: 10/20/2022 Care Day 2         PERTINENT UPDATES:   Pt reports burning and pain with urination same as yesterday.    Nauseous after minimal lunch today   Glucose still not within normal limits   Still requiring insulin drip      VITALS:   98.7 (37.1) 16 96 119/91 100% on NC 2lpm      ABNL/PERTINENT LABS/RADIOLOGY/DIAGNOSTIC STUDIES:      CO2: 21 (L)   BUN,BUNPL: 28 (H)   Calcium, Ionized: 1.36 (H)         PHYSICAL EXAM:   General Appearance: alert and oriented to person, place and time and in no acute distress   Skin: warm and dry   Abdomen: soft, non-tender, non-distended      MD CONSULTS/ASSESSMENT AND PLAN:    MD Plan   Assessment:       Principal Problem:     DKA, type 2    Active Problems:     AMS (altered mental status)     Hypercalcemia     Elevated lactic acid level       26-year-old male with history of hypertension, hyperlipidemia, no prior history of diabetes mellitus, who presented to ED with abdominal pain, nausea/vomiting, polydipsia, polyuria, headache, blurry vision, dizziness, chest pain and was admitted to ICU for DKA, day 2. Plan:       DKA   BG >900>>261. A1c on admission 11.3%.   -Critical care managing, appreciate care   -Insulin infusion   -IVF per glucose and sodium levels   -Kcl    -Endocrinology consulted   -Formerly undiagnosed, family/patient/caregiver will need diabetic education   -Check microalbumin, ACEi if indicated   -On 2 L NC, wean O2       Hypophosphatemia   2.3.   -Sodium phosphate 10 mmol prn       Hypercalcemia   Ionized Ca++ wnl, PTH low 10, vitamin D low 17, TSH low, Free T4 low. -Check CK, cortisol   -Consider nephrology consult   -50,000 units vitamin D weekly for 8 weeks   -Possibly reassess TSH       Incontinence, dysuria   -Urine cx pending   -Continue hydration       Blurred vision   -Follow with ophthalmology outpatient        Abdominal pain   Lipase 505, triglycerides 245, alk phos 221. CT abdomen no acute findings except diverticulosis. Could be related to urinary discomfort.   -Repeat lipase tomorrow        Nausea   -Zofran Q6H prn   -Treat before meals       Hx HTN   -Formerly on amlodipine   -Trend and treat if >130 sbp       High anion gap metabolic acidosis, resolved   AG 32 on admission, bicarb 17, pH 7.31. AG today 8.        SMITHA, resolved   FeNa <0.1% prerenal       DVT prophylaxis heparin gtt      Adult diet 4 carbs          MEDICATIONS:   heparin (porcine) injection 5,000 Units q8h SC x1   ondansetron (ZOFRAN) injection 4 mg q6h prn iv x1      ORDERS:   Regular diet   Dietician consult   Skin assessment   Daily weights   IS q2h

## 2022-10-21 NOTE — HOME CARE
RECEIVED REFERRAL FOR [facility] ADVISED MICHAEL SANCHEZ THAT OUR FIRST AVAILABLE [facility] DATE IS NEXT Thursday 10/27/2022 D/C IS UNKNOWN AT THIS TIME.      Rohini Pierson LPN   [facility] AT [facility]

## 2022-10-21 NOTE — PROGRESS NOTES
ENDOCRINOLOGY PROGRESS NOTE      Date of admission: 10/19/2022  Date of service: 10/21/2022  Admitting physician: Daniel Castro DO   Primary Care Physician: Alicia Richardson MD  Consultant physician: Jalil Jung MD     Reason for the consultation:  Uncontrolled DM    History of Present Illness: The history is provided by the patient's sister     Grace Meneses is a very pleasant 42 y.o. old male with PMH of HTN, hyperTG and other listed below admitted to [facility] on 10/19/2022 because of polyuria, polydipsia, urine incontinence and found to be in DKA, endocrine service was consulted for diabetes management. Subjective   Seen and examined, no acute events, BG was high this AM     Inpatient diet:   Carb Restricted diet     Point of care glucose monitoring   (Independently reviewed)   Recent Labs     10/20/22  1001 10/20/22  1101 10/20/22  1233 10/20/22  1559 10/20/22  2201 10/21/22  0632 10/21/22  1058 10/21/22  1556   GLUMET 369* 187* 250* 306* 280* 450* 338* 237*     Scheduled Meds:   [START ON 10/22/2022] insulin glargine  30 Units SubCUTAneous QAM    [START ON 10/22/2022] insulin lispro  10 Units SubCUTAneous TID WC    insulin lispro  0-4 Units SubCUTAneous Nightly    insulin lispro  0-12 Units SubCUTAneous TID WC    heparin (porcine)  5,000 Units SubCUTAneous Q8H       PRN Meds:   glucose, 4 tablet, PRN  glucagon (rDNA), 1 mg, PRN  ondansetron, 4 mg, Q6H PRN  potassium chloride, 10 mEq, PRN  magnesium sulfate, 1,000 mg, PRN  sodium phosphate IVPB, 10 mmol, PRN   Or  sodium phosphate IVPB, 15 mmol, PRN   Or  sodium phosphate IVPB, 20 mmol, PRN  sodium chloride flush, 10 mL, PRN  dextrose bolus, 125 mL, PRN   Or  dextrose bolus, 250 mL, PRN  polyethylene glycol, 17 g, Daily PRN  dextrose 5 % and 0.45 % NaCl, , Continuous PRN  medicated lip balm, , PRN    Continuous Infusions:   dextrose 5 % and 0.45 % NaCl Stopped (10/20/22 1102)       Review of Systems  All systems reviewed.  All negative except for symptoms mentioned in HPI     OBJECTIVE    BP (!) 122/91   Pulse (!) 103   Temp 98.8 °F (37.1 °C) (Oral)   Resp 16   Ht 5' 3\" (1.6 m)   Wt 162 lb (73.5 kg)   SpO2 96%   BMI 28.70 kg/m²     Intake/Output Summary (Last 24 hours) at 10/21/2022 1933  Last data filed at 10/21/2022 1218  Gross per 24 hour   Intake --   Output 550 ml   Net -550 ml       Physical examination:  General: awake alert, oriented x3  HEENT: normocephalic non traumatic, no exophthalmos   Neck: supple, No thyroid tenderness,  Pulm: good equal air entry no added sounds  CVS: S1 + S2  Abd: soft lax, no tenderness  Skin: warm, no lesions, no rash. No open wounds, no ulcers   Neuro: CN intact, sensation decreased bilateral , muscle power normal  Psych: normal mood, and affect    Review of Laboratory Data:  I personally reviewed the following labs:   Recent Labs     10/19/22  1844 10/20/22  0650 10/21/22  0327   WBC 12.7* 10.5 9.9   RBC 6.41* 4.70 4.64   HGB 18.8* 13.9 13.7   HCT 53.8 39.1 39.0   MCV 83.9 83.2 84.1   MCH 29.3 29.6 29.5   MCHC 34.9* 35.5* 35.1*   RDW 11.5 11.7 11.6    194 174   MPV 11.4 10.9 11.3     Recent Labs     10/19/22  1844 10/19/22  1849 10/20/22  0245 10/20/22  0650 10/21/22  0327   *   < > 133 140 135   K 5.8*   < > 4.2 4.1 3.5   CL 72*   < > 99 109* 103   CO2 17*   < > 21* 23 21*   BUN 41*   < > 28* 24* 15   CREATININE 1.5*   < > 1.0 0.9 0.8   GLUCOSE 924*   < > 321* 236* 257*   CALCIUM 13.7*   < > 9.5 8.8 9.0   PROT 10.0*  --   --   --   --    LABALBU 5.0  --   --   --   --    BILITOT 0.7  --   --   --   --    ALKPHOS 221*  --   --   --   --    AST 20  --   --   --   --    ALT 30  --   --   --   --     < > = values in this interval not displayed.      Beta-Hydroxybutyrate   Date Value Ref Range Status   10/19/2022 >4.50 (H) 0.02 - 0.27 mmol/L Final     Lab Results   Component Value Date/Time    LABA1C 11.2 10/19/2022 11:22 PM    LABA1C 5.9 01/11/2022 09:13 AM     Lab Results   Component Value Date/Time    TSH 0.186 (L) 10/19/2022 11:22 PM    T4FREE 0.92 (L) 10/19/2022 11:22 PM     Lab Results   Component Value Date/Time    LABA1C 11.2 10/19/2022 11:22 PM    GLUCOSE 257 10/21/2022 03:27 AM    MALBCR - 10/20/2022 09:30 AM    LABMICR <12.0 10/20/2022 09:30 AM    LABCREA 123 10/20/2022 09:30 AM     Lab Results   Component Value Date/Time    TRIG 245 01/22/2015 09:58 AM    HDL 68 01/22/2015 09:58 AM    LDLCALC 68 01/22/2015 09:58 AM    CHOL 185 01/22/2015 09:58 AM       Blood culture   Lab Results   Component Value Date/Time    BC 24 Hours no growth 10/19/2022 08:05 PM       Radiology:  CT Head WO Contrast   Final Result   No acute intracranial abnormality. CT ABDOMEN PELVIS W IV CONTRAST Additional Contrast? None   Final Result   1. No acute process in abdomen or pelvis. 2. Diverticulosis. XR CHEST PORTABLE   Final Result   No evidence of pneumonia or pleural effusion. Medical Records/Labs/Images review:   I personally reviewed and summarized previous records   All labs and imaging were reviewed independently     Al. Keisha Falcon [address], a 42 y.o.-old male seen today for inpatient diabetes management     Newly diagnosed Diabetes Mellitus admitted with DKA and BG of 999  A1c 11.2%   will change diabetes regimen to:  Lantus 30u daily in AM  Humalog 10u with meals   Medium ss   Continue glucose check with meals and at bedtime   Will titrate insulin dose based on the blood glucose trend & insulin requirement  With underlying cognitive impairment, insulin therapy will likely be challenging.  Pt will be discharged to his family's house and his sister is welling to help with insulin administration  C-peptide and PANDA-65 Ab in process   Will arrange for patient to be seen in endocrinology clinic very soon after discharge     Dietary noncompliance  Education is very challenging given his cognitive impairment  Discussed with patient' family the importance of eating consistent carbohydrate meals, avoiding high glycemic index food. Also, discussed with patient the risk and negative consequences of dietary noncompliance on blood glucose control     Interdisciplinary plan for communication with healthcare providers:   Consult recommendations were discussed with the Primary Service/Nursing staff      The above issues were reviewed with the patient who understood and agreed with the plan. Thank you for allowing us to participate in the care of this patient. Please do not hesitate to contact us with any additional questions. Tesha Shah MD  Endocrinologist, [facility] Diabetes Care and Endocrinology   [address] [place] [id number]   Phone: [phone]  Fax: [phone]  --------------------------------------------  An electronic signature was used to authenticate this note.  Sophie Calderon MD on 10/21/2022 at 7:33 PM

## 2022-10-21 NOTE — PROGRESS NOTES
Spoke to Diabetic educator Santi Wood about reaching out to patient's mother and sister for teaching since the plan is for patient to discharge to mom's house when medically stable.

## 2022-10-21 NOTE — PROGRESS NOTES
Spoke with patient's sister, Clinton Simeon. We will plan to meet for a group diabetes education session with the patient, Clinton Simeon, and their mom, Khadijah Gordon, around 2 p.m. today. Patient will be returning home to stay with his family after discharge so that he may receive assistance with his care.  Herman Martinez RN, BSN, [place]

## 2022-10-21 NOTE — CARE COORDINATION
Social Work:    Patient transferred from ICU. Admitted due to nausea, vomiting, dizziness, and found to be in DKA. LYUDMILA Mendoza advised social work that Nikolas Jeong is delayed and will need assistance with plans. Social service placed a call to Mrs. Anali Baker, patient's mother, advised her about social service &  roles, and discussed discharge planning. Marie advised that Nikolas Jeong is developmentally delayed. His PCP is located at [facility] and Nikolas Jeong obtains his medications at [facility]. Nikolas Jeong has resided alone in an apartment x 8 years. Armando Toledo tries to help him as much as possible, however, Nikolas Jeong will permit her to be his Guardian which limits how much she can help Nikolas Jeong. Armando Toledo advises that she reminds Nikolas Jeong to pay his rent using twelve twenty dollar bills and tries to keep things simple for Nikolas Jeong, however, Armando Toledo states Nikolas Jeong can be stubborn. Nikolas Jeong rides his bike [employer] according to Armando Toledo, as well as utilizes the [employer]. Armando Toledo agrees that Nikolas Jeong will be unable to care of his diabetic needs, therefore, she expects him to discharge to her home for assistance. Social work advised Armando Toledo about possible snf and she states Nikolas Jeong was already advised and prefers to discharge to her home. Armando Toledo would appreciate any assistance that Nikolas Jeong may qualify for. She is receptive to Estefani  and assistance obtaining a  through [facility] ins. Social work offered Beth Brenabe has no preference. A referral was given to [facility] but they cannot start care until Thursday. Social work asked to place Nikolas Jeong on for Thursday, as well as on a cancellation list if he can be seen sooner. Social work to reach out to [facility] to obtain  assistance.      Electronically signed by DANIEL De Anda on 10/21/2022 at 1:33 PM

## 2022-10-21 NOTE — PROGRESS NOTES
Provided diabetes education to patient yesterday. Today, I returned to meet with mom, Carmen Morris, and sister, Julio César Kerns at bedside with patient. Reviewed diabetes survival guide in its entirety, including:  Definition of diabetes   Target glucose ranges/A1C   Self-monitoring of blood glucose   Prevention/symptoms/treatment of hypo-/hyperglycemia   Medication adherence   The plate method/meal planning guidelines   The benefits of exercise and recommendations   Reducing the risk of chronic complications    Patient has a developmental delay, but is able to grasp basic concepts such as the plate method and how insulin works. Patient agrees to limit sweets and eliminate sugary beverages from his diet. He bikes almost everywhere he travels and understands that exercise will help lower his glucose levels. Education provided to family regarding current basal/bolus regimen of Lantus and Humalog including differences in types of insulin, timing with meals, onset, duration of effect and peak of insulin dose. Hypoglycemia signs, symptoms and treatments reviewed and literature provided. Patient and family instructed on the preparation and injection of insulin dose via pen method. Patient and family verbalized understanding of site rotation, storage and proper sharps disposal. Julio César Kerns and Marie plan to help give patient his insulin injections at home--he will be staying with his mom for a while, although he eventually plans to move back into his apartment where he lives alone. Patient would benefit from a CGM, but when I attempted to setup the FreeStyle Walker 2 alejandro on his phone, it stated that it was not compatible. Patient will need a script for a FreeStyle Renny 2 (or 3) CGM. Family would prefer to have this script prior to discharge so that they can pick it up and have it placed on patient before he goes home. Encouraged patient and family to call with questions.      Nicholas Lang, RN, BSN, [place]

## 2022-10-21 NOTE — PROGRESS NOTES
[facility] Progress Note    Admitting Date and Time: 10/19/2022  6:17 PM  Admit Dx: DKA, type 1, not at goal [facility]) [E10.10]    Subjective:  Patient is being followed for DKA, type 1, not at goal [facility]) [E10.10]     Out of ICU. Seen at bedside. Awake and alert. Not completely oriented but pleasant. Afebrile. Tolerating medications, no side effects. No acute events overnight     ROS: denies fever, chills, cp, sob, n/v, HA unless stated above.      insulin lispro  0-4 Units SubCUTAneous Nightly    insulin glargine  20 Units SubCUTAneous QAM    insulin lispro  5 Units SubCUTAneous TID WC    insulin lispro  0-12 Units SubCUTAneous TID WC    heparin (porcine)  5,000 Units SubCUTAneous Q8H     glucose, 4 tablet, PRN  glucagon (rDNA), 1 mg, PRN  ondansetron, 4 mg, Q6H PRN  potassium chloride, 10 mEq, PRN  magnesium sulfate, 1,000 mg, PRN  sodium phosphate IVPB, 10 mmol, PRN   Or  sodium phosphate IVPB, 15 mmol, PRN   Or  sodium phosphate IVPB, 20 mmol, PRN  sodium chloride flush, 10 mL, PRN  dextrose bolus, 125 mL, PRN   Or  dextrose bolus, 250 mL, PRN  polyethylene glycol, 17 g, Daily PRN  dextrose 5 % and 0.45 % NaCl, , Continuous PRN  medicated lip balm, , PRN         Objective:    BP (!) 122/91   Pulse (!) 103   Temp 98.8 °F (37.1 °C) (Oral)   Resp 16   Ht 5' 3\" (1.6 m)   Wt 162 lb (73.5 kg)   SpO2 96%   BMI 28.70 kg/m²     General Appearance: alert and oriented to person, place and time and in no acute distress - some cognitive issues but pleasant   Skin: warm and dry  Head: normocephalic and atraumatic  Eyes: pupils equal, round, and reactive to light, extraocular eye movements intact, conjunctivae normal  Neck: neck supple and non tender without mass   Pulmonary/Chest: clear to auscultation bilaterally- no wheezes, rales or rhonchi, normal air movement, no respiratory distress  Cardiovascular: normal rate, normal S1 and S2 and no carotid bruits  Abdomen: soft, non-tender, non-distended, normal bowel sounds, no masses or organomegaly  Extremities: no cyanosis, no clubbing and no edema  Neurologic: no cranial nerve deficit and speech normal  Peripheral       Recent Labs     10/20/22  0245 10/20/22  0650 10/21/22  0327    140 135   K 4.2 4.1 3.5   CL 99 109* 103   CO2 21* 23 21*   BUN 28* 24* 15   CREATININE 1.0 0.9 0.8   GLUCOSE 321* 236* 257*   CALCIUM 9.5 8.8 9.0       Recent Labs     10/19/22  1844 10/20/22  0650 10/21/22  0327   WBC 12.7* 10.5 9.9   RBC 6.41* 4.70 4.64   HGB 18.8* 13.9 13.7   HCT 53.8 39.1 39.0   MCV 83.9 83.2 84.1   MCH 29.3 29.6 29.5   MCHC 34.9* 35.5* 35.1*   RDW 11.5 11.7 11.6    194 174   MPV 11.4 10.9 11.3       Radiology: reviewed     Assessment:    Principal Problem:    DKA, type 2 (HCC)  Active Problems:    Diabetic ketoacidosis without coma associated with type 1 diabetes mellitus ([facility] [address]    AMS (altered mental status)    Hypercalcemia    Elevated lactic acid level    Hyperlipidemia    Newly diagnosed diabetes ([facility] [address]    Primary hypertension    Seizure ([facility] [address]  Resolved Problems:    * No resolved hospital problems. *      Plan:  DKA  BG >900>>261. A1c on admission 11.3%.  -Insulin drip, transitioned to SQ insulin   Lantus 20 U daily  Humalog 5 U with meals  Medium dose sliding scale   -IVF per glucose and sodium levels  -Kcl   -Endocrinology consulted  -Formerly undiagnosed, family/patient/caregiver will need diabetic education - sister and mother to participate in diabetic education together as patient is not cognitively able to participate in care. - wean O2 to room air     AMS   Ams on admit  CT head negative for hemorrhage or ischemic changes      Hypophosphatemia  2.3 < 1.2  -Sodium phosphate 10 mmol prn     Hypercalcemia RESOLVED   Ca 13.7 < 9  Ionized Ca++ wnl, PTH low 10, vitamin D low 17, TSH low, Free T4 low.   -CK 85, cortisol 19.40, TSH 0.186  -Consider nephrology consult  -50,000 units vitamin D weekly for 8 weeks    Blurred vision  -Follow with ophthalmology outpatient      Abdominal pain  Lipase 505, triglycerides 245, alk phos 221. CT abdomen no acute findings except diverticulosis. Could be related to urinary discomfort. Hx HTN  -Formerly on amlodipine  -Trend and treat if >130 sbp  - consider restarting ACEi after microalbumin checked (< 12)     High anion gap metabolic acidosis, resolved  AG 32 on admission, bicarb 17, pH 7.31. AG 8. Anion gap 11      SMITHA, resolved  FeNa <0.1% prerenal  K 3.5, Mag 1.8 - replete if needed   Cr 0.8    CODE: full  DVT prophylaxis: Heparin   Discharge dispo: home with mother due to DM     30 minutes time spent reviewing patient chart, assessing patient, discussing plan of care with patient and family, discussing plan of care with collaborating physician, and charting.      Electronically signed by MARIA TERESA Eldridge NP on 10/21/2022 at 9:45 AM

## 2022-10-22 PROBLEM — E08.10 DIABETIC KETOACIDOSIS WITHOUT COMA ASSOCIATED WITH DIABETES MELLITUS DUE TO UNDERLYING CONDITION (HCC): Status: ACTIVE | Noted: 2022-10-19

## 2022-10-22 LAB
ACINETOBACTER CALCOAC BAUMANNII COMPLEX BY PCR: NOT DETECTED
ANION GAP SERPL CALCULATED.3IONS-SCNC: 9 MMOL/L (ref 7–16)
BACTEROIDES FRAGILIS BY PCR: NOT DETECTED
BASOPHILS ABSOLUTE: 0.02 E9/L (ref 0–0.2)
BASOPHILS RELATIVE PERCENT: 0.3 % (ref 0–2)
BOTTLE TYPE: ABNORMAL
BUN BLDV-MCNC: 12 MG/DL (ref 6–20)
CALCIUM SERPL-MCNC: 8.8 MG/DL (ref 8.6–10.2)
CANDIDA ALBICANS BY PCR: NOT DETECTED
CANDIDA AURIS BY PCR: NOT DETECTED
CANDIDA GLABRATA BY PCR: NOT DETECTED
CANDIDA KRUSEI BY PCR: NOT DETECTED
CANDIDA PARAPSILOSIS BY PCR: NOT DETECTED
CANDIDA TROPICALIS BY PCR: NOT DETECTED
CHLORIDE BLD-SCNC: 103 MMOL/L (ref 98–107)
CO2: 25 MMOL/L (ref 22–29)
CREAT SERPL-MCNC: 0.7 MG/DL (ref 0.7–1.2)
CRYPTOCOCCUS NEOFORMANS/GATTII BY PCR: NOT DETECTED
ENTEROBACTER CLOACAE COMPLEX BY PCR: NOT DETECTED
ENTEROBACTERALES BY PCR: NOT DETECTED
ENTEROCOCCUS FAECALIS BY PCR: NOT DETECTED
ENTEROCOCCUS FAECIUM BY PCR: NOT DETECTED
EOSINOPHILS ABSOLUTE: 0.09 E9/L (ref 0.05–0.5)
EOSINOPHILS RELATIVE PERCENT: 1.6 % (ref 0–6)
ESCHERICHIA COLI BY PCR: NOT DETECTED
GFR SERPL CREATININE-BSD FRML MDRD: >60 ML/MIN/1.73
GLUCOSE BLD-MCNC: 142 MG/DL (ref 74–99)
HAEMOPHILUS INFLUENZAE BY PCR: NOT DETECTED
HCT VFR BLD CALC: 37 % (ref 37–54)
HEMOGLOBIN: 13.4 G/DL (ref 12.5–16.5)
IMMATURE GRANULOCYTES #: 0.02 E9/L
IMMATURE GRANULOCYTES %: 0.3 % (ref 0–5)
KLEBSIELLA AEROGENES BY PCR: NOT DETECTED
KLEBSIELLA OXYTOCA BY PCR: NOT DETECTED
KLEBSIELLA PNEUMONIAE GROUP BY PCR: NOT DETECTED
LISTERIA MONOCYTOGENES BY PCR: NOT DETECTED
LYMPHOCYTES ABSOLUTE: 2.82 E9/L (ref 1.5–4)
LYMPHOCYTES RELATIVE PERCENT: 49.1 % (ref 20–42)
MAGNESIUM: 2.1 MG/DL (ref 1.6–2.6)
MCH RBC QN AUTO: 29.5 PG (ref 26–35)
MCHC RBC AUTO-ENTMCNC: 36.2 % (ref 32–34.5)
MCV RBC AUTO: 81.3 FL (ref 80–99.9)
METER GLUCOSE: 166 MG/DL (ref 74–99)
METER GLUCOSE: 190 MG/DL (ref 74–99)
METER GLUCOSE: 220 MG/DL (ref 74–99)
METER GLUCOSE: 292 MG/DL (ref 74–99)
METER GLUCOSE: 69 MG/DL (ref 74–99)
METHICILLIN RESISTANCE MECA/C  BY PCR: DETECTED
MONOCYTES ABSOLUTE: 0.48 E9/L (ref 0.1–0.95)
MONOCYTES RELATIVE PERCENT: 8.4 % (ref 2–12)
NEISSERIA MENINGITIDIS BY PCR: NOT DETECTED
NEUTROPHILS ABSOLUTE: 2.31 E9/L (ref 1.8–7.3)
NEUTROPHILS RELATIVE PERCENT: 40.3 % (ref 43–80)
ORDER NUMBER: ABNORMAL
PDW BLD-RTO: 11.3 FL (ref 11.5–15)
PHOSPHORUS: 2.2 MG/DL (ref 2.5–4.5)
PLATELET # BLD: 156 E9/L (ref 130–450)
PMV BLD AUTO: 11 FL (ref 7–12)
POTASSIUM SERPL-SCNC: 3.1 MMOL/L (ref 3.5–5)
PROTEUS SPECIES BY PCR: NOT DETECTED
PSEUDOMONAS AERUGINOSA BY PCR: NOT DETECTED
RBC # BLD: 4.55 E12/L (ref 3.8–5.8)
SALMONELLA SPECIES BY PCR: NOT DETECTED
SERRATIA MARCESCENS BY PCR: NOT DETECTED
SODIUM BLD-SCNC: 137 MMOL/L (ref 132–146)
SOURCE OF BLOOD CULTURE: ABNORMAL
STAPHYLOCOCCUS AUREUS BY PCR: NOT DETECTED
STAPHYLOCOCCUS EPIDERMIDIS BY PCR: DETECTED
STAPHYLOCOCCUS LUGDUNENSIS BY PCR: NOT DETECTED
STAPHYLOCOCCUS SPECIES BY PCR: DETECTED
STENOTROPHOMONAS MALTOPHILIA BY PCR: NOT DETECTED
STREPTOCOCCUS AGALACTIAE BY PCR: NOT DETECTED
STREPTOCOCCUS PNEUMONIAE BY PCR: NOT DETECTED
STREPTOCOCCUS PYOGENES  BY PCR: NOT DETECTED
STREPTOCOCCUS SPECIES BY PCR: NOT DETECTED
URINE CULTURE, ROUTINE: NORMAL
VITAMIN D 1,25-DIHYDROXY: 12.1 PG/ML (ref 19.9–79.3)
WBC # BLD: 5.7 E9/L (ref 4.5–11.5)

## 2022-10-22 PROCEDURE — 83735 ASSAY OF MAGNESIUM: CPT

## 2022-10-22 PROCEDURE — 6370000000 HC RX 637 (ALT 250 FOR IP): Performed by: INTERNAL MEDICINE

## 2022-10-22 PROCEDURE — 6360000002 HC RX W HCPCS

## 2022-10-22 PROCEDURE — 99233 SBSQ HOSP IP/OBS HIGH 50: CPT | Performed by: FAMILY MEDICINE

## 2022-10-22 PROCEDURE — 85025 COMPLETE CBC W/AUTO DIFF WBC: CPT

## 2022-10-22 PROCEDURE — 99232 SBSQ HOSP IP/OBS MODERATE 35: CPT | Performed by: INTERNAL MEDICINE

## 2022-10-22 PROCEDURE — 36415 COLL VENOUS BLD VENIPUNCTURE: CPT

## 2022-10-22 PROCEDURE — 6370000000 HC RX 637 (ALT 250 FOR IP): Performed by: FAMILY MEDICINE

## 2022-10-22 PROCEDURE — 84100 ASSAY OF PHOSPHORUS: CPT

## 2022-10-22 PROCEDURE — 80048 BASIC METABOLIC PNL TOTAL CA: CPT

## 2022-10-22 PROCEDURE — 1200000000 HC SEMI PRIVATE

## 2022-10-22 PROCEDURE — 82962 GLUCOSE BLOOD TEST: CPT

## 2022-10-22 PROCEDURE — 6360000002 HC RX W HCPCS: Performed by: NURSE PRACTITIONER

## 2022-10-22 RX ORDER — POTASSIUM CHLORIDE 20 MEQ/1
40 TABLET, EXTENDED RELEASE ORAL ONCE
Status: COMPLETED | OUTPATIENT
Start: 2022-10-22 | End: 2022-10-22

## 2022-10-22 RX ADMIN — INSULIN LISPRO 2 UNITS: 100 INJECTION, SOLUTION INTRAVENOUS; SUBCUTANEOUS at 08:17

## 2022-10-22 RX ADMIN — INSULIN LISPRO 4 UNITS: 100 INJECTION, SOLUTION INTRAVENOUS; SUBCUTANEOUS at 16:58

## 2022-10-22 RX ADMIN — POTASSIUM CHLORIDE 40 MEQ: 1500 TABLET, EXTENDED RELEASE ORAL at 15:16

## 2022-10-22 RX ADMIN — INSULIN LISPRO 10 UNITS: 100 INJECTION, SOLUTION INTRAVENOUS; SUBCUTANEOUS at 11:08

## 2022-10-22 RX ADMIN — Medication 16 G: at 20:52

## 2022-10-22 RX ADMIN — INSULIN LISPRO 10 UNITS: 100 INJECTION, SOLUTION INTRAVENOUS; SUBCUTANEOUS at 17:00

## 2022-10-22 RX ADMIN — HEPARIN SODIUM 5000 UNITS: 10000 INJECTION INTRAVENOUS; SUBCUTANEOUS at 02:29

## 2022-10-22 RX ADMIN — POTASSIUM CHLORIDE 10 MEQ: 7.46 INJECTION, SOLUTION INTRAVENOUS at 08:23

## 2022-10-22 RX ADMIN — INSULIN GLARGINE 30 UNITS: 100 INJECTION, SOLUTION SUBCUTANEOUS at 08:16

## 2022-10-22 RX ADMIN — POTASSIUM CHLORIDE 10 MEQ: 7.46 INJECTION, SOLUTION INTRAVENOUS at 13:39

## 2022-10-22 RX ADMIN — INSULIN LISPRO 6 UNITS: 100 INJECTION, SOLUTION INTRAVENOUS; SUBCUTANEOUS at 11:07

## 2022-10-22 RX ADMIN — INSULIN LISPRO 10 UNITS: 100 INJECTION, SOLUTION INTRAVENOUS; SUBCUTANEOUS at 08:18

## 2022-10-22 RX ADMIN — POTASSIUM & SODIUM PHOSPHATES POWDER PACK 280-160-250 MG 250 MG: 280-160-250 PACK at 20:40

## 2022-10-22 RX ADMIN — HEPARIN SODIUM 5000 UNITS: 10000 INJECTION INTRAVENOUS; SUBCUTANEOUS at 16:59

## 2022-10-22 RX ADMIN — HEPARIN SODIUM 5000 UNITS: 10000 INJECTION INTRAVENOUS; SUBCUTANEOUS at 08:17

## 2022-10-22 RX ADMIN — POTASSIUM & SODIUM PHOSPHATES POWDER PACK 280-160-250 MG 250 MG: 280-160-250 PACK at 16:56

## 2022-10-22 RX ADMIN — POTASSIUM CHLORIDE 10 MEQ: 7.46 INJECTION, SOLUTION INTRAVENOUS at 11:10

## 2022-10-22 ASSESSMENT — PAIN DESCRIPTION - ONSET: ONSET: ON-GOING

## 2022-10-22 ASSESSMENT — PAIN DESCRIPTION - FREQUENCY: FREQUENCY: INTERMITTENT

## 2022-10-22 ASSESSMENT — PAIN - FUNCTIONAL ASSESSMENT: PAIN_FUNCTIONAL_ASSESSMENT: ACTIVITIES ARE NOT PREVENTED

## 2022-10-22 ASSESSMENT — PAIN SCALES - GENERAL: PAINLEVEL_OUTOF10: 0

## 2022-10-22 NOTE — PROGRESS NOTES
Dietary manager notified of patient family request to speak with them regarding diet not being followed by dietary , and items being placed on tray that are above his carb count , including real sugar.

## 2022-10-22 NOTE — PROGRESS NOTES
[facility] Progress Note    Admitting Date and Time: 10/19/2022  6:17 PM  Admit Dx: DKA, type 1, not at goal [facility]) [E10.10]    Subjective:  Patient is being followed for DKA, type 1, not at goal [facility]) [E10.10]   Pt feels fine. Per RN:  K+ 3.1. Getting replacement via IV which is burning. Eating well. DC IV. Oral replacement for K+ and Phosphorus. ROS: denies fever, chills, cp, sob, n/v, HA unless stated above.       potassium chloride  40 mEq Oral Once    potassium & sodium phosphates  1 packet Oral 4x Daily    insulin glargine  30 Units SubCUTAneous QAM    insulin lispro  10 Units SubCUTAneous TID WC    insulin lispro  0-4 Units SubCUTAneous Nightly    insulin lispro  0-12 Units SubCUTAneous TID WC    heparin (porcine)  5,000 Units SubCUTAneous Q8H     glucose, 4 tablet, PRN  glucagon (rDNA), 1 mg, PRN  ondansetron, 4 mg, Q6H PRN  magnesium sulfate, 1,000 mg, PRN  sodium chloride flush, 10 mL, PRN  dextrose bolus, 125 mL, PRN   Or  dextrose bolus, 250 mL, PRN  polyethylene glycol, 17 g, Daily PRN  dextrose 5 % and 0.45 % NaCl, , Continuous PRN  medicated lip balm, , PRN    Objective:    /82   Pulse 80   Temp 98.6 °F (37 °C) (Oral)   Resp 16   Ht 5' 3\" (1.6 m)   Wt 162 lb (73.5 kg)   SpO2 98%   BMI 28.70 kg/m²     General Appearance: alert and oriented to person, place and time and in no acute distress  Skin: warm and dry  Head: normocephalic and atraumatic  Eyes: pupils equal, round, and reactive to light, extraocular eye movements intact, conjunctivae normal  Neck: neck supple and non tender without mass   Pulmonary/Chest: clear to auscultation bilaterally- no wheezes, rales or rhonchi, normal air movement, no respiratory distress  Cardiovascular: normal rate, normal S1 and S2 and no carotid bruits  Abdomen: soft, non-tender, non-distended, normal bowel sounds, no masses or organomegaly  Extremities: no cyanosis, no clubbing and no edema  Neurologic: no cranial nerve deficit and speech normal    Recent Labs     10/20/22  0650 10/21/22  0327 10/22/22  0448    135 137   K 4.1 3.5 3.1*   * 103 103   CO2 23 21* 25   BUN 24* 15 12   CREATININE 0.9 0.8 0.7   GLUCOSE 236* 257* 142*   CALCIUM 8.8 9.0 8.8       Recent Labs     10/20/22  0650 10/21/22  0327 10/22/22  0448   WBC 10.5 9.9 5.7   RBC 4.70 4.64 4.55   HGB 13.9 13.7 13.4   HCT 39.1 39.0 37.0   MCV 83.2 84.1 81.3   MCH 29.6 29.5 29.5   MCHC 35.5* 35.1* 36.2*   RDW 11.7 11.6 11.3*    174 156   MPV 10.9 11.3 11.0     Radiology: No results found. Assessment:    Principal Problem:    DKA, type 2 ([facility]  Active Problems:    Diabetic ketoacidosis without coma associated with type 1 diabetes mellitus ([facility] [address]    AMS (altered mental status)    Hypercalcemia    Elevated lactic acid level    Hyperlipidemia    Newly diagnosed diabetes ([facility] [address]    Dietary noncompliance    Primary hypertension    Seizure ([facility] [address]  Resolved Problems:    * No resolved hospital problems. *    Plan:  1. New onset DM - patient with Mental Delay. Will be staying with Mom. Sister will administer insulin shots. Diabetic education. ADA diet.  -237.  2.    Hypercalcemia - Ca2+ 9.0. Trend labs and treat accordingly. 3.    HTN - monitor BP. Continue meds. 4.    SMITHA - resolved. Avoid nephrotoxins. Trend labs and treat accordingly    Total care time:  20 minutes    ADDENDUM:  notified by nursing 1/4 BC +ve for GPC. Most likely contaminant. Clinically no signs of infection, inflammation or sepsis. NOTE: This report was transcribed using voice recognition software. Every effort was made to ensure accuracy; however, inadvertent computerized transcription errors may be present.     Electronically signed by Patricio Hand MD on 10/22/2022 at 2:06 PM

## 2022-10-22 NOTE — PROGRESS NOTES
ENDOCRINOLOGY PROGRESS NOTE      Date of admission: 10/19/2022  Date of service: 10/22/2022  Admitting physician: Kal Kaba DO   Primary Care Physician: Jj Mcmullen MD  Consultant physician: Helen Meadows MD     Reason for the consultation:  Uncontrolled DM    History of Present Illness: The history is provided by the patient's sister     Floyce Siemens is a very pleasant 42 y.o. old male with PMH of HTN, hyperTG and other listed below admitted to [facility] on 10/19/2022 because of polyuria, polydipsia, urine incontinence and found to be in DKA, endocrine service was consulted for diabetes management. Subjective   Seen and examined, no acute events, BG improving     Inpatient diet:   Carb Restricted diet     Point of care glucose monitoring   (Independently reviewed)   Recent Labs     10/20/22  1101 10/20/22  1233 10/20/22  1559 10/20/22  2201 10/21/22  0632 10/21/22  1058 10/21/22  1556 10/21/22  2021   GLUMET 187* 250* 306* 280* 450* 338* 237* 201*     Scheduled Meds:   insulin glargine  30 Units SubCUTAneous QAM    insulin lispro  10 Units SubCUTAneous TID WC    insulin lispro  0-4 Units SubCUTAneous Nightly    insulin lispro  0-12 Units SubCUTAneous TID WC    heparin (porcine)  5,000 Units SubCUTAneous Q8H       PRN Meds:   glucose, 4 tablet, PRN  glucagon (rDNA), 1 mg, PRN  ondansetron, 4 mg, Q6H PRN  potassium chloride, 10 mEq, PRN  magnesium sulfate, 1,000 mg, PRN  sodium phosphate IVPB, 10 mmol, PRN   Or  sodium phosphate IVPB, 15 mmol, PRN   Or  sodium phosphate IVPB, 20 mmol, PRN  sodium chloride flush, 10 mL, PRN  dextrose bolus, 125 mL, PRN   Or  dextrose bolus, 250 mL, PRN  polyethylene glycol, 17 g, Daily PRN  dextrose 5 % and 0.45 % NaCl, , Continuous PRN  medicated lip balm, , PRN    Continuous Infusions:   dextrose 5 % and 0.45 % NaCl Stopped (10/20/22 1102)       Review of Systems  All systems reviewed.  All negative except for symptoms mentioned in HPI     OBJECTIVE    BP (!) 129/90 Pulse 85   Temp 98.4 °F (36.9 °C) (Oral)   Resp 18   Ht 5' 3\" (1.6 m)   Wt 162 lb (73.5 kg)   SpO2 97%   BMI 28.70 kg/m²     Intake/Output Summary (Last 24 hours) at 10/22/2022 5563  Last data filed at 10/21/2022 1218  Gross per 24 hour   Intake --   Output 200 ml   Net -200 ml       Physical examination:  General: awake alert, oriented x3  HEENT: normocephalic non traumatic, no exophthalmos   Neck: supple, No thyroid tenderness,  Pulm: good equal air entry no added sounds  CVS: S1 + S2  Abd: soft lax, no tenderness  Skin: warm, no lesions, no rash. No open wounds, no ulcers   Neuro: CN intact, sensation decreased bilateral , muscle power normal  Psych: normal mood, and affect    Review of Laboratory Data:  I personally reviewed the following labs:   Recent Labs     10/20/22  0650 10/21/22  0327 10/22/22  0448   WBC 10.5 9.9 5.7   RBC 4.70 4.64 4.55   HGB 13.9 13.7 13.4   HCT 39.1 39.0 37.0   MCV 83.2 84.1 81.3   MCH 29.6 29.5 29.5   MCHC 35.5* 35.1* 36.2*   RDW 11.7 11.6 11.3*    174 156   MPV 10.9 11.3 11.0     Recent Labs     10/19/22  1844 10/19/22  1849 10/20/22  0650 10/21/22  0327 10/22/22  0448   *   < > 140 135 137   K 5.8*   < > 4.1 3.5 3.1*   CL 72*   < > 109* 103 103   CO2 17*   < > 23 21* 25   BUN 41*   < > 24* 15 12   CREATININE 1.5*   < > 0.9 0.8 0.7   GLUCOSE 924*   < > 236* 257* 142*   CALCIUM 13.7*   < > 8.8 9.0 8.8   PROT 10.0*  --   --   --   --    LABALBU 5.0  --   --   --   --    BILITOT 0.7  --   --   --   --    ALKPHOS 221*  --   --   --   --    AST 20  --   --   --   --    ALT 30  --   --   --   --     < > = values in this interval not displayed.      Beta-Hydroxybutyrate   Date Value Ref Range Status   10/19/2022 >4.50 (H) 0.02 - 0.27 mmol/L Final     Lab Results   Component Value Date/Time    LABA1C 11.2 10/19/2022 11:22 PM    LABA1C 5.9 01/11/2022 09:13 AM     Lab Results   Component Value Date/Time    TSH 0.186 (L) 10/19/2022 11:22 PM    T4FREE 0.92 (L) 10/19/2022 11:22 PM     Lab Results   Component Value Date/Time    LABA1C 11.2 10/19/2022 11:22 PM    GLUCOSE 142 10/22/2022 04:48 AM    MALBCR - 10/20/2022 09:30 AM    LABMICR <12.0 10/20/2022 09:30 AM    LABCREA 123 10/20/2022 09:30 AM     Lab Results   Component Value Date/Time    TRIG 245 01/22/2015 09:58 AM    HDL 68 01/22/2015 09:58 AM    LDLCALC 68 01/22/2015 09:58 AM    CHOL 185 01/22/2015 09:58 AM       Blood culture   Lab Results   Component Value Date/Time    BC 24 Hours no growth 10/19/2022 08:05 PM       Radiology:  CT Head WO Contrast   Final Result   No acute intracranial abnormality. CT ABDOMEN PELVIS W IV CONTRAST Additional Contrast? None   Final Result   1. No acute process in abdomen or pelvis. 2. Diverticulosis. XR CHEST PORTABLE   Final Result   No evidence of pneumonia or pleural effusion. Medical Records/Labs/Images review:   I personally reviewed and summarized previous records   All labs and imaging were reviewed independently     AlIsabelle Falcon [address], a 42 y.o.-old male seen today for inpatient diabetes management     Newly diagnosed Diabetes Mellitus admitted with DKA and BG of 999  A1c 11.2%   We recommend the following diabetes regimen    Lantus 30u daily in AM  Humalog 10u with meals   Medium ss   Continue glucose check with meals and at bedtime   Will titrate insulin dose based on the blood glucose trend & insulin requirement  With underlying cognitive impairment, insulin therapy will likely be challenging. Pt will be discharged to his family's house and his sister is welling to help with insulin administration  C-peptide and PANDA-65 Ab in process   Will arrange for patient to be seen in endocrinology clinic very soon after discharge     Dietary noncompliance  Education is very challenging given his cognitive impairment  Discussed with patient' family the importance of eating consistent carbohydrate meals, avoiding high glycemic index food.  Also, discussed with patient the risk and negative consequences of dietary noncompliance on blood glucose control     Interdisciplinary plan for communication with healthcare providers:   Consult recommendations were discussed with the Primary Service/Nursing staff      The above issues were reviewed with the patient who understood and agreed with the plan. Thank you for allowing us to participate in the care of this patient. Please do not hesitate to contact us with any additional questions. Ivelisse Pyle MD  Endocrinologist, [facility] and Endocrinology   [address] [place] [address]   Phone: [phone]  Fax: [phone]  --------------------------------------------  An electronic signature was used to authenticate this note.  Jose Luis Quezada MD on 10/22/2022 at 7:27 AM

## 2022-10-23 LAB
ANION GAP SERPL CALCULATED.3IONS-SCNC: 8 MMOL/L (ref 7–16)
BASOPHILS ABSOLUTE: 0.03 E9/L (ref 0–0.2)
BASOPHILS RELATIVE PERCENT: 0.6 % (ref 0–2)
BUN BLDV-MCNC: 9 MG/DL (ref 6–20)
CALCIUM SERPL-MCNC: 8.9 MG/DL (ref 8.6–10.2)
CHLORIDE BLD-SCNC: 104 MMOL/L (ref 98–107)
CO2: 24 MMOL/L (ref 22–29)
CREAT SERPL-MCNC: 0.7 MG/DL (ref 0.7–1.2)
EOSINOPHILS ABSOLUTE: 0.2 E9/L (ref 0.05–0.5)
EOSINOPHILS RELATIVE PERCENT: 4 % (ref 0–6)
GFR SERPL CREATININE-BSD FRML MDRD: >60 ML/MIN/1.73
GLUCOSE BLD-MCNC: 194 MG/DL (ref 74–99)
HCT VFR BLD CALC: 37.4 % (ref 37–54)
HEMOGLOBIN: 13.3 G/DL (ref 12.5–16.5)
IMMATURE GRANULOCYTES #: 0 E9/L
IMMATURE GRANULOCYTES %: 0 % (ref 0–5)
LYMPHOCYTES ABSOLUTE: 2.41 E9/L (ref 1.5–4)
LYMPHOCYTES RELATIVE PERCENT: 48.5 % (ref 20–42)
MAGNESIUM: 2 MG/DL (ref 1.6–2.6)
MCH RBC QN AUTO: 29.6 PG (ref 26–35)
MCHC RBC AUTO-ENTMCNC: 35.6 % (ref 32–34.5)
MCV RBC AUTO: 83.1 FL (ref 80–99.9)
METER GLUCOSE: 106 MG/DL (ref 74–99)
METER GLUCOSE: 178 MG/DL (ref 74–99)
METER GLUCOSE: 182 MG/DL (ref 74–99)
METER GLUCOSE: 183 MG/DL (ref 74–99)
METER GLUCOSE: 221 MG/DL (ref 74–99)
MONOCYTES ABSOLUTE: 0.34 E9/L (ref 0.1–0.95)
MONOCYTES RELATIVE PERCENT: 6.8 % (ref 2–12)
NEUTROPHILS ABSOLUTE: 1.99 E9/L (ref 1.8–7.3)
NEUTROPHILS RELATIVE PERCENT: 40.1 % (ref 43–80)
PDW BLD-RTO: 11.1 FL (ref 11.5–15)
PHOSPHORUS: 2.1 MG/DL (ref 2.5–4.5)
PLATELET # BLD: 207 E9/L (ref 130–450)
PMV BLD AUTO: 10.1 FL (ref 7–12)
POTASSIUM SERPL-SCNC: 3.6 MMOL/L (ref 3.5–5)
RBC # BLD: 4.5 E12/L (ref 3.8–5.8)
RETINYL PALMITATE: 0.11 MG/L (ref 0–0.1)
SODIUM BLD-SCNC: 136 MMOL/L (ref 132–146)
VITAMIN A LEVEL: 0.47 MG/L (ref 0.3–1.2)
VITAMIN A, INTERP: ABNORMAL
WBC # BLD: 5 E9/L (ref 4.5–11.5)

## 2022-10-23 PROCEDURE — 1200000000 HC SEMI PRIVATE

## 2022-10-23 PROCEDURE — 6370000000 HC RX 637 (ALT 250 FOR IP): Performed by: INTERNAL MEDICINE

## 2022-10-23 PROCEDURE — 82962 GLUCOSE BLOOD TEST: CPT

## 2022-10-23 PROCEDURE — 6370000000 HC RX 637 (ALT 250 FOR IP): Performed by: FAMILY MEDICINE

## 2022-10-23 PROCEDURE — 84100 ASSAY OF PHOSPHORUS: CPT

## 2022-10-23 PROCEDURE — 6360000002 HC RX W HCPCS: Performed by: NURSE PRACTITIONER

## 2022-10-23 PROCEDURE — 2580000003 HC RX 258: Performed by: RADIOLOGY

## 2022-10-23 PROCEDURE — 80048 BASIC METABOLIC PNL TOTAL CA: CPT

## 2022-10-23 PROCEDURE — 83735 ASSAY OF MAGNESIUM: CPT

## 2022-10-23 PROCEDURE — 36415 COLL VENOUS BLD VENIPUNCTURE: CPT

## 2022-10-23 PROCEDURE — 85025 COMPLETE CBC W/AUTO DIFF WBC: CPT

## 2022-10-23 PROCEDURE — 99233 SBSQ HOSP IP/OBS HIGH 50: CPT | Performed by: FAMILY MEDICINE

## 2022-10-23 RX ADMIN — HEPARIN SODIUM 5000 UNITS: 10000 INJECTION INTRAVENOUS; SUBCUTANEOUS at 18:37

## 2022-10-23 RX ADMIN — INSULIN GLARGINE 30 UNITS: 100 INJECTION, SOLUTION SUBCUTANEOUS at 08:35

## 2022-10-23 RX ADMIN — POTASSIUM & SODIUM PHOSPHATES POWDER PACK 280-160-250 MG 250 MG: 280-160-250 PACK at 16:42

## 2022-10-23 RX ADMIN — INSULIN LISPRO 2 UNITS: 100 INJECTION, SOLUTION INTRAVENOUS; SUBCUTANEOUS at 16:45

## 2022-10-23 RX ADMIN — SODIUM CHLORIDE, PRESERVATIVE FREE 10 ML: 5 INJECTION INTRAVENOUS at 22:19

## 2022-10-23 RX ADMIN — POTASSIUM & SODIUM PHOSPHATES POWDER PACK 280-160-250 MG 250 MG: 280-160-250 PACK at 08:38

## 2022-10-23 RX ADMIN — HEPARIN SODIUM 5000 UNITS: 10000 INJECTION INTRAVENOUS; SUBCUTANEOUS at 08:36

## 2022-10-23 RX ADMIN — POTASSIUM & SODIUM PHOSPHATES POWDER PACK 280-160-250 MG 250 MG: 280-160-250 PACK at 22:19

## 2022-10-23 RX ADMIN — INSULIN LISPRO 2 UNITS: 100 INJECTION, SOLUTION INTRAVENOUS; SUBCUTANEOUS at 08:38

## 2022-10-23 RX ADMIN — INSULIN LISPRO 10 UNITS: 100 INJECTION, SOLUTION INTRAVENOUS; SUBCUTANEOUS at 16:42

## 2022-10-23 RX ADMIN — INSULIN LISPRO 10 UNITS: 100 INJECTION, SOLUTION INTRAVENOUS; SUBCUTANEOUS at 08:34

## 2022-10-23 RX ADMIN — POTASSIUM & SODIUM PHOSPHATES POWDER PACK 280-160-250 MG 250 MG: 280-160-250 PACK at 13:44

## 2022-10-23 NOTE — PROGRESS NOTES
ENDOCRINOLOGY PROGRESS NOTE      Date of admission: 10/19/2022  Date of service: 10/23/2022  Admitting physician: Krista Dubon DO   Primary Care Physician: Yesica Lovell MD  Consultant physician: Prachi Contreras MD     Reason for the consultation:  Uncontrolled DM    History of Present Illness: The history is provided by the patient's sister     Ryan Mcwilliams is a very pleasant 42 y.o. old male with PMH of HTN, hyperTG and other listed below admitted to [facility] on 10/19/2022 because of polyuria, polydipsia, urine incontinence and found to be in DKA, endocrine service was consulted for diabetes management. Subjective   No acute events, BG improving no hypoglycemia, appetite good     Inpatient diet:   Carb Restricted diet     Point of care glucose monitoring   (Independently reviewed)   Recent Labs     10/22/22  1106 10/22/22  1558 10/22/22  2033 10/22/22  2117 10/23/22  0017 10/23/22  0646 10/23/22  1111 10/23/22  1622   GLUMET 292* 220* 69* 190* 178* 182* 106* 183*     Scheduled Meds:   potassium & sodium phosphates  1 packet Oral 4x Daily    insulin glargine  30 Units SubCUTAneous QAM    insulin lispro  10 Units SubCUTAneous TID WC    insulin lispro  0-4 Units SubCUTAneous Nightly    insulin lispro  0-12 Units SubCUTAneous TID WC    heparin (porcine)  5,000 Units SubCUTAneous Q8H       PRN Meds:   glucose, 4 tablet, PRN  glucagon (rDNA), 1 mg, PRN  ondansetron, 4 mg, Q6H PRN  magnesium sulfate, 1,000 mg, PRN  sodium chloride flush, 10 mL, PRN  dextrose bolus, 125 mL, PRN   Or  dextrose bolus, 250 mL, PRN  polyethylene glycol, 17 g, Daily PRN  dextrose 5 % and 0.45 % NaCl, , Continuous PRN  medicated lip balm, , PRN    Continuous Infusions:   dextrose 5 % and 0.45 % NaCl Stopped (10/20/22 1102)       Review of Systems  All systems reviewed.  All negative except for symptoms mentioned in HPI     OBJECTIVE    /83   Pulse 76   Temp 97.8 °F (36.6 °C) (Oral)   Resp 16   Ht 5' 3\" (1.6 m)   Wt 162 lb (73.5 kg)   SpO2 97%   BMI 28.70 kg/m²     Intake/Output Summary (Last 24 hours) at 10/23/2022 1839  Last data filed at 10/22/2022 2200  Gross per 24 hour   Intake 200 ml   Output 200 ml   Net 0 ml       Physical examination:  General: awake alert, oriented x3  HEENT: normocephalic non traumatic, no exophthalmos   Neck: supple, No thyroid tenderness,  Pulm: good equal air entry no added sounds  CVS: S1 + S2  Abd: soft lax, no tenderness  Skin: warm, no lesions, no rash.  No open wounds, no ulcers   Neuro: CN intact, sensation decreased bilateral , muscle power normal  Psych: normal mood, and affect    Review of Laboratory Data:  I personally reviewed the following labs:   Recent Labs     10/21/22  0327 10/22/22  0448 10/23/22  0345   WBC 9.9 5.7 5.0   RBC 4.64 4.55 4.50   HGB 13.7 13.4 13.3   HCT 39.0 37.0 37.4   MCV 84.1 81.3 83.1   MCH 29.5 29.5 29.6   MCHC 35.1* 36.2* 35.6*   RDW 11.6 11.3* 11.1*    156 207   MPV 11.3 11.0 10.1     Recent Labs     10/21/22  0327 10/22/22  0448 10/23/22  0345    137 136   K 3.5 3.1* 3.6    103 104   CO2 21* 25 24   BUN 15 12 9   CREATININE 0.8 0.7 0.7   GLUCOSE 257* 142* 194*   CALCIUM 9.0 8.8 8.9     Beta-Hydroxybutyrate   Date Value Ref Range Status   10/19/2022 >4.50 (H) 0.02 - 0.27 mmol/L Final     Lab Results   Component Value Date/Time    LABA1C 11.2 10/19/2022 11:22 PM    LABA1C 5.9 01/11/2022 09:13 AM     Lab Results   Component Value Date/Time    TSH 0.186 (L) 10/19/2022 11:22 PM    T4FREE 0.92 (L) 10/19/2022 11:22 PM     Lab Results   Component Value Date/Time    LABA1C 11.2 10/19/2022 11:22 PM    GLUCOSE 194 10/23/2022 03:45 AM    MALBCR - 10/20/2022 09:30 AM    LABMICR <12.0 10/20/2022 09:30 AM    LABCREA 123 10/20/2022 09:30 AM     Lab Results   Component Value Date/Time    TRIG 245 01/22/2015 09:58 AM    HDL 68 01/22/2015 09:58 AM    LDLCALC 68 01/22/2015 09:58 AM    CHOL 185 01/22/2015 09:58 AM       Blood culture   Lab Results   Component Value Date/Time    [facility]  10/19/2022 08:05 PM     24 Hours no growth  Gram stain performed from blood culture bottle media  Gram positive cocci in clusters      [facility]  10/19/2022 08:05 PM     This organism was isolated in one set. Susceptibility testing is not routinely done as this  organism frequently represents skin contamination. Additional testing can be ordered by calling the  Microbiology Department. Radiology:  CT Head WO Contrast   Final Result   No acute intracranial abnormality. CT ABDOMEN PELVIS W IV CONTRAST Additional Contrast? None   Final Result   1. No acute process in abdomen or pelvis. 2. Diverticulosis. XR CHEST PORTABLE   Final Result   No evidence of pneumonia or pleural effusion. Medical Records/Labs/Images review:   I personally reviewed and summarized previous records   All labs and imaging were reviewed independently     AlIsabelle Falcon [address], a 42 y.o.-old male seen today for inpatient diabetes management     Newly diagnosed Diabetes Mellitus admitted with DKA and BG of 999  A1c 11.2%   We recommend the following diabetes regimen    Lantus 30u daily in AM  Humalog 10u with meals   Medium ss   Continue glucose check with meals and at bedtime   Will titrate insulin dose based on the blood glucose trend & insulin requirement  With underlying cognitive impairment, insulin therapy will likely be challenging. Pt will be discharged to his family's house and his sister is welling to help with insulin administration  C-peptide and PANDA-65 Ab in process   Will arrange for patient to be seen in endocrinology clinic very soon after discharge     Dietary noncompliance  Education is very challenging given his cognitive impairment  Discussed with patient' family the importance of eating consistent carbohydrate meals, avoiding high glycemic index food.  Also, discussed with patient the risk and negative consequences of dietary noncompliance on blood glucose control Interdisciplinary plan for communication with healthcare providers:   Consult recommendations were discussed with the Primary Service/Nursing staff      The above issues were reviewed with the patient who understood and agreed with the plan. Thank you for allowing us to participate in the care of this patient. Please do not hesitate to contact us with any additional questions. Rubin Guzman MD  Endocrinologist, [facility] Diabetes Care and Endocrinology   [address] [facility] [address]   Phone: [phone]  Fax: [phone]  --------------------------------------------  An electronic signature was used to authenticate this note.  Portia Parekh MD on 10/23/2022 at 6:39 PM

## 2022-10-23 NOTE — PROGRESS NOTES
[facility] Progress Note    Admitting Date and Time: 10/19/2022  6:17 PM  Admit Dx: DKA, type 1, not at goal [facility]) [E10.10]    Subjective:  Patient is being followed for DKA, type 1, not at goal [facility]) [E10.10]   Pt feels good. ID to see due to positive BC for Staph epi, Methicillin  in 1/4 bottles. Was in ICU on admission  Per RN: Sister in room. Feels needs more education. Willing to give him shots of insulin. Reviewed blood sugars. To determine finger sticks vs.  CGM. Quite active, riding bike everywhere (his means of transportation). ROS: denies fever, chills, cp, sob, n/v, HA unless stated above.       potassium & sodium phosphates  1 packet Oral 4x Daily    insulin glargine  30 Units SubCUTAneous QAM    insulin lispro  10 Units SubCUTAneous TID WC    insulin lispro  0-4 Units SubCUTAneous Nightly    insulin lispro  0-12 Units SubCUTAneous TID WC    heparin (porcine)  5,000 Units SubCUTAneous Q8H     glucose, 4 tablet, PRN  glucagon (rDNA), 1 mg, PRN  ondansetron, 4 mg, Q6H PRN  magnesium sulfate, 1,000 mg, PRN  sodium chloride flush, 10 mL, PRN  dextrose bolus, 125 mL, PRN   Or  dextrose bolus, 250 mL, PRN  polyethylene glycol, 17 g, Daily PRN  dextrose 5 % and 0.45 % NaCl, , Continuous PRN  medicated lip balm, , PRN    Objective:    /83   Pulse 76   Temp 97.8 °F (36.6 °C) (Oral)   Resp 16   Ht 5' 3\" (1.6 m)   Wt 162 lb (73.5 kg)   SpO2 97%   BMI 28.70 kg/m²     General Appearance: alert and oriented to person, place and time and in no acute distress  Skin: warm and dry  Head: normocephalic and atraumatic  Eyes: pupils equal, round, and reactive to light, extraocular eye movements intact, conjunctivae normal  Neck: neck supple and non tender without mass   Pulmonary/Chest: clear to auscultation bilaterally- no wheezes, rales or rhonchi, normal air movement, no respiratory distress  Cardiovascular: normal rate, normal S1 and S2 and no carotid bruits  Abdomen: soft, non-tender, non-distended, normal bowel sounds, no masses or organomegaly  Extremities: no cyanosis, no clubbing and no edema  Neurologic: no cranial nerve deficit and speech normal    Recent Labs     10/21/22  0327 10/22/22  0448 10/23/22  0345    137 136   K 3.5 3.1* 3.6    103 104   CO2 21* 25 24   BUN 15 12 9   CREATININE 0.8 0.7 0.7   GLUCOSE 257* 142* 194*   CALCIUM 9.0 8.8 8.9       Recent Labs     10/21/22  0327 10/22/22  0448 10/23/22  0345   WBC 9.9 5.7 5.0   RBC 4.64 4.55 4.50   HGB 13.7 13.4 13.3   HCT 39.0 37.0 37.4   MCV 84.1 81.3 83.1   MCH 29.5 29.5 29.6   MCHC 35.1* 36.2* 35.6*   RDW 11.6 11.3* 11.1*    156 207   MPV 11.3 11.0 10.1     Radiology:   No results found. Assessment:    Principal Problem:    DKA, type 2 ([address]  Active Problems:    Diabetic ketoacidosis without coma associated with diabetes mellitus due to underlying condition ([address]    AMS (altered mental status)    Hypercalcemia    Elevated lactic acid level    Hyperlipidemia    Newly diagnosed diabetes ([facility] [address]    Dietary noncompliance    Primary hypertension    Seizure ([facility] [address]  Resolved Problems:    * No resolved hospital problems. *    Plan:  1. New onset DM - patient with Mental Delay. Will be staying with Mom. Sister will administer insulin shots. Diabetic education. ADA diet.  -292. ; before lunch 106. 2.    Hypercalcemia - Ca2+ 8.9. Trend labs and treat accordingly. 3.    HTN - monitor BP. Continue meds. 4.    SMITHA - resolved. Avoid nephrotoxins. Trend labs and treat accordingly     Total care time:  20 minutes    NOTE: This report was transcribed using voice recognition software. Every effort was made to ensure accuracy; however, inadvertent computerized transcription errors may be present.     Electronically signed by Yeimi Villalpando MD on 10/23/2022 at 1:20 PM

## 2022-10-23 NOTE — CONSULTS
[address] [place] [address] [facility]  Consultation Note     Admit Date: 10/19/2022  6:17 PM    Reason for Consult:   Positive BC. ? Contaminant    Attending Physician:  Clarisse Lo MD    HISTORY OF PRESENT ILLNESS:             The history is obtained from extensive review of available past medical records. The patient is a 42 y.o. male who is not known to the ID service. The patient was admitted to [facility] on 10/19/2022 with dizziness, nausea, vomiting. He had some abdominal discomfort. He admits to having polydipsia and polyuria. Not known to have diabetes. See that it does run in the family. He was admitted with DKA. He was transferred out of the ICU. Blood cultures done on admission are growing CoNS in 1 of 4 bottles. Past Medical History:        Diagnosis Date    Cerumen impaction     Hypertension     Seizure ([address] 9/2/2018    Questionable seizure, occurred 2018 with syncopal episode. No AED and no recurrence since. Past Surgical History:        Procedure Laterality Date    OTHER SURGICAL HISTORY Bilateral 01/29/2018    ear wax abstraction     Current Medications:   Scheduled Meds:   potassium & sodium phosphates  1 packet Oral 4x Daily    insulin glargine  30 Units SubCUTAneous QAM    insulin lispro  10 Units SubCUTAneous TID WC    insulin lispro  0-4 Units SubCUTAneous Nightly    insulin lispro  0-12 Units SubCUTAneous TID WC    heparin (porcine)  5,000 Units SubCUTAneous Q8H     Continuous Infusions:   dextrose 5 % and 0.45 % NaCl Stopped (10/20/22 1102)     PRN Meds:glucose, glucagon (rDNA), ondansetron, magnesium sulfate, sodium chloride flush, dextrose bolus **OR** dextrose bolus, polyethylene glycol, dextrose 5 % and 0.45 % NaCl, medicated lip balm    Allergies:  Patient has no known allergies.     Social History:   Social History     Socioeconomic History    Marital status:    Tobacco Use    Smoking status: Never    Smokeless tobacco: Never   Substance and Sexual Activity    Alcohol use: No     Alcohol/week: 0.0 standard drinks    Drug use: No     Social Determinants of Health     Financial Resource Strain: Low Risk     Difficulty of Paying Living Expenses: Not very hard   Food Insecurity: No Food Insecurity    Worried About Running Out of Food in the Last Year: Never true    Ran Out of Food in the Last Year: Never true      Pets: None  Travel: No  The patient lives with his family. He does not work    Family History:       Problem Relation Age of Onset    No Known Problems Mother     No Known Problems Father    . Otherwise non-pertinent to the chief complaint. REVIEW OF SYSTEMS:    Constitutional: Negative for fevers, chills, diaphoresis  Neurologic: Negative   Psychiatric: Negative  Rheumatologic: Negative   Endocrine: Negative  Hematologic: Negative  Immunologic: Negative  ENT: He wears hearing aids  Respiratory: Negative   Cardiovascular: Negative  GI: Negative  : Negative  Musculoskeletal: Negative  Skin: No rashes. PHYSICAL EXAM:    Vitals:   /83   Pulse 76   Temp 97.8 °F (36.6 °C) (Oral)   Resp 16   Ht 5' 3\" (1.6 m)   Wt 162 lb (73.5 kg)   SpO2 97%   BMI 28.70 kg/m²   Constitutional: The patient is awake, alert, and oriented. No distress. Hard of hearing. Skin: Warm and dry. No rashes were noted. HEENT: Eyes show round, and reactive pupils. No jaundice. Moist mucous membranes, no ulcerations, no thrush. Neck: Supple to movements. No lymphadenopathy. Chest: No use of accessory muscles to breathe. Symmetrical expansion. Auscultation reveals no wheezing, crackles, or rhonchi. Cardiovascular: S1 and S2 are rhythmic and regular. No murmurs appreciated. Abdomen: Positive bowel sounds to auscultation. Benign to palpation. No masses felt. No hepatosplenomegaly. Extremities: No clubbing, no cyanosis, no edema.   Lines: Peripheral.      CBC+dif:  Recent Labs     10/21/22  0327 10/22/22  0448 10/23/22  0345   WBC 9.9 5.7 5.0   HGB 13.7 13.4 13.3   HCT 39.0 37.0 37.4   MCV 84.1 81.3 83.1    156 207   NEUTROABS 6.56 2.31 1.99     No results found for: CRP   No results found for: CRPHS  No results found for: SEDRATE  Lab Results   Component Value Date    ALT 30 10/19/2022    AST 20 10/19/2022    GGT 36 10/19/2022    ALKPHOS 221 (H) 10/19/2022    BILITOT 0.7 10/19/2022     Lab Results   Component Value Date/Time     10/23/2022 03:45 AM    K 3.6 10/23/2022 03:45 AM    K 5.8 10/19/2022 06:44 PM     10/23/2022 03:45 AM    CO2 24 10/23/2022 03:45 AM    BUN 9 10/23/2022 03:45 AM    CREATININE 0.7 10/23/2022 03:45 AM    GFRAA >60 12/05/2021 08:32 PM    LABGLOM >60 10/23/2022 03:45 AM    GLUCOSE 194 10/23/2022 03:45 AM    PROT 10.0 10/19/2022 06:44 PM    LABALBU 5.0 10/19/2022 06:44 PM    CALCIUM 8.9 10/23/2022 03:45 AM    BILITOT 0.7 10/19/2022 06:44 PM    ALKPHOS 221 10/19/2022 06:44 PM    AST 20 10/19/2022 06:44 PM    ALT 30 10/19/2022 06:44 PM       Lab Results   Component Value Date/Time    PROTIME 11.3 10/10/2016 08:39 PM    INR 1.0 10/10/2016 08:39 PM       Lab Results   Component Value Date/Time    TSH 0.186 10/19/2022 11:22 PM       Lab Results   Component Value Date/Time    COLORU Yellow 10/19/2022 06:38 PM    PHUR 6.0 10/19/2022 06:38 PM    CLARITYU Clear 10/19/2022 06:38 PM    SPECGRAV 1.020 10/19/2022 06:38 PM    LEUKOCYTESUR Negative 10/19/2022 06:38 PM    UROBILINOGEN 0.2 10/19/2022 06:38 PM    BILIRUBINUR Negative 10/19/2022 06:38 PM    BLOODU Negative 10/19/2022 06:38 PM    GLUCOSEU >=1000 10/19/2022 06:38 PM       No results found for: HCO3, BE, O2SAT, PH, THGB, PCO2, PO2, TCO2  Radiology:  Noted    Microbiology:  Blood culture 10/19/2022: CoNS in 1 of 4 bottles  Urine culture: Negative  Nares screen MRSA: Negative  Respiratory panel: Negative    Assessment:  DKA  Bacteremia with CoNS.   This is clearly a contaminant    Plan:    No need to repeat blood cultures  No need to treat bacteremia  No further recommendations. ID will sign off    Thank you for having us see this patient in consultation. I will be discussing this case with the treating physicians.     Shaista Gallardo MD  3:10 PM  10/23/2022

## 2022-10-23 NOTE — PLAN OF CARE
Problem: Discharge Planning  Goal: Discharge to home or other facility with appropriate resources  Outcome: Progressing     Problem: Skin/Tissue Integrity  Goal: Absence of new skin breakdown  Description: 1. Monitor for areas of redness and/or skin breakdown  2. Assess vascular access sites hourly  3. Every 4-6 hours minimum:  Change oxygen saturation probe site  4. Every 4-6 hours:  If on nasal continuous positive airway pressure, respiratory therapy assess nares and determine need for appliance change or resting period.   Outcome: Progressing     Problem: Safety - Adult  Goal: Free from fall injury  Outcome: Progressing  Flowsheets (Taken 10/22/2022 2100)  Free From Fall Injury: Instruct family/caregiver on patient safety     Problem: Pain  Goal: Verbalizes/displays adequate comfort level or baseline comfort level  Outcome: Progressing  Flowsheets (Taken 10/22/2022 2015)  Verbalizes/displays adequate comfort level or baseline comfort level:   Encourage patient to monitor pain and request assistance   Assess pain using appropriate pain scale

## 2022-10-24 VITALS
HEIGHT: 63 IN | HEART RATE: 73 BPM | SYSTOLIC BLOOD PRESSURE: 116 MMHG | WEIGHT: 171.38 LBS | OXYGEN SATURATION: 97 % | BODY MASS INDEX: 30.37 KG/M2 | RESPIRATION RATE: 16 BRPM | TEMPERATURE: 98.7 F | DIASTOLIC BLOOD PRESSURE: 98 MMHG

## 2022-10-24 LAB
ANION GAP SERPL CALCULATED.3IONS-SCNC: 4 MMOL/L (ref 7–16)
BASOPHILS ABSOLUTE: 0.03 E9/L (ref 0–0.2)
BASOPHILS RELATIVE PERCENT: 0.6 % (ref 0–2)
BUN BLDV-MCNC: 9 MG/DL (ref 6–20)
CALCIUM SERPL-MCNC: 8.7 MG/DL (ref 8.6–10.2)
CHLORIDE BLD-SCNC: 103 MMOL/L (ref 98–107)
CO2: 27 MMOL/L (ref 22–29)
CREAT SERPL-MCNC: 0.8 MG/DL (ref 0.7–1.2)
CULTURE, BLOOD 2: NORMAL
EOSINOPHILS ABSOLUTE: 0.15 E9/L (ref 0.05–0.5)
EOSINOPHILS RELATIVE PERCENT: 2.9 % (ref 0–6)
GFR SERPL CREATININE-BSD FRML MDRD: >60 ML/MIN/1.73
GLUCOSE BLD-MCNC: 236 MG/DL (ref 74–99)
HCT VFR BLD CALC: 38 % (ref 37–54)
HEMOGLOBIN: 13.4 G/DL (ref 12.5–16.5)
IMMATURE GRANULOCYTES #: 0.01 E9/L
IMMATURE GRANULOCYTES %: 0.2 % (ref 0–5)
LYMPHOCYTES ABSOLUTE: 2.78 E9/L (ref 1.5–4)
LYMPHOCYTES RELATIVE PERCENT: 53.1 % (ref 20–42)
MAGNESIUM: 1.9 MG/DL (ref 1.6–2.6)
MCH RBC QN AUTO: 29.1 PG (ref 26–35)
MCHC RBC AUTO-ENTMCNC: 35.3 % (ref 32–34.5)
MCV RBC AUTO: 82.6 FL (ref 80–99.9)
METER GLUCOSE: 154 MG/DL (ref 74–99)
METER GLUCOSE: 215 MG/DL (ref 74–99)
METER GLUCOSE: 279 MG/DL (ref 74–99)
MONOCYTES ABSOLUTE: 0.32 E9/L (ref 0.1–0.95)
MONOCYTES RELATIVE PERCENT: 6.1 % (ref 2–12)
NEUTROPHILS ABSOLUTE: 1.95 E9/L (ref 1.8–7.3)
NEUTROPHILS RELATIVE PERCENT: 37.1 % (ref 43–80)
PDW BLD-RTO: 11.3 FL (ref 11.5–15)
PHOSPHORUS: 2.3 MG/DL (ref 2.5–4.5)
PLATELET # BLD: 206 E9/L (ref 130–450)
PMV BLD AUTO: 10.3 FL (ref 7–12)
POTASSIUM SERPL-SCNC: 3.7 MMOL/L (ref 3.5–5)
RBC # BLD: 4.6 E12/L (ref 3.8–5.8)
SODIUM BLD-SCNC: 134 MMOL/L (ref 132–146)
WBC # BLD: 5.2 E9/L (ref 4.5–11.5)

## 2022-10-24 PROCEDURE — 85025 COMPLETE CBC W/AUTO DIFF WBC: CPT

## 2022-10-24 PROCEDURE — 99239 HOSP IP/OBS DSCHRG MGMT >30: CPT | Performed by: INTERNAL MEDICINE

## 2022-10-24 PROCEDURE — 83735 ASSAY OF MAGNESIUM: CPT

## 2022-10-24 PROCEDURE — 82962 GLUCOSE BLOOD TEST: CPT

## 2022-10-24 PROCEDURE — 6370000000 HC RX 637 (ALT 250 FOR IP): Performed by: FAMILY MEDICINE

## 2022-10-24 PROCEDURE — 6370000000 HC RX 637 (ALT 250 FOR IP): Performed by: INTERNAL MEDICINE

## 2022-10-24 PROCEDURE — 36415 COLL VENOUS BLD VENIPUNCTURE: CPT

## 2022-10-24 PROCEDURE — 6360000002 HC RX W HCPCS: Performed by: NURSE PRACTITIONER

## 2022-10-24 PROCEDURE — 84100 ASSAY OF PHOSPHORUS: CPT

## 2022-10-24 PROCEDURE — 80048 BASIC METABOLIC PNL TOTAL CA: CPT

## 2022-10-24 RX ORDER — INSULIN LISPRO 100 [IU]/ML
12 INJECTION, SOLUTION INTRAVENOUS; SUBCUTANEOUS
Status: DISCONTINUED | OUTPATIENT
Start: 2022-10-24 | End: 2022-10-24 | Stop reason: HOSPADM

## 2022-10-24 RX ORDER — INSULIN GLARGINE 100 [IU]/ML
36 INJECTION, SOLUTION SUBCUTANEOUS EVERY MORNING
Status: DISCONTINUED | OUTPATIENT
Start: 2022-10-25 | End: 2022-10-24 | Stop reason: HOSPADM

## 2022-10-24 RX ORDER — INSULIN GLARGINE 100 [IU]/ML
36 INJECTION, SOLUTION SUBCUTANEOUS EVERY MORNING
Qty: 5 ADJUSTABLE DOSE PRE-FILLED PEN SYRINGE | Refills: 5 | Status: SHIPPED | OUTPATIENT
Start: 2022-10-24 | End: 2022-11-02

## 2022-10-24 RX ORDER — GLUCOSAMINE HCL/CHONDROITIN SU 500-400 MG
CAPSULE ORAL
Qty: 250 STRIP | Refills: 5 | Status: SHIPPED | OUTPATIENT
Start: 2022-10-24

## 2022-10-24 RX ORDER — INSULIN LISPRO 100 [IU]/ML
INJECTION, SOLUTION INTRAVENOUS; SUBCUTANEOUS
Qty: 10 ADJUSTABLE DOSE PRE-FILLED PEN SYRINGE | Refills: 3 | Status: SHIPPED | OUTPATIENT
Start: 2022-10-24 | End: 2022-11-02

## 2022-10-24 RX ORDER — PEN NEEDLE, DIABETIC 32 GX 1/4"
NEEDLE, DISPOSABLE MISCELLANEOUS
Qty: 250 EACH | Refills: 5 | Status: SHIPPED | OUTPATIENT
Start: 2022-10-24 | End: 2022-11-02

## 2022-10-24 RX ORDER — LANCETS
EACH MISCELLANEOUS
Qty: 250 EACH | Refills: 5 | Status: SHIPPED | OUTPATIENT
Start: 2022-10-24

## 2022-10-24 RX ADMIN — POTASSIUM & SODIUM PHOSPHATES POWDER PACK 280-160-250 MG 250 MG: 280-160-250 PACK at 08:12

## 2022-10-24 RX ADMIN — INSULIN GLARGINE 30 UNITS: 100 INJECTION, SOLUTION SUBCUTANEOUS at 08:14

## 2022-10-24 RX ADMIN — INSULIN LISPRO 6 UNITS: 100 INJECTION, SOLUTION INTRAVENOUS; SUBCUTANEOUS at 12:26

## 2022-10-24 RX ADMIN — HEPARIN SODIUM 5000 UNITS: 10000 INJECTION INTRAVENOUS; SUBCUTANEOUS at 08:14

## 2022-10-24 RX ADMIN — INSULIN LISPRO 10 UNITS: 100 INJECTION, SOLUTION INTRAVENOUS; SUBCUTANEOUS at 08:16

## 2022-10-24 RX ADMIN — INSULIN LISPRO 12 UNITS: 100 INJECTION, SOLUTION INTRAVENOUS; SUBCUTANEOUS at 17:09

## 2022-10-24 RX ADMIN — POTASSIUM & SODIUM PHOSPHATES POWDER PACK 280-160-250 MG 250 MG: 280-160-250 PACK at 12:23

## 2022-10-24 RX ADMIN — INSULIN LISPRO 2 UNITS: 100 INJECTION, SOLUTION INTRAVENOUS; SUBCUTANEOUS at 17:06

## 2022-10-24 RX ADMIN — HEPARIN SODIUM 5000 UNITS: 10000 INJECTION INTRAVENOUS; SUBCUTANEOUS at 01:13

## 2022-10-24 RX ADMIN — INSULIN LISPRO 4 UNITS: 100 INJECTION, SOLUTION INTRAVENOUS; SUBCUTANEOUS at 08:13

## 2022-10-24 RX ADMIN — INSULIN LISPRO 10 UNITS: 100 INJECTION, SOLUTION INTRAVENOUS; SUBCUTANEOUS at 12:23

## 2022-10-24 NOTE — CARE COORDINATION
Social Work:    Social service and RN Kelly updated patient's mother Kev Lu and sister Bridger Sequeira about discharge home today. Bridger Sequeira went over some questions she had with LYUDMILA Mendoza related to her diabetic ed class and feels comfortable taking care of patient. Social work advised Kev Lu and Bridger Sequeira that [facility] will be starting care on Thursday unless a cancellation occurs prior. Social work left a voice message with [facility] advising of discharge home today and start of care Thursday, as well as requested [facility] move patient up sooner if they have any openings. [facility] number was placed on discharge instruction. Social work to contact [facility] to update patient's  and request they follow-up with Christie Voss to assist with future planning and needs.     Electronically signed by DANIEL Castillo on 10/24/2022 at 2:41 PM

## 2022-10-24 NOTE — PROGRESS NOTES
Discharge instructions given to patient, his mom, and sister all questions answered to the best of this nurses ability. Extensive teaching using the teach back method.

## 2022-10-24 NOTE — PROGRESS NOTES
Physician Progress Note      PATIENT:               Gwynn Epley  CSN #:                  [id number]  :                       1985  ADMIT DATE:       10/19/2022 6:17 PM  [address] [place] DATE:  Erick Walker  PROVIDER #:        Karthik Ely MD          QUERY TEXT:    Dr. Ryan Kidd,    Patient admitted with DKA and noted to have AMS. If possible, please document   in the progress notes and discharge summary if you are evaluating and / or   treating any of the following: The medical record reflects the following:  Risk Factors: new onset DM with DKA, hx of underlying cognitive impairment  Clinical Indicators: per Endocrinology \"With underlying cognitive impairment,   insulin therapy will likely be challenging\" the H&P states \" History is   somewhat limited by patient's confusion and altered mental status. Vaughn Salgado \"  Treatment: IVFs, IV/SQ insulin    Thank you,  Alyce Joseph RN  Clinical Documentation Improvement  [email or web address]  Options provided:  -- Metabolic encephalopathy  -- Altered mental status due to underlying cognitive impairment  -- Other - I will add my own diagnosis  -- Disagree - Not applicable / Not valid  -- Disagree - Clinically unable to determine / Unknown  -- Refer to Clinical Documentation Reviewer    PROVIDER RESPONSE TEXT:    AMS 2/2 metabolic encephalopathy (DKA) superimposed on underlying cognitive   impairment    Query created by: Leigh Santos on 10/24/2022 9:54 AM      Electronically signed by:   Karthik Ely MD 10/24/2022 10:26 AM

## 2022-10-24 NOTE — DISCHARGE SUMMARY
[facility] Physician Discharge Summary       Neli Marx  [address], [address] [facility] [address]  [phone]          DPRTU NQEPR [place], [address] [facility] [address] [facility] [address]  [phone]    Follow up on 11/8/2022  at 12:45pm    Nicole Melissa MD  [address] [facility]  [phone]    Follow up in 1 week(s)  hospital follow up      Activity level: As tolerated     Dispo: home      Condition on discharge: Stable     Patient ID:  Hoda Loza  [id number]  42 y.o.  1985    Admit date: 10/19/2022    Discharge date and time:  10/24/2022  3:02 PM    Admission Diagnoses: Principal Problem:    DKA, type 2 ([address]  Active Problems:    Diabetic ketoacidosis without coma associated with diabetes mellitus due to underlying condition ([address]    AMS (altered mental status)    Hypercalcemia    Elevated lactic acid level    Hyperlipidemia    Newly diagnosed diabetes ([address]    Dietary noncompliance    Primary hypertension    Seizure ([address]  Resolved Problems:    * No resolved hospital problems. *      Discharge Diagnoses: Principal Problem:    DKA, type 2 ([address]  Active Problems:    Diabetic ketoacidosis without coma associated with diabetes mellitus due to underlying condition ([address]    AMS (altered mental status)    Hypercalcemia    Elevated lactic acid level    Hyperlipidemia    Newly diagnosed diabetes ([address]    Dietary noncompliance    Primary hypertension    Seizure ([address]  Resolved Problems:    * No resolved hospital problems.  *      Consults:  IP CONSULT TO DIABETES EDUCATOR  IP CONSULT TO INTERNAL MEDICINE  IP CONSULT TO ENDOCRINOLOGY  IP CONSULT TO DIETITIAN  IP CONSULT TO CASE MANAGEMENT  IP CONSULT TO INFECTIOUS DISEASES  IP CONSULT TO HOME CARE NEEDS    Procedures: none    Hospital Course:   Patient Hoda Loza is a 42 y.o. presented with DKA, type 1, not at goal [facility]) [E10.10]  Patient is a 38-year-old male who was admitted on 10/19 due to abdominal pain, nausea vomiting, polyuria and polydipsia. He was found to be in DKA in the ED with new onset diabetes and was admitted to the ICU for further management. Noted 1 out of 4 bottles of blood cultures positive for cons, ID was consulted and said this was clearly a contaminant. Noted to have an SMITHA which resolved with IV fluids. A1c was 11.2. Endocrinology was consulted and started patient on Lantus, Premeal insulin and SSI. C-peptide and PANDA 65 antibodies are still pending. Patient will need insulin on discharge but education on how to use as very challenging due to cognitive impairment so he will be discharged to his sister's house who is willing to help with insulin administration. Patient will be discharged in stable condition to follow-up with endocrinology in 2 weeks. Discharge Exam:    General Appearance: alert and oriented to person, place and time and in no acute distress  Skin: warm and dry  Head: normocephalic and atraumatic  Eyes: pupils equal, round, and reactive to light, extraocular eye movements intact, conjunctivae normal  Neck: neck supple and non tender without mass   Pulmonary/Chest: clear to auscultation bilaterally- no wheezes, rales or rhonchi, normal air movement, no respiratory distress  Cardiovascular: normal rate, normal S1 and S2 and no carotid bruits  Abdomen: soft, non-tender, non-distended, normal bowel sounds, no masses or organomegaly  Extremities: no cyanosis, no clubbing and no edema  Neurologic: no cranial nerve deficit and speech normal    I/O last 3 completed shifts: In: 200 [P.O.:200]  Out: 200 [Urine:200]  No intake/output data recorded.       LABS:  Recent Labs     10/22/22  0448 10/23/22  0345 10/24/22  0231    136 134   K 3.1* 3.6 3.7    104 103   CO2 25 24 27   BUN 12 9 9   CREATININE 0.7 0.7 0.8   GLUCOSE 142* 194* 236*   CALCIUM 8.8 8.9 8.7       Recent Labs     10/22/22  0448 10/23/22  0345 10/24/22  0231   WBC 5.7 5.0 5.2   RBC 4.55 4. 50 4.60   HGB 13.4 13.3 13.4   HCT 37.0 37.4 38.0   MCV 81.3 83.1 82.6   MCH 29.5 29.6 29.1   MCHC 36.2* 35.6* 35.3*   RDW 11.3* 11.1* 11.3*    207 206   MPV 11.0 10.1 10.3       No results for input(s): POCGLU in the last 72 hours. Imaging:  CT Head WO Contrast    Result Date: 10/19/2022  EXAMINATION: CT OF THE HEAD WITHOUT CONTRAST  10/19/2022 8:27 pm TECHNIQUE: CT of the head was performed without the administration of intravenous contrast. Automated exposure control, iterative reconstruction, and/or weight based adjustment of the mA/kV was utilized to reduce the radiation dose to as low as reasonably achievable. COMPARISON: None. HISTORY: ORDERING SYSTEM PROVIDED HISTORY: Dizziness TECHNOLOGIST PROVIDED HISTORY: Reason for exam:->Dizziness Has a \"code stroke\" or \"stroke alert\" been called? ->No Decision Support Exception - unselect if not a suspected or confirmed emergency medical condition->Emergency Medical Condition (MA) FINDINGS: BRAIN/VENTRICLES: There is no acute intracranial hemorrhage, mass effect or midline shift. No abnormal extra-axial fluid collection. The gray-white differentiation is maintained without evidence of an acute infarct. There is no evidence of hydrocephalus. ORBITS: The visualized portion of the orbits demonstrate no acute abnormality. SINUSES: The visualized paranasal sinuses and mastoid air cells demonstrate no acute abnormality. SOFT TISSUES/SKULL:  No acute abnormality of the visualized skull or soft tissues. No acute intracranial abnormality. CT ABDOMEN PELVIS W IV CONTRAST Additional Contrast? None    Result Date: 10/19/2022  EXAMINATION: CT OF THE ABDOMEN AND PELVIS WITH CONTRAST 10/19/2022 8:27 pm TECHNIQUE: CT of the abdomen and pelvis was performed with the administration of intravenous contrast. Multiplanar reformatted images are provided for review.  Automated exposure control, iterative reconstruction, and/or weight based adjustment of the mA/kV was utilized to reduce the radiation dose to as low as reasonably achievable. COMPARISON: November 26, 2019 HISTORY: ORDERING SYSTEM PROVIDED HISTORY: N/V, abd pain; new hyperglycemia; concern for acute process TECHNOLOGIST PROVIDED HISTORY: Additional Contrast?->None Reason for exam:->N/V, abd pain; new hyperglycemia; concern for acute process Decision Support Exception - unselect if not a suspected or confirmed emergency medical condition->Emergency Medical Condition (MA) FINDINGS: There is no bowel obstruction, free air, or free fluid in the abdomen or pelvis. Few diverticula involving colon at level of the splenic flexure and distal left colon. The appendix is visualized in the right lower quadrant and appears unremarkable. There is no intrahepatic or extrahepatic bile duct dilatation. Gallbladder is unremarkable. No evidence of acute pancreatitis. Spleen is nonenlarged. Adrenal glands are unremarkable. There is normal attenuation to both kidneys. No hydronephrosis. No perinephric fat stranding. No retroperitoneal lymphadenopathy. Urinary bladder is normal in contour. No evidence of pneumonia in lung bases. Facet arthropathy on the right at L5/S1. Scoliosis of thoracic and lumbar spine. 1. No acute process in abdomen or pelvis. 2. Diverticulosis. XR CHEST PORTABLE    Result Date: 10/19/2022  EXAMINATION: ONE XRAY VIEW OF THE CHEST 10/19/2022 6:57 pm COMPARISON: December 5, 2021 HISTORY: ORDERING SYSTEM PROVIDED HISTORY:  TECHNOLOGIST PROVIDED HISTORY: Reason for exam:->CP FINDINGS: Redemonstration of scoliosis involving the thoracic spine. No airspace opacity or pleural effusion. The heart is normal size. No pneumothorax. No evidence of pneumonia or pleural effusion.        Patient Instructions:      Medication List        START taking these medications      BD Pen Needle Micro U/F 32G X 6 MM Misc  Generic drug: Insulin Pen Needle  Uses with insulin 4 times a day     Blood Glucose Monitoring Suppl Misc  OneTouch ultra Glucometer     blood glucose test strips  One-Touch Ultra strips. Check 4 times/day before meals and at bedtime and as needed for symptoms of irregular blood glucose     insulin lispro (1 Unit Dial) 100 UNIT/ML Sopn  Commonly known as: HumaLOG KwikPen  Inject 12 units with meals + following sliding scale. -200 add 2U, -250 add 4U, -300 add 6U, -350 add 8U, -400 add 10U, BS over 400 add 12U. MAX 50u/day     Lantus SoloStar 100 UNIT/ML injection pen  Generic drug: insulin glargine  Inject 36 Units into the skin every morning     ONE TOUCH ULTRASOFT LANCETS Misc  Test 4 times/day before meals and at bedtime and as needed for symptoms of irregular blood glucose. CONTINUE taking these medications      acetaminophen 500 MG tablet  Commonly known as: TYLENOL  Take 1 tablet by mouth 4 times daily as needed for Pain     amLODIPine 5 MG tablet  Commonly known as: NORVASC  take 1 tablet by mouth once daily     ascorbic acid 500 MG tablet  Commonly known as: VITAMIN C  Take 1 tablet by mouth 2 times daily for 7 days     Misc. Devices Kit  BP cuff - may substitute brand for insurance coverage.                Where to Get Your Medications        These medications were sent to [facility], [address] [place] [address]      Phone: [phone]   BD Pen Needle Micro U/F 32G X 6 MM Misc  Blood Glucose Monitoring Suppl Misc  blood glucose test strips  insulin lispro (1 Unit Dial) 100 UNIT/ML Sopn  Lantus SoloStar 100 UNIT/ML injection pen  ONE TOUCH ULTRASOFT LANCETS Misc           Note that more than 30 minutes was spent in preparing discharge papers, discussing discharge with patient, medication review, etc.    Signed:  Electronically signed by Evelyn Roger MD on 10/24/2022 at 3:02 PM

## 2022-10-24 NOTE — PROGRESS NOTES
CLINICAL PHARMACY NOTE: MEDS TO BEDS    Total # of Prescriptions Filled: 6   The following medications were delivered to the patient:  Lantus solostar  Strips  Lancets  Kit  Pen needles  Humalog kwikpen    Additional Documentation:

## 2022-10-25 ENCOUNTER — HOSPITAL ENCOUNTER (OUTPATIENT)
Age: 37
Discharge: HOME | End: 2022-10-25
Payer: MEDICARE

## 2022-10-25 ENCOUNTER — CARE COORDINATION (OUTPATIENT)
Dept: CASE MANAGEMENT | Age: 37
End: 2022-10-25

## 2022-10-25 LAB
BLOOD CULTURE, ROUTINE: ABNORMAL
C-PEPTIDE: 0.9 NG/ML (ref 0.5–3.3)
ORGANISM: ABNORMAL

## 2022-10-25 PROCEDURE — V5266 BATTERY FOR HEARING DEVICE: HCPCS | Performed by: AUDIOLOGIST

## 2022-10-25 NOTE — CARE COORDINATION
[facility] Care Transitions Initial Follow Up Call    Call within 2 business days of discharge: Yes      Patient: Camille Crouch Patient : 1985   MRN: [id number]  Reason for Admission: Diabetic ketoacidosis without coma associated with type 1 diabetes mellitus   Discharge Date: 10/24/22 RARS: Readmission Risk Score: 6.4      Last Discharge  Street       Date Complaint Diagnosis Description Type Department Provider    10/19/22 Dizziness; Nausea; Fatigue Diabetic ketoacidosis without coma associated with type 1 diabetes mellitus ([facility] . .. ED to Hosp-Admission (Discharged) (ADMITTED) MARIBEL Fermin MD; Shirley Montez. .. Attempted to reach the patient for initial Care Transition call post hospital discharge. Pt's primary phone number listed is invalid/disconnected. Attempted to call pt's mother Mai Reis), who pt was discharged home with (On HIPAA), however, Alexis Webster stated that she was not able to complete the call at this time. She states that she will call back later today when able. CTN's contact information provided to Alexis Webster and she was encouraged to call back today. CTN called and spoke with Nelson Roy at [address] AT [facility] and confirmed [facility] is today, 10/25/22.         Care Transitions 24 Hour Call    Care Transitions Interventions         Follow Up  Future Appointments   Date Time Provider Neli Caceres   2022 12:45 PM Erick Uribe MD [facility]         Aydin Estrada RN

## 2022-10-25 NOTE — PROGRESS NOTES
Hearing aid battery  and billing to insurance.  Electronically signed by Brice Mccall on 10/25/2022 at 3:55 PM

## 2022-10-26 ENCOUNTER — CARE COORDINATION (OUTPATIENT)
Dept: CASE MANAGEMENT | Age: 37
End: 2022-10-26

## 2022-10-26 DIAGNOSIS — E08.10 DIABETIC KETOACIDOSIS WITHOUT COMA ASSOCIATED WITH DIABETES MELLITUS DUE TO UNDERLYING CONDITION (HCC): Primary | ICD-10-CM

## 2022-10-26 PROCEDURE — 1111F DSCHRG MED/CURRENT MED MERGE: CPT | Performed by: STUDENT IN AN ORGANIZED HEALTH CARE EDUCATION/TRAINING PROGRAM

## 2022-10-26 NOTE — CARE COORDINATION
Request from Bobby Verde RN.,  please schedule patient for hospital f/u    Patient was offered appt time on 11/1 but spoke with sister who will transport. Appt on 11/3 would be more convenient. Appt time on 11/3 @ 2pm    Mother and sister aware of appt date and time.     Liyah Zamorano, [address] [facility] Coordination Transition

## 2022-10-26 NOTE — CARE COORDINATION
[facility] Care Transitions Initial Follow Up Call    Call within 2 business days of discharge: Yes    Care Transition Nurse contacted the patient, parent, and family by telephone to perform post hospital discharge assessment. Provided introduction to self, and explanation of the Care Transition Nurse role. Patient: Bert Monahan Patient : 1985   MRN: [id number]  Reason for Admission: Diabetic ketoacidosis without coma associated with type 1 diabetes mellitus  Discharge Date: 10/24/22 RARS: Readmission Risk Score: 6.4      Last Discharge  [facility]       Date Complaint Diagnosis Description Type Department Provider    10/19/22 Dizziness; Nausea; Fatigue Diabetic ketoacidosis without coma associated with type 1 diabetes mellitus ([facility] [address] . .. ED to Hosp-Admission (Discharged) (ADMITTED) SE[facility] Corinne Jacobus, MD; Yassine Montez. .. Challenges to be reviewed by the provider   Additional needs identified to be addressed with provider: No  none               Method of communication with provider: none. Called and spoke with pt, pt's mother, and pt's sister Shari Bacon), for an initial care transition call. Noted pt has hx developmental delay and family assists with medical care. Pt is staying with his mother since discharge. Pt on speaker phone with mom and sister during call. Pt states that he is doing \"good\" and voiced no complaints today. Adolfo Hendrickson states that her and her mom have been assisting the pt with checking his BS trends and administering his insulins. Adoflo Hendrickson reports that pts FBS on 10/25 was 209 and FBS today was 283. Pt's BS trends ranging between 180-240s at this time. Pt/family denies any hypo/hyperglycemic episodes. Adolfo Hendrickson states that they were given numerous educational handouts on DM and is aware of worrisome s/sx of high/low BS. Noted pt did speak with the IP DM educator.  Pt/family states that they don't need any OP DM education classes at this time, as they got all the information needed during his IP stay. Full medication review completed with pt/family on phone. Pt has all rxs filled. CTN provided education on the Humalog SS and Lantus administration instructions. Provided several examples of how to administer Humalog insulin using the base unit of 12 + SS coverage. Pt/family voiced understanding and was able to teach back information given scenarios with BS trends. 1111F entered, as MH pcp. Pt is checking BS trends QID, however, pt/family aren't logging information. CTN strongly encouraged to keep a written log of BS trends and bring it to his f/u appts. They voiced understanding. CTN reviewed HFU information. Pt is scheduled with endo on 11/2/22 @ 12:45 pm. Pt/family aware. CTN reviewed need for a 7d HFU appt with his pcp. Offered assistance with scheduling and pt/family agreeable to assistance. CTN will message [facility] and request to assist with a HFU appt. Pt's sister states that she will transport pt to his appts. No preference on day/time. [facility]SS to call at [phone] to let family know appt information. CTN spoke to pt/family regarding the RPM program. Pt declined enrollment. [facility] and confirmed that he had a nurse visit yesterday. Pt/family did not voice any needs/concerns at this time. Pt agreeable to ongoing outreaches. Care Transition Nurse reviewed discharge instructions, medical action plan, and red flags with patient, parent, and family who verbalized understanding. The patient, parent, and family was given an opportunity to ask questions and does not have any further questions or concerns at this time. Were discharge instructions available to patient? Yes. Reviewed appropriate site of care based on symptoms and resources available to patient including: PCP  Specialist  Urgent care clinics  [facility]  When to call 911. The patient, parent, and family agrees to contact the PCP office for questions related to their healthcare.      Advance Care Planning: Does patient have an Advance Directive:  Health care decision maker information reviewed . Medication reconciliation was performed with patient, parent, and family, who verbalizes understanding of administration of home medications. Medications reviewed, 1111F entered: yes    Was patient discharged with a pulse oximeter? no    Non-face-to-face services provided:  Scheduled appointment with PCP-See above note  Scheduled appointment with Specialist-See below for endo appt  Obtained and reviewed discharge summary and/or continuity of care documents  Assessment and support for treatment adherence and medication management-1111F entered. Insuline education provided. Offered patient enrollment in the Remote Patient Monitoring (RPM) program for in-home monitoring: Patient declined. Care Transitions 24 Hour Call    Do you have a copy of your discharge instructions?: Yes  Do you have all of your prescriptions and are they filled?: Yes  Have you been contacted by a [address]?: No  Have you scheduled your follow up appointment?: No  Do you feel like you have everything you need to keep you well at home?: Yes  Care Transitions Interventions     Other Services: Declined (Comment: Declined RPM program 10/26/22)   Diabetes Education: Declined               Follow Up  Future Appointments   Date Time Provider Neli Caceres   11/2/2022 12:45 PM Skip Liu MD [address] Transition Nurse provided contact information. Plan for follow-up call in 5-7 days based on severity of symptoms and risk factors. Plan for next call: symptom management-Any s/sx hypo/hyperglycemia  self management-Is pt logging BS trends in notebook? Are trends WNL? follow-up appointment-Has pt scheduled or attended HFU appt with pcp?     Vivek Basurto RN

## 2022-10-26 NOTE — CARE COORDINATION
-CTN routed message to [facility] requesting assistance with appt scheduling. Noted that [facility] to call pt's family with appt info @ [phone].

## 2022-10-27 LAB — GLUTAMIC ACID DECARB AB: <5 IU/ML (ref 0–5)

## 2022-11-02 ENCOUNTER — CARE COORDINATION (OUTPATIENT)
Dept: CASE MANAGEMENT | Age: 37
End: 2022-11-02

## 2022-11-02 ENCOUNTER — OFFICE VISIT (OUTPATIENT)
Dept: ENDOCRINOLOGY | Age: 37
End: 2022-11-02
Payer: MEDICARE

## 2022-11-02 VITALS
DIASTOLIC BLOOD PRESSURE: 76 MMHG | WEIGHT: 173 LBS | BODY MASS INDEX: 30.65 KG/M2 | SYSTOLIC BLOOD PRESSURE: 133 MMHG | HEIGHT: 63 IN | HEART RATE: 84 BPM

## 2022-11-02 DIAGNOSIS — E11.65 TYPE 2 DIABETES MELLITUS WITH HYPERGLYCEMIA, UNSPECIFIED WHETHER LONG TERM INSULIN USE (HCC): Primary | ICD-10-CM

## 2022-11-02 DIAGNOSIS — R94.6 ABNORMAL THYROID FUNCTION TEST: ICD-10-CM

## 2022-11-02 PROCEDURE — G8484 FLU IMMUNIZE NO ADMIN: HCPCS | Performed by: INTERNAL MEDICINE

## 2022-11-02 PROCEDURE — G8427 DOCREV CUR MEDS BY ELIG CLIN: HCPCS | Performed by: INTERNAL MEDICINE

## 2022-11-02 PROCEDURE — 1036F TOBACCO NON-USER: CPT | Performed by: INTERNAL MEDICINE

## 2022-11-02 PROCEDURE — 3046F HEMOGLOBIN A1C LEVEL >9.0%: CPT | Performed by: INTERNAL MEDICINE

## 2022-11-02 PROCEDURE — 1111F DSCHRG MED/CURRENT MED MERGE: CPT | Performed by: INTERNAL MEDICINE

## 2022-11-02 PROCEDURE — G8417 CALC BMI ABV UP PARAM F/U: HCPCS | Performed by: INTERNAL MEDICINE

## 2022-11-02 PROCEDURE — 99214 OFFICE O/P EST MOD 30 MIN: CPT | Performed by: INTERNAL MEDICINE

## 2022-11-02 PROCEDURE — 3074F SYST BP LT 130 MM HG: CPT | Performed by: INTERNAL MEDICINE

## 2022-11-02 PROCEDURE — 2022F DILAT RTA XM EVC RTNOPTHY: CPT | Performed by: INTERNAL MEDICINE

## 2022-11-02 PROCEDURE — 3078F DIAST BP <80 MM HG: CPT | Performed by: INTERNAL MEDICINE

## 2022-11-02 RX ORDER — FLASH GLUCOSE SCANNING READER
1 EACH MISCELLANEOUS ONCE
Qty: 1 EACH | Refills: 0 | Status: SHIPPED | OUTPATIENT
Start: 2022-11-02 | End: 2022-11-02

## 2022-11-02 RX ORDER — METFORMIN HYDROCHLORIDE 500 MG/1
500 TABLET, EXTENDED RELEASE ORAL 2 TIMES DAILY WITH MEALS
Qty: 60 TABLET | Refills: 11 | Status: SHIPPED | OUTPATIENT
Start: 2022-11-02

## 2022-11-02 RX ORDER — FLASH GLUCOSE SENSOR
KIT MISCELLANEOUS
Qty: 6 EACH | Refills: 3 | Status: SHIPPED | OUTPATIENT
Start: 2022-11-02

## 2022-11-02 NOTE — PROGRESS NOTES
[address] [place] [address] Department of Endocrinology Diabetes and Metabolism   [address] [facility] [address]   Phone: [phone]  Fax: [phone]        Date of admission: (Not on file)  Date of service: 11/2/2022  Admitting physician: No admitting provider for patient encounter. Primary Care Physician: Mariann Kwon MD  Consultant physician: Rochelle Villa MD     Reason for the consultation:  Uncontrolled DM    History of Present Illness: The history is provided by the patient's sister     Shaye Torres is a very pleasant 42 y.o. old male with PMH of HTN, hyperTG and recently diagnosed DM seen today for follow up visit    The patient was recently admitted to the hospital on on 10/19/22 with polyuria, polydipsia, urine incontinence and found to be in DKA, Admission labs , AG 32, bicarb 17, lactic acid 2.7, beta hydroxy > 4.5, and UA with glycosuria and ketonuria. He was discharged on insulin and currently on Lantus 36u daily in AM, Humalog 12u with meals + medium ss     Since discharge the patient has been watchihng his diet and checking BG 3-4 times a day and most readings at goal     labs showed normal c-peptide and negative PANDA-65 Ab     Past medical history:   Past Medical History:   Diagnosis Date    Cerumen impaction     Hypertension     Seizure ([address] 9/2/2018    Questionable seizure, occurred 2018 with syncopal episode. No AED and no recurrence since. Past surgical history:  Past Surgical History:   Procedure Laterality Date    OTHER SURGICAL HISTORY Bilateral 01/29/2018    ear wax abstraction       Social history:   Tobacco:   reports that he has never smoked. He has never used smokeless tobacco.  Alcohol:   reports no history of alcohol use. Drugs:   reports no history of drug use.     Family history:    Family History   Problem Relation Age of Onset    No Known Problems Mother     No Known Problems Father        Allergy and drug reactions:   No Known Allergies    Current Outpatient Medications   Medication Sig Dispense Refill    Continuous Blood Gluc Sensor (FREESTYLE NAYELY 2 SENSOR) MISC Change sensor every 14 days 6 each 3    metFORMIN (GLUCOPHAGE-XR) 500 MG extended release tablet Take 1 tablet by mouth 2 times daily (with meals) 60 tablet 11    Blood Glucose Monitoring Suppl MISC OneTouch ultra Glucometer 1 each 0    ONE TOUCH ULTRASOFT LANCETS MISC Test 4 times/day before meals and at bedtime and as needed for symptoms of irregular blood glucose. 250 each 5    blood glucose monitor strips One-Touch Ultra strips. Check 4 times/day before meals and at bedtime and as needed for symptoms of irregular blood glucose 250 strip 5    amLODIPine (NORVASC) 5 MG tablet take 1 tablet by mouth once daily 90 tablet 1    acetaminophen (TYLENOL) 500 MG tablet Take 1 tablet by mouth 4 times daily as needed for Pain 30 tablet 1    Misc. Devices KIT BP cuff - may substitute brand for insurance coverage. 1 kit 0    ascorbic acid (VITAMIN C) 500 MG tablet Take 1 tablet by mouth 2 times daily for 7 days 14 tablet 0     No current facility-administered medications for this visit. Review of Systems  All systems reviewed. All negative except for symptoms mentioned in HPI     OBJECTIVE    /76   Pulse 84   Ht 5' 3\" (1.6 m)   Wt 173 lb (78.5 kg)   BMI 30.65 kg/m²   No intake or output data in the 24 hours ending 11/02/22 1305      Physical examination:  General: awake alert, oriented x3  HEENT: normocephalic non traumatic, no exophthalmos   Neck: supple, No thyroid tenderness,  Pulm: good equal air entry no added sounds  CVS: S1 + S2  Abd: soft lax, no tenderness  Skin: warm, no lesions, no rash.  No open wounds, no ulcers   Neuro: CN intact, sensation present  bilateral , muscle power normal  Psych: normal mood, and affect    Review of Laboratory Data:  I personally reviewed the following labs:   Lab Results   Component Value Date/Time    WBC 5.2 10/24/2022 02:31 AM RBC 4.60 10/24/2022 02:31 AM    HGB 13.4 10/24/2022 02:31 AM    HCT 38.0 10/24/2022 02:31 AM    MCV 82.6 10/24/2022 02:31 AM    MCH 29.1 10/24/2022 02:31 AM    MCHC 35.3 (H) 10/24/2022 02:31 AM    RDW 11.3 (L) 10/24/2022 02:31 AM     10/24/2022 02:31 AM    MPV 10.3 10/24/2022 02:31 AM      Lab Results   Component Value Date/Time     10/24/2022 02:31 AM    K 3.7 10/24/2022 02:31 AM    K 5.8 (H) 10/19/2022 06:44 PM    CO2 27 10/24/2022 02:31 AM    BUN 9 10/24/2022 02:31 AM    CREATININE 0.8 10/24/2022 02:31 AM    CALCIUM 8.7 10/24/2022 02:31 AM    LABGLOM >60 10/24/2022 02:31 AM    GFRAA >60 12/05/2021 08:32 PM      Lab Results   Component Value Date/Time    TSH 0.186 (L) 10/19/2022 11:22 PM    T4FREE 0.92 (L) 10/19/2022 11:22 PM     Lab Results   Component Value Date/Time    LABA1C 11.2 10/19/2022 11:22 PM    GLUCOSE 236 10/24/2022 02:31 AM    MALBCR - 10/20/2022 09:30 AM    LABMICR <12.0 10/20/2022 09:30 AM    LABCREA 123 10/20/2022 09:30 AM     Lab Results   Component Value Date/Time    LABA1C 11.2 10/19/2022 11:22 PM    LABA1C 5.9 01/11/2022 09:13 AM     Lab Results   Component Value Date/Time    TRIG 245 01/22/2015 09:58 AM    HDL 68 01/22/2015 09:58 AM    LDLCALC 68 01/22/2015 09:58 AM    CHOL 185 01/22/2015 09:58 AM       ASSESSMENT & PLAN   Isaias Head, a 42 y.o.-old male seen today for inpatient diabetes management     Newly diagnosed Diabetes Mellitus admitted with DKA and BG of 999  A1c 11.2%   Doing better since left the hospital  Will stop all insulin   Start Metformin er 500 mg BID with meals   Continue glucose check with meals and at bedtime ans send us sugar log in a wk   With underlying cognitive impairment, insulin therapy will likely be challenging    Abnormal thyroid function test   Thyroid labs during recent hospitalization showed low TSH with low FT4 consistent more with euthyroid sick syndrome   Will recheck the level in few weeks     I personally reviewed external notes from PCP and other patient's care team providers, and personally interpreted labs associated with the above diagnosis. I also ordered labs to further assess and manage the above addressed medical conditions    Return in about 6 weeks (around 12/14/2022) for DM type 2, VitD deficiency. The above issues were reviewed with the patient who understood and agreed with the plan. Thank you for allowing us to participate in the care of this patient. Please do not hesitate to contact us with any additional questions. Diagnosis Orders   1. Type 2 diabetes mellitus with hyperglycemia, unspecified whether long term insulin use (HCC)  Continuous Blood Gluc Sensor (FREESTYLE NAYELY 2 SENSOR) MISC    Continuous Blood Gluc  (FREESTYLE NAYELY 2 READER) JOSE    metFORMIN (GLUCOPHAGE-XR) 500 MG extended release tablet      2. Abnormal thyroid function test  TSH    T4, Free    Thyroid AB          John Paul Villafuerte MD  Endocrinologist, [facility] Diabetes Care and Endocrinology   [address] [place] [address], [address] [place] [address],[facility] [address]   Phone: [phone]  Fax: [phone]  --------------------------------------------  An electronic signature was used to authenticate this note.  Charlee Granado MD on 11/2/2022 at 1:05 PM

## 2022-11-02 NOTE — CARE COORDINATION
[facility] Care Transitions Follow Up Call    Care Transition Nurse contacted the parent by telephone to follow up after admission on 10/19/22. Patient: Demetria Medina  Patient : 1985   MRN: [id number]  Reason for Admission: Diabetic ketoacidosis without coma associated with type 1 diabetes mellitus  Discharge Date: 10/24/22 RARS: Readmission Risk Score: 6.4      Needs to be reviewed by the provider   Additional needs identified to be addressed with provider: Yes  Patient insulin dosing             Method of communication with provider: chart routing under high priority      -Spoke with patient's mother Merlinda Sic (on communication release of information form)  for follow up care transition call.   -Returning from today's endocrinology visit and has already obtained metformin. CTN reviewed dosage of 500 mg twice daily with meals with patient's mother. CTN inquired required insulin dosing-patient's mother states insulin was stopped and a prior authorization is to be obtained for a free style darline. CTN to verify with endocrinology regarding insulin-while no insulins are currently on med list provider EMR note from today states-will change diabetes regimen to:  Lantus 20u daily  Humalog 5u with meals   Medium ss   -CTN reminded patient's mother of need to check blood sugars with meals and nightly as per endocrine and encouraged patient's mother to log readings.  -Confirmed with Merlinda Sic PCP visit for CXMKESXI(.)  -Will continue to follow for care transitions. Addressed changes since last contact:  medications-metformin ordered by endocrinology at today's visit(22)  Completed endocrinology visit, PCP visit scheduled. Discussed follow-up appointments. If no appointment was previously scheduled, appointment scheduling offered: n/a. Is follow up appointment scheduled within 7 days of discharge?  No, PCP visit is tomorrow(11/3/22)    Follow Up  Future Appointments   Date Time Provider Neli Caceres 11/3/2022  2:00 PM Randa [facility] MD [facility]   12/14/2022 12:40 PM Mauricio Score, APRN - CNS [facility]     Non-[facility] follow up appointment(s): none      Patients top risk factors for readmission: functional cognitive ability, lack of knowledge about disease, and medical condition-DM, HTN  Interventions to address risk factors: Scheduled appointment with PCP-keep 11/3/22 visit, Scheduled appointment with Specialist-continue consistent follow up with endocrinology. , and maintain with [facility] patient enrollment in the Remote Patient Monitoring (RPM) program for in-home monitoring: declined on 10/26/22. Care Transitions Subsequent and Final Call    Subsequent and Final Calls  Do you have any ongoing symptoms?: No  Have your medications changed?: Yes  Patient Reports: see note  Do you have any questions related to your medications?: No  Do you currently have any active services?: Yes  Are you currently active with any services?: Home Health  Do you have any needs or concerns that I can assist you with?: No  Identified Barriers: Lack of Education  Care Transitions Interventions  No Identified Needs     Other Services: Declined (Comment: Declined RPM program 10/26/22)   Diabetes Education: Declined      Other Interventions:              Plan for follow-up call in 7-10 days based on severity of symptoms and risk factors.   Plan for next call: self management-blood sugars  follow-up appointment-review PCP visit  medication management-review diabetic medications  Check on status of free style Darcy Leger RN

## 2022-11-03 ENCOUNTER — TELEPHONE (OUTPATIENT)
Dept: ENDOCRINOLOGY | Age: 37
End: 2022-11-03

## 2022-11-03 ENCOUNTER — OFFICE VISIT (OUTPATIENT)
Dept: INTERNAL MEDICINE | Age: 37
End: 2022-11-03

## 2022-11-03 VITALS
HEART RATE: 81 BPM | HEIGHT: 63 IN | TEMPERATURE: 98.4 F | WEIGHT: 168.6 LBS | DIASTOLIC BLOOD PRESSURE: 61 MMHG | OXYGEN SATURATION: 96 % | SYSTOLIC BLOOD PRESSURE: 120 MMHG | RESPIRATION RATE: 20 BRPM | BODY MASS INDEX: 29.88 KG/M2

## 2022-11-03 DIAGNOSIS — Z09 HOSPITAL DISCHARGE FOLLOW-UP: ICD-10-CM

## 2022-11-03 DIAGNOSIS — E11.9 TYPE 2 DIABETES MELLITUS WITHOUT COMPLICATION, UNSPECIFIED WHETHER LONG TERM INSULIN USE (HCC): Primary | ICD-10-CM

## 2022-11-03 DIAGNOSIS — I10 ESSENTIAL HYPERTENSION: ICD-10-CM

## 2022-11-03 RX ORDER — AMLODIPINE BESYLATE 5 MG/1
TABLET ORAL
Qty: 90 TABLET | Refills: 1 | Status: CANCELLED | OUTPATIENT
Start: 2022-11-03

## 2022-11-03 RX ORDER — ACETAMINOPHEN 500 MG
500 TABLET ORAL 4 TIMES DAILY PRN
Qty: 30 TABLET | Refills: 1 | Status: CANCELLED | OUTPATIENT
Start: 2022-11-03

## 2022-11-03 ASSESSMENT — ENCOUNTER SYMPTOMS
DIARRHEA: 0
SINUS PAIN: 0
VOMITING: 0
SHORTNESS OF BREATH: 0
BACK PAIN: 0
ABDOMINAL PAIN: 0
NAUSEA: 0
CONSTIPATION: 0
COUGH: 0
BLOOD IN STOOL: 0

## 2022-11-03 NOTE — TELEPHONE ENCOUNTER
Per Dr Guzman Castro I called patient unable to reach called his mother, she stated she tried to get him to stay with her last night but he wouldn't, he cant check his blood sugar, they haven't gotten the Ogle yet at this time. I explained to her then she needs to do a finger pick till they get the Ogle, it does take time to get it. She stated he went to the doctor today and when he get back she will do, she will check his blood sugar and let us know.

## 2022-11-03 NOTE — PATIENT INSTRUCTIONS
Please continue to take metformin and check blood sugar. Please continue to take other medications as what you have done. Unfortunately, due to the restriction of insurance, we are unable to see you in the future. Please follow up with [facility] from this point.

## 2022-11-03 NOTE — PROGRESS NOTES
[facility] [facility]  [facility]    Attending Physician Statement  I have discussed the case, including pertinent history and exam findings with the resident physician. I have seen and examined the patient and the key elements of the encounter have been performed by me. I agree with the assessment, plan and orders as documented by the resident. I have reviewed all pertinent PMHx, PSHx, FamHx, SocialHx, medications, and allergies and updated history as appropriate. Patient presents for hospital follow up appointment. Recent diagnosis of DM after hospital admission for DKA, DC with lantus and premeal insulin, history of cognitive impairment and pt's sister has been given insulin for patient, in follow up with endocrinology, adding metformin was recommendation. Pt reported BG was 60-16 throughout the day (??). Pt's mother stated pt has not been taken insulin and only taking metformin 500 mg BID only since yesterday as endocrinology recommended to DC insulin in follow up visit. Pt has been also seen at [facility] for primary care, recommended to stay in only one PCP as seeing two same services is not safe nor financially permissible. In conversation, the pt has opted to have follow up at [facility]. Total time spent in communication with pt and family member including exam 50+ minutes in this visit. Remainder of medical problems as per resident note.     Kylah Roberts MD  11/3/2022 2:45 PM

## 2022-11-03 NOTE — PROGRESS NOTES
Post-Discharge Transitional Care Follow Up      Yazmin Munoz   YOB: 1985    Date of Office Visit:  11/3/2022  Date of Hospital Admission: 10/19/22  Date of Hospital Discharge: 10/24/22  Readmission Risk Score (high >=14%. Medium >=10%):Readmission Risk Score: 6.4      Care management risk score Rising risk (score 2-5) and Complex Care (Scores >=6): No Risk Score On File     Non face to face  following discharge, date last encounter closed (first attempt may have been earlier): 10/26/2022     Call initiated 2 business days of discharge: Yes     Type 2 diabetes mellitus without complication, unspecified whether long term insulin use (HCC)  Comments:  Continue metformin 500 mg BID  Continue to monitor BG at home  Discussed healthy lifestyle  Follow with Dr. Zita Toussaint hypertension  Comments: Well controlled  Continue amlodipine 5 mg daily      Medical Decision Making: moderate complexity  No follow-ups on file. On this date 11/3/2022 I have spent 30 minutes reviewing previous notes, test results and face to face with the patient discussing the diagnosis and importance of compliance with the treatment plan as well as documenting on the day of the visit. Subjective:   OSBALDO Munoz is a 42 y.o. with PMH of HTN on amlodipine 5 and seizure who was admitted 10/19 - 10/24 for new onset diabetes and DKA. He presented with abdominal pain, nausea, vomiting, polyuria and polydipsia. He was found to be in DKA and was admitted to the ICU to start on insulin infusion. A1c was 11.2. Endocrinology was consulted and transitioned patient on Lantus, Premeal insulin and SSI. C-peptide wnl, PANDA 65 antibodies negative. He was discharged on lantus 36u, lispro 12u premeal and SSI. Patient unable to administer insulin due to cognitive impairment. He was discharged to to his sister's house who is willing to help with insulin administration.  Patient had follow up visit with Dr. Destiny Nichols 11/2/22 and his regimen was changed to lantus 20u, humalog 5u premeal and MDSSI. Metformin 500 mg BID was added. Continuous glucose monitor ordered by lilly. Noted 1 out of 4 bottles of blood cultures positive for cons, ID was consulted and said this was clearly a contaminant. Noted to have an SMITHA which resolved with IV fluids, Cr 0.8 on discharge. No microalbuminuria. Patient presented to the clinic alone today. He reports feeling well. Denies fever, chills, headache, double vision, cough, chest pain, dyspnea, abdominal pain, N/V/C/D, joint pain, dysuria, change in bowel movement or urination. Called patient's mother, Tito Archer, patient's BG typically around 100 premeal, occasional runs in 130-180. No hypoglycemia. He had OP visit with Dr Richard Elizabeth yesterday and lantus and Humalog were discontinued and patient was started on metformin 500 BID. Continuous glucometer was ordered pending pre auth. Per patient's mother, patient sees primary care at [facility] on [address]. Discussed with patient, he would like to continue to follow his PCP there. He will be discharged from our clinic. Inpatient course: Discharge summary reviewed- see chart. Interval history/Current status: see above    Patient Active Problem List   Diagnosis    Primary hypertension    Seizure ([address]    Bilateral hearing loss    Excess ear wax    Diabetic ketoacidosis without coma associated with diabetes mellitus due to underlying condition ([address]    DKA, type 2 ([address]    AMS (altered mental status)    Hypercalcemia    Elevated lactic acid level    Hyperlipidemia    Newly diagnosed diabetes ([address]    Dietary noncompliance       Medications listed as ordered at the time of discharge from hospital     Medication List            Accurate as of November 3, 2022  4:07 PM. If you have any questions, ask your nurse or doctor.                 CONTINUE taking these medications      acetaminophen 500 MG tablet  Commonly known as: TYLENOL  Take 1 tablet by mouth 4 times daily as needed for Pain     amLODIPine 5 MG tablet  Commonly known as: NORVASC  take 1 tablet by mouth once daily     ascorbic acid 500 MG tablet  Commonly known as: VITAMIN C  Take 1 tablet by mouth 2 times daily for 7 days     Blood Glucose Monitoring Suppl Misc  OneTouch ultra Glucometer     blood glucose test strips  One-Touch Ultra strips. Check 4 times/day before meals and at bedtime and as needed for symptoms of irregular blood glucose     FreeStyle Renny 2 Sensor Misc  Change sensor every 14 days     metFORMIN 500 MG extended release tablet  Commonly known as: GLUCOPHAGE-XR  Take 1 tablet by mouth 2 times daily (with meals)     Misc. Devices Kit  BP cuff - may substitute brand for insurance coverage. ONE TOUCH ULTRASOFT LANCETS Misc  Test 4 times/day before meals and at bedtime and as needed for symptoms of irregular blood glucose. Medications marked \"taking\" at this time  Outpatient Medications Marked as Taking for the 11/3/22 encounter (Office Visit) with Kenyon Chavez MD   Medication Sig Dispense Refill    metFORMIN (GLUCOPHAGE-XR) 500 MG extended release tablet Take 1 tablet by mouth 2 times daily (with meals) 60 tablet 11    Blood Glucose Monitoring Suppl MISC OneTouch ultra Glucometer 1 each 0    ONE TOUCH ULTRASOFT LANCETS MISC Test 4 times/day before meals and at bedtime and as needed for symptoms of irregular blood glucose. 250 each 5    blood glucose monitor strips One-Touch Ultra strips. Check 4 times/day before meals and at bedtime and as needed for symptoms of irregular blood glucose 250 strip 5    amLODIPine (NORVASC) 5 MG tablet take 1 tablet by mouth once daily 90 tablet 1    acetaminophen (TYLENOL) 500 MG tablet Take 1 tablet by mouth 4 times daily as needed for Pain 30 tablet 1    Misc. Devices KIT BP cuff - may substitute brand for insurance coverage.  1 kit 0    ascorbic acid (VITAMIN C) 500 MG tablet Take 1 tablet by mouth 2 times daily for 7 days 14 tablet 0        Medications patient taking as of now reconciled against medications ordered at time of hospital discharge: Yes    Review of Systems   Constitutional:  Negative for chills, fatigue and fever. HENT:  Negative for congestion and sinus pain. Eyes:  Negative for visual disturbance. Respiratory:  Negative for cough and shortness of breath. Cardiovascular:  Negative for chest pain, palpitations and leg swelling. Gastrointestinal:  Negative for abdominal pain, blood in stool, constipation, diarrhea, nausea and vomiting. Endocrine: Negative for polyuria. Genitourinary:  Negative for dysuria and hematuria. Musculoskeletal:  Negative for arthralgias and back pain. Skin:  Negative for rash and wound. Neurological:  Negative for weakness, light-headedness, numbness and headaches. Hematological:  Does not bruise/bleed easily. Psychiatric/Behavioral:  Negative for dysphoric mood. Objective:    /61 (Site: Right Upper Arm, Position: Sitting, Cuff Size: Medium Adult)   Pulse 81   Temp 98.4 °F (36.9 °C) (Temporal)   Resp 20   Ht 5' 3\" (1.6 m)   Wt 168 lb 9.6 oz (76.5 kg)   SpO2 96%   BMI 29.87 kg/m²   Physical Exam  Vitals reviewed. Constitutional:       General: He is not in acute distress. Appearance: Normal appearance. HENT:      Head: Normocephalic and atraumatic. Nose: No rhinorrhea. Mouth/Throat:      Mouth: Mucous membranes are moist.      Pharynx: Oropharynx is clear. Eyes:      Pupils: Pupils are equal, round, and reactive to light. Cardiovascular:      Rate and Rhythm: Normal rate and regular rhythm. Pulses: Normal pulses. Heart sounds: Normal heart sounds. No murmur heard. No friction rub. No gallop. Pulmonary:      Effort: Pulmonary effort is normal.      Breath sounds: Normal breath sounds. No wheezing, rhonchi or rales. Abdominal:      General: Bowel sounds are normal.      Palpations: Abdomen is soft. Tenderness: There is no abdominal tenderness. Musculoskeletal:         General: No swelling, tenderness or deformity. Cervical back: Neck supple. Right lower leg: No edema. Left lower leg: No edema. Skin:     General: Skin is warm and dry. Neurological:      General: No focal deficit present. Mental Status: He is alert. Cranial Nerves: No cranial nerve deficit. Psychiatric:         Mood and Affect: Mood normal.       An electronic signature was used to authenticate this note.   --Marbin Wyatt MD

## 2022-11-04 ENCOUNTER — TELEPHONE (OUTPATIENT)
Dept: ENDOCRINOLOGY | Age: 37
End: 2022-11-04

## 2022-11-04 NOTE — TELEPHONE ENCOUNTER
I called and left a message for Светлана Pelletier. I asked that she call back and let us know how he was doing on the oral glycemic's. If his blood sugars were okay without the insulin.

## 2022-11-09 ENCOUNTER — CARE COORDINATION (OUTPATIENT)
Dept: CASE MANAGEMENT | Age: 37
End: 2022-11-09

## 2022-11-09 NOTE — CARE COORDINATION
[facility] Follow Up Call      Patient: Luigi Freeman  Patient : 1985   MRN: [id number]  Reason for Admission: DKA  Discharge Date: 10/24/22 RARS: Readmission Risk Score: 6.4      Attempted to reach the patient for subsequent Care Transition call. Message left with CTN's contact information requesting return phone call. Noted from pt's most recent pcp OV note on 11/3/22 that pt was discharged from the practice because pt is established with  another pcp at [facility] ([facility] pcp office). No further outreaches will be attempted.     If no return call by the end of today, CTN will  sign off and resolve CT episode      Follow Up  Future Appointments   Date Time Provider Neli Caceres   2022 12:40 PM MARIA TERESA Ashley - CNS BDM [facility]         Perla Donahue RN

## 2022-12-14 ENCOUNTER — OFFICE VISIT (OUTPATIENT)
Dept: ENDOCRINOLOGY | Age: 37
End: 2022-12-14
Payer: MEDICARE

## 2022-12-14 VITALS
SYSTOLIC BLOOD PRESSURE: 111 MMHG | HEIGHT: 63 IN | OXYGEN SATURATION: 99 % | HEART RATE: 80 BPM | DIASTOLIC BLOOD PRESSURE: 72 MMHG | WEIGHT: 172 LBS | BODY MASS INDEX: 30.48 KG/M2

## 2022-12-14 DIAGNOSIS — R94.6 ABNORMAL THYROID FUNCTION TEST: ICD-10-CM

## 2022-12-14 DIAGNOSIS — E11.65 TYPE 2 DIABETES MELLITUS WITH HYPERGLYCEMIA, UNSPECIFIED WHETHER LONG TERM INSULIN USE (HCC): Primary | ICD-10-CM

## 2022-12-14 LAB
T4 FREE: 1.31 NG/DL (ref 0.93–1.7)
TSH SERPL DL<=0.05 MIU/L-ACNC: 0.29 UIU/ML (ref 0.27–4.2)

## 2022-12-14 PROCEDURE — G8417 CALC BMI ABV UP PARAM F/U: HCPCS | Performed by: CLINICAL NURSE SPECIALIST

## 2022-12-14 PROCEDURE — G8484 FLU IMMUNIZE NO ADMIN: HCPCS | Performed by: CLINICAL NURSE SPECIALIST

## 2022-12-14 PROCEDURE — 3078F DIAST BP <80 MM HG: CPT | Performed by: CLINICAL NURSE SPECIALIST

## 2022-12-14 PROCEDURE — 3074F SYST BP LT 130 MM HG: CPT | Performed by: CLINICAL NURSE SPECIALIST

## 2022-12-14 PROCEDURE — 99214 OFFICE O/P EST MOD 30 MIN: CPT | Performed by: CLINICAL NURSE SPECIALIST

## 2022-12-14 PROCEDURE — 1036F TOBACCO NON-USER: CPT | Performed by: CLINICAL NURSE SPECIALIST

## 2022-12-14 PROCEDURE — 3046F HEMOGLOBIN A1C LEVEL >9.0%: CPT | Performed by: CLINICAL NURSE SPECIALIST

## 2022-12-14 PROCEDURE — G8427 DOCREV CUR MEDS BY ELIG CLIN: HCPCS | Performed by: CLINICAL NURSE SPECIALIST

## 2022-12-14 PROCEDURE — 2022F DILAT RTA XM EVC RTNOPTHY: CPT | Performed by: CLINICAL NURSE SPECIALIST

## 2022-12-14 RX ORDER — BLOOD SUGAR DIAGNOSTIC
STRIP MISCELLANEOUS
Qty: 100 EACH | Refills: 5 | Status: SHIPPED | OUTPATIENT
Start: 2022-12-14

## 2022-12-14 RX ORDER — ROSUVASTATIN CALCIUM 20 MG/1
TABLET, COATED ORAL
COMMUNITY
Start: 2022-10-19

## 2022-12-14 RX ORDER — METFORMIN HYDROCHLORIDE 500 MG/1
1000 TABLET, EXTENDED RELEASE ORAL 2 TIMES DAILY WITH MEALS
Qty: 120 TABLET | Refills: 11 | Status: SHIPPED | OUTPATIENT
Start: 2022-12-14

## 2022-12-14 RX ORDER — LANCETS 33 GAUGE
EACH MISCELLANEOUS
Qty: 100 EACH | Refills: 5 | Status: SHIPPED | OUTPATIENT
Start: 2022-12-14

## 2022-12-14 RX ORDER — BLOOD-GLUCOSE METER
EACH MISCELLANEOUS
Qty: 1 KIT | Refills: 0 | Status: SHIPPED | OUTPATIENT
Start: 2022-12-14

## 2022-12-14 NOTE — PROGRESS NOTES
[address] [place] [address] Department of Endocrinology Diabetes and Metabolism   [address] [facility] [address]   Phone: [phone]  Fax: [phone]        Date of admission: (Not on file)  Date of service: 12/14/2022  Admitting physician: No admitting provider for patient encounter. Primary Care Physician: Angy Bustos MD  Consultant physician: Kev Beth APRN - CNS      Reason for the consultation:  Uncontrolled DM    History of Present Illness: The history is provided by the patient's sister     Bienvenido Kim is a very pleasant 42 y.o. old male with PMH of HTN, hyperTG and recently diagnosed DM seen today for follow up visit    The patient was recently admitted to the hospital on on 10/19/22 with polyuria, polydipsia, urine incontinence and found to be in DKA, Admission labs , AG 32, bicarb 17, lactic acid 2.7, beta hydroxy > 4.5, and UA with glycosuria and ketonuria. Currently on: Metformin 500 mg 1 tablet BID    Nursing care comes to home twice per week   He does not check BG often     labs showed normal c-peptide and negative PANDA-65 Ab   Hemoglobin A1C   Date Value Ref Range Status   10/19/2022 11.2 (H) 4.0 - 5.6 % Final         Past medical history:   Past Medical History:   Diagnosis Date    Cerumen impaction     Hypertension     Seizure ([facility] 9/2/2018    Questionable seizure, occurred 2018 with syncopal episode. No AED and no recurrence since. Past surgical history:  Past Surgical History:   Procedure Laterality Date    OTHER SURGICAL HISTORY Bilateral 01/29/2018    ear wax abstraction       Social history:   Tobacco:   reports that he has never smoked. He has never used smokeless tobacco.  Alcohol:   reports no history of alcohol use. Drugs:   reports no history of drug use.     Family history:    Family History   Problem Relation Age of Onset    No Known Problems Mother     No Known Problems Father        Allergy and drug reactions:   No Known Allergies    Current Outpatient Medications   Medication Sig Dispense Refill    metFORMIN (GLUCOPHAGE-XR) 500 MG extended release tablet Take 2 tablets by mouth 2 times daily (with meals) 120 tablet 11    Blood Glucose Monitoring Suppl MISC OneTouch ultra Glucometer 1 each 0    blood glucose monitor strips One-Touch Ultra strips. Check 4 times/day before meals and at bedtime and as needed for symptoms of irregular blood glucose 250 strip 5    amLODIPine (NORVASC) 5 MG tablet take 1 tablet by mouth once daily 90 tablet 1    rosuvastatin (CRESTOR) 20 MG tablet take 1 tablet by mouth at bedtime      Continuous Blood Gluc Sensor (FREESTYLE NAYELY 2 SENSOR) [facility] Change sensor every 14 days (Patient not taking: Reported on 12/14/2022) 6 each 3    ONE TOUCH ULTRASOFT LANCETS MISC Test 4 times/day before meals and at bedtime and as needed for symptoms of irregular blood glucose. 250 each 5    acetaminophen (TYLENOL) 500 MG tablet Take 1 tablet by mouth 4 times daily as needed for Pain 30 tablet 1    Misc. Devices KIT BP cuff - may substitute brand for insurance coverage. 1 kit 0    ascorbic acid (VITAMIN C) 500 MG tablet Take 1 tablet by mouth 2 times daily for 7 days 14 tablet 0     No current facility-administered medications for this visit. Review of Systems  All systems reviewed. All negative except for symptoms mentioned in HPI     OBJECTIVE    /72   Pulse 80   Ht 5' 3\" (1.6 m)   Wt 172 lb (78 kg)   SpO2 99%   BMI 30.47 kg/m²   No intake or output data in the 24 hours ending 12/14/22 1319      Physical examination:  General: awake alert, oriented x3  HEENT: normocephalic non traumatic, no exophthalmos   Neck: supple, No thyroid tenderness,  Pulm: good equal air entry no added sounds  CVS: S1 + S2  Abd: soft lax, no tenderness  Skin: warm, no lesions, no rash.  No open wounds, no ulcers   Neuro: CN intact, sensation present  bilateral , muscle power normal  Psych: normal mood, and affect    Review of follow-up with ophthalmology    Abnormal thyroid function test   Thyroid labs during recent hospitalization showed low TSH with low FT4 consistent more with euthyroid sick syndrome   Will reassess TFTs today    I personally spent greater than 30 minutes reviewing external notes from PCP and other patient's care team providers, and personally interpreted labs associated with the above diagnosis. I also ordered labs to further assess and manage the above addressed medical conditions    Return in about 3 months (around 3/14/2023). The above issues were reviewed with the patient who understood and agreed with the plan. Thank you for allowing us to participate in the care of this patient. Please do not hesitate to contact us with any additional questions. Diagnosis Orders   1. Type 2 diabetes mellitus with hyperglycemia, unspecified whether long term insulin use (HCC)  metFORMIN (GLUCOPHAGE-XR) 500 MG extended release tablet      2. Abnormal thyroid function test  T4, Free    TSH            MARIA TERESA Lazcano    [facility]   [address] [place] [id number]   Phone: [phone]  Fax: [phone]  --------------------------------------------  An electronic signature was used to authenticate this note.  MARIA TERESA Lazcano  on 12/14/2022 at 1:19 PM

## 2023-03-06 ENCOUNTER — TELEPHONE (OUTPATIENT)
Dept: ENDOCRINOLOGY | Age: 38
End: 2023-03-06

## 2023-03-06 ENCOUNTER — HOSPITAL ENCOUNTER (EMERGENCY)
Age: 38
Discharge: HOME | End: 2023-03-06
Attending: EMERGENCY MEDICINE
Payer: MEDICARE

## 2023-03-06 VITALS
SYSTOLIC BLOOD PRESSURE: 152 MMHG | DIASTOLIC BLOOD PRESSURE: 89 MMHG | WEIGHT: 142 LBS | TEMPERATURE: 98.8 F | OXYGEN SATURATION: 98 % | BODY MASS INDEX: 25.16 KG/M2 | RESPIRATION RATE: 16 BRPM | HEART RATE: 82 BPM | HEIGHT: 63 IN

## 2023-03-06 DIAGNOSIS — T50.905A ADVERSE EFFECT OF DRUG, INITIAL ENCOUNTER: Primary | ICD-10-CM

## 2023-03-06 PROCEDURE — 99282 EMERGENCY DEPT VISIT SF MDM: CPT

## 2023-03-06 ASSESSMENT — LIFESTYLE VARIABLES
HOW MANY STANDARD DRINKS CONTAINING ALCOHOL DO YOU HAVE ON A TYPICAL DAY: 1 OR 2
HOW OFTEN DO YOU HAVE A DRINK CONTAINING ALCOHOL: 2-4 TIMES A MONTH

## 2023-03-06 ASSESSMENT — PAIN - FUNCTIONAL ASSESSMENT: PAIN_FUNCTIONAL_ASSESSMENT: NONE - DENIES PAIN

## 2023-03-06 NOTE — TELEPHONE ENCOUNTER
The pt's mother left a message on the clinical line stating that he's having issue with his new medication. I called her back with REZA SHABAZZ to return call,

## 2023-03-06 NOTE — ED NOTES
Discharge instructions given. Patient verbalizes understanding. No other noted or stated problems at this time. Patient will follow up with primary care.      Cristine Mireles RN  03/06/23 [id number]

## 2023-03-06 NOTE — DISCHARGE INSTRUCTIONS
Please call you primary care doctor and let them know you are having shaking spells with the metformin so that they can potentially change you medications.

## 2023-03-07 NOTE — ED PROVIDER NOTES
[facility][1] ENCOUNTER      Pt Name: Claudene Dance  MRN: [id number]  [place] 1985  Date of evaluation: 3/6/2023  Provider: Khadijah Mora MD     CHIEF COMPLAINT       Chief Complaint   Patient presents with    Diarrhea     Pt feels metformin is causing diarrhea. Wants to speak to Dr about his medication         HISTORY OF PRESENT ILLNESS   (Location/Symptom, Timing/Onset, Context/Setting, Quality, Duration, Modifying Factors, Severity) Note limiting factors. I wore a kn95 mask for the entirety of this encounter. HPI    Claudene Dance is a 34 y.o. male who presents to the emergency department shaking episodes after taking his metformin. No fevers chest pain shortness of breath abdominal pain nausea vomiting diarrhea or other complaints at this time    Nursing Notes were reviewed. REVIEW OF SYSTEMS    (2+ for level 4; 10+ for level 5)   Review of Systems  Otherwise negative  PAST MEDICAL HISTORY     Past Medical History:   Diagnosis Date    Cerumen impaction     Hypertension     Seizure ([facility] 9/2/2018    Questionable seizure, occurred 2018 with syncopal episode. No AED and no recurrence since.        SURGICAL HISTORY       Past Surgical History:   Procedure Laterality Date    OTHER SURGICAL HISTORY Bilateral 01/29/2018    ear wax abstraction       CURRENT MEDICATIONS       Discharge Medication List as of 3/6/2023 11:24 AM        CONTINUE these medications which have NOT CHANGED    Details   rosuvastatin (CRESTOR) 20 MG tablet take 1 tablet by mouth at bedtimeHistorical Med      metFORMIN (GLUCOPHAGE-XR) 500 MG extended release tablet Take 2 tablets by mouth 2 times daily (with meals), Disp-120 tablet, R-11Normal      Blood Glucose Monitoring Suppl (ONE TOUCH ULTRA 2) w/Device KIT Disp-1 kit, R-0, NormalUse to check blood glucose      !! blood glucose test strips (ONETOUCH ULTRA) strip Check blood glucose every other day, Disp-100 each, R-5Normal      !! OneTouch Delica Lancets 60I MISC Check blood glucose every other day, Disp-100 each, R-5Normal      Continuous Blood Gluc Sensor (FREESTYLE NAYELY 2 SENSOR) [facility] Change sensor every 14 days, Disp-6 each, R-3Normal      Blood Glucose Monitoring Suppl MISC Disp-1 each, R-0, NormalOneTouch ultra Glucometer      !! ONE TOUCH ULTRASOFT LANCETS MISC Disp-250 each, R-5, NormalTest 4 times/day before meals and at bedtime and as needed for symptoms of irregular blood glucose. !! blood glucose monitor strips One-Touch Ultra strips. Check 4 times/day before meals and at bedtime and as needed for symptoms of irregular blood glucose, Disp-250 strip, R-5, Normal      amLODIPine (NORVASC) 5 MG tablet take 1 tablet by mouth once daily, Disp-90 tablet, R-1Normal      acetaminophen (TYLENOL) 500 MG tablet Take 1 tablet by mouth 4 times daily as needed for Pain, Disp-30 tablet, R-1Normal      [facility]. Devices KIT Disp-1 kit, R-0, PrintBP cuff - may substitute brand for insurance coverage. ascorbic acid (VITAMIN C) 500 MG tablet Take 1 tablet by mouth 2 times daily for 7 days, Disp-14 tablet, R-0Print       !! - Potential duplicate medications found. Please discuss with provider. ALLERGIES     Patient has no known allergies.     FAMILY HISTORY       Family History   Problem Relation Age of Onset    No Known Problems Mother     No Known Problems Father         SOCIAL HISTORY       Social History     Socioeconomic History    Marital status:    Tobacco Use    Smoking status: Never    Smokeless tobacco: Never   Substance and Sexual Activity    Alcohol use: No     Alcohol/week: 0.0 standard drinks    Drug use: No       SCREENINGS    [place] Coma Scale  Eye Opening: Spontaneous  Best Verbal Response: Oriented  Best Motor Response: Obeys commands  [place] Coma Scale Score: 15      PHYSICAL EXAM    (up to 7 for level 4, 8 or more for level 5)     ED Triage Vitals [03/06/23 1054]   BP Temp Temp Source Heart Rate Resp SpO2 Height Weight   (!) 160/100 98.8 °F (37.1 °C) Oral 84 14 99 % 5' 3\" (1.6 m) 142 lb (64.4 kg)       Physical Exam  Constitutional:       General: He is not in acute distress. Appearance: He is not diaphoretic. Pulmonary:      Effort: Pulmonary effort is normal. No respiratory distress. Neurological:      Mental Status: He is alert and oriented to person, place, and time. DIAGNOSTIC RESULTS     EKG (Per Emergency Physician):       RADIOLOGY (Per Emergency Physician): Interpretation per the Radiologist below, if available at the time of this note:  No results found. ED BEDSIDE ULTRASOUND:   Performed by ED Physician - none    LABS:  Labs Reviewed - No data to display     All other labs were within normal range or not returned as of this dictation. EMERGENCY DEPARTMENT COURSE and DIFFERENTIAL DIAGNOSIS/MDM:   Vitals:    Vitals:    03/06/23 1054 03/06/23 1128   BP: (!) 160/100 (!) 152/89   Pulse: 84 82   Resp: 14 16   Temp: 98.8 °F (37.1 °C)    TempSrc: Oral    SpO2: 99% 98%   Weight: 142 lb (64.4 kg)    Height: 5' 3\" (1.6 m)        Medications - No data to display    MDM  . Patient presenting with shaking episodes. He also thinks his medications causing diarrhea. We do long discussion about potential other medications that could be used for hypoglycemia. She probably follow-up with primary care doctor however to have this investigated. Blood sugars been running good he says somewhere around 200 typically he is otherwise asymptomatic is not having fevers cough chills or any infectious symptoms is stable for discharge    REVAL:         CRITICAL CARE TIME   Total Critical Care time was 0 minutes, excluding separately reportable procedures. There was a high probability of clinically significant/life threatening deterioration in the patient's condition which required my urgent intervention.      CONSULTS:  None    PROCEDURES:  Unless otherwise noted below, none     Procedures    Patients symptoms are consistent with sepsis, severe sepsis, or septic shock (If yes use \".sepsiscoremeasure\"): no  FINAL IMPRESSION      1. Adverse effect of drug, initial encounter          DISPOSITION/PLAN   DISPOSITION Decision To Discharge 03/06/2023 11:16:39 AM      PATIENT REFERRED TO:  Donald Weston MD  [address] [place] [address] [phone]    Schedule an appointment as soon as possible for a visit in 2 days        DISCHARGE MEDICATIONS:  Discharge Medication List as of 3/6/2023 11:24 AM             (Please note:  Portions of this note were completed with a voice recognition program.  Efforts were made to edit the dictations but occasionally words and phrases are mis-transcribed.)  Form v2016. J.5-cn    Amari Schmid MD (electronically signed)  Emergency Medicine Provider       Amari Schmid MD  03/06/23 2010

## 2023-03-09 ENCOUNTER — OFFICE VISIT (OUTPATIENT)
Dept: INTERNAL MEDICINE | Age: 38
End: 2023-03-09
Payer: MEDICARE

## 2023-03-09 VITALS
RESPIRATION RATE: 20 BRPM | HEIGHT: 63 IN | DIASTOLIC BLOOD PRESSURE: 72 MMHG | WEIGHT: 164 LBS | OXYGEN SATURATION: 96 % | BODY MASS INDEX: 29.06 KG/M2 | SYSTOLIC BLOOD PRESSURE: 136 MMHG | TEMPERATURE: 97.9 F | HEART RATE: 94 BPM

## 2023-03-09 DIAGNOSIS — E11.65 TYPE 2 DIABETES MELLITUS WITH HYPERGLYCEMIA, UNSPECIFIED WHETHER LONG TERM INSULIN USE (HCC): ICD-10-CM

## 2023-03-09 DIAGNOSIS — I10 ESSENTIAL HYPERTENSION: ICD-10-CM

## 2023-03-09 DIAGNOSIS — E11.65 TYPE 2 DIABETES MELLITUS WITH HYPERGLYCEMIA, WITHOUT LONG-TERM CURRENT USE OF INSULIN (HCC): ICD-10-CM

## 2023-03-09 DIAGNOSIS — E11.9 TYPE 2 DIABETES MELLITUS WITHOUT COMPLICATION, UNSPECIFIED WHETHER LONG TERM INSULIN USE (HCC): Primary | ICD-10-CM

## 2023-03-09 DIAGNOSIS — Z13.220 SCREENING FOR HYPERLIPIDEMIA: ICD-10-CM

## 2023-03-09 LAB — HBA1C MFR BLD: 6 %

## 2023-03-09 PROCEDURE — 83036 HEMOGLOBIN GLYCOSYLATED A1C: CPT

## 2023-03-09 PROCEDURE — 99212 OFFICE O/P EST SF 10 MIN: CPT

## 2023-03-09 ASSESSMENT — PATIENT HEALTH QUESTIONNAIRE - PHQ9
1. LITTLE INTEREST OR PLEASURE IN DOING THINGS: 0
SUM OF ALL RESPONSES TO PHQ QUESTIONS 1-9: 0
SUM OF ALL RESPONSES TO PHQ9 QUESTIONS 1 & 2: 0
SUM OF ALL RESPONSES TO PHQ QUESTIONS 1-9: 0
2. FEELING DOWN, DEPRESSED OR HOPELESS: 0

## 2023-03-09 ASSESSMENT — ENCOUNTER SYMPTOMS
DIARRHEA: 0
ABDOMINAL DISTENTION: 0
STRIDOR: 0
SHORTNESS OF BREATH: 0
BLOOD IN STOOL: 0
VOMITING: 0
COUGH: 0
NAUSEA: 0

## 2023-03-09 NOTE — PROGRESS NOTES
[id number]    Attending Physician Statement  I have discussed the case, including pertinent history and exam findings with the resident physician. I agree with the assessment, plan and orders as documented by the resident. I have reviewed all pertinent PMHx, PSHx, FamHx, SocialHx, medications, and allergies and updated history as appropriate. Patient here for emergency room follow up. Patient seen with acute diarrhea in ED which has now resolved. He has not discontinued metformin during this episode. Likely with acute noninfectious diarrhea. Advised to continue taking all meds. A1c today is 6. BP is controlled. Need repeat lipid panel. Ff with PCP for other chronic issues. Remainder of medical problems as per resident note. Myrna Baptiste MD  3/9/2023 4:03 PM

## 2023-03-09 NOTE — PATIENT INSTRUCTIONS
Type 2 Diabetes  Please continue to take metformin 500mg twice daily  Your A1C today is 6.0%    Hyperlipidemia  Please continue to take Crestor 20mg daily  Please draw blood work for BMP and lipid panel    High blood pressure  Continue to take your Norvasc 5mg daily    Follow-up with your primary care physician on 4/10/2023

## 2023-03-09 NOTE — PROGRESS NOTES
[facility] Internal Medicine      SUBJECTIVE:  Grant Solis (:  1985) is a 34 y.o. male here for evaluation of the following chief complaint(s):  Follow-up, Diabetes, and Medication Problem      HPI:   Mr. Armen Palma is a 34year old male with medical history significant for essential hypertension, T2DM, who is here for a ED follow-up. Patient presented to the ED for diarrhea and shaking episodes after taking his metformin. Patient states that his stools is well formed; however, frequency has increased. He denies any fevers, chills, nausea, vomiting, hematemesis, hematochezia or melena. Denies feeling light headed. Since the ED visit, he no longer is having diarrhea and has been continuing to take his medications. Patient A1C is 6.0% today improved from 11.4% in 2022. Patient has not had lipid panel since . Key points to note:  - A1C 6.0%  - Lipid panel, BMP needed    Review of Systems   Constitutional:  Negative for chills, fever and unexpected weight change. Respiratory:  Negative for cough, shortness of breath and stridor. Cardiovascular:  Negative for chest pain and palpitations. Gastrointestinal:  Negative for abdominal distention, blood in stool, diarrhea, nausea and vomiting. PMHx:  has a past medical history of Cerumen impaction, Hypertension, and Seizure ([address] No Known Allergies     PSHx:  has a past surgical history that includes other surgical history (Bilateral, 2018).      Social Hx:   Social History       Tobacco History       Smoking Status  Never      Smokeless Tobacco Use  Never              Alcohol History       Alcohol Use Status  No              Drug Use       Drug Use Status  No              Sexual Activity       Sexually Active  Not Asked                     Fam Hx:   Family History   Problem Relation Age of Onset    No Known Problems Mother     No Known Problems Father               Current Outpatient Medications on File Prior to Visit   Medication Sig Dispense Refill    rosuvastatin (CRESTOR) 20 MG tablet take 1 tablet by mouth at bedtime      metFORMIN (GLUCOPHAGE-XR) 500 MG extended release tablet Take 2 tablets by mouth 2 times daily (with meals) 120 tablet 11    amLODIPine (NORVASC) 5 MG tablet take 1 tablet by mouth once daily 90 tablet 1    Blood Glucose Monitoring Suppl (ONE TOUCH ULTRA 2) w/Device KIT Use to check blood glucose (Patient not taking: Reported on 3/9/2023) 1 kit 0    blood glucose test strips (ONETOUCH ULTRA) strip Check blood glucose every other day (Patient not taking: Reported on 3/9/2023) 100 each 5    OneTouch Delica Lancets 18A MISC Check blood glucose every other day (Patient not taking: Reported on 3/9/2023) 100 each 5    Continuous Blood Gluc Sensor (FREESTYLE NAYELY 2 SENSOR) MISC Change sensor every 14 days (Patient not taking: No sig reported) 6 each 3    Blood Glucose Monitoring Suppl MISC OneTouch ultra Glucometer (Patient not taking: Reported on 3/9/2023) 1 each 0    ONE TOUCH ULTRASOFT LANCETS MISC Test 4 times/day before meals and at bedtime and as needed for symptoms of irregular blood glucose. (Patient not taking: Reported on 3/9/2023) 250 each 5    blood glucose monitor strips One-Touch Ultra strips. Check 4 times/day before meals and at bedtime and as needed for symptoms of irregular blood glucose (Patient not taking: Reported on 3/9/2023) 250 strip 5    acetaminophen (TYLENOL) 500 MG tablet Take 1 tablet by mouth 4 times daily as needed for Pain (Patient not taking: Reported on 3/9/2023) 30 tablet 1    Misc. Devices KIT BP cuff - may substitute brand for insurance coverage. (Patient not taking: Reported on 3/9/2023) 1 kit 0    ascorbic acid (VITAMIN C) 500 MG tablet Take 1 tablet by mouth 2 times daily for 7 days 14 tablet 0     No current facility-administered medications on file prior to visit.        OBJECTIVE:    VS:   Vitals:    03/09/23 1543   BP: 136/72 Site: Right Upper Arm   Position: Sitting   Cuff Size: Large Adult   Pulse: 94   Resp: 20   Temp: 97.9 °F (36.6 °C)   TempSrc: Temporal   SpO2: 96%   Weight: 164 lb (74.4 kg)   Height: 5' 3\" (1.6 m)     Physical Exam  Constitutional:       Appearance: Normal appearance. HENT:      Head: Normocephalic and atraumatic. Nose: Nose normal.      Mouth/Throat:      Mouth: Mucous membranes are dry. Eyes:      Extraocular Movements: Extraocular movements intact. Cardiovascular:      Rate and Rhythm: Normal rate and regular rhythm. Pulmonary:      Effort: Pulmonary effort is normal.      Breath sounds: Normal breath sounds. Abdominal:      Palpations: Abdomen is soft. Musculoskeletal:      Cervical back: Normal range of motion and neck supple. Skin:     General: Skin is warm. Neurological:      Mental Status: He is alert. ASSESSMENT/PLAN:  1. Type 2 diabetes mellitus without complication, unspecified whether long term insulin use (HCC)  -     POCT glycosylated hemoglobin (Hb A1C)  2. Type 2 diabetes mellitus with hyperglycemia, unspecified whether long term insulin use ([facility]  3. Type 2 diabetes mellitus with hyperglycemia, without long-term current use of insulin ([facility]  4. Screening for hyperlipidemia  -     LIPID PANEL; Future  -     Basic Metabolic Panel; Future  5. Essential hypertension  -     Misc. Devices MISC; Disp-1 each, R-0, NormalBP cuff - may substitute brand for insurance coverage. RTC:  Return in about 1 month (around 4/10/2023) for Follow-up with your PCP. I have reviewed my findings and recommendations with James Jeffries and Dr. Nima Capellan.     Keren Marroquin MD   3/9/2023 4:23 PM

## 2023-03-15 ENCOUNTER — OFFICE VISIT (OUTPATIENT)
Dept: ENDOCRINOLOGY | Age: 38
End: 2023-03-15
Payer: MEDICARE

## 2023-03-15 VITALS
HEIGHT: 63 IN | BODY MASS INDEX: 29.95 KG/M2 | SYSTOLIC BLOOD PRESSURE: 146 MMHG | WEIGHT: 169 LBS | DIASTOLIC BLOOD PRESSURE: 98 MMHG | RESPIRATION RATE: 16 BRPM | OXYGEN SATURATION: 99 % | HEART RATE: 58 BPM

## 2023-03-15 DIAGNOSIS — E11.65 TYPE 2 DIABETES MELLITUS WITH HYPERGLYCEMIA, UNSPECIFIED WHETHER LONG TERM INSULIN USE (HCC): ICD-10-CM

## 2023-03-15 PROCEDURE — 1036F TOBACCO NON-USER: CPT | Performed by: CLINICAL NURSE SPECIALIST

## 2023-03-15 PROCEDURE — 2022F DILAT RTA XM EVC RTNOPTHY: CPT | Performed by: CLINICAL NURSE SPECIALIST

## 2023-03-15 PROCEDURE — G8484 FLU IMMUNIZE NO ADMIN: HCPCS | Performed by: CLINICAL NURSE SPECIALIST

## 2023-03-15 PROCEDURE — 3077F SYST BP >= 140 MM HG: CPT | Performed by: CLINICAL NURSE SPECIALIST

## 2023-03-15 PROCEDURE — 3044F HG A1C LEVEL LT 7.0%: CPT | Performed by: CLINICAL NURSE SPECIALIST

## 2023-03-15 PROCEDURE — 3080F DIAST BP >= 90 MM HG: CPT | Performed by: CLINICAL NURSE SPECIALIST

## 2023-03-15 PROCEDURE — 99214 OFFICE O/P EST MOD 30 MIN: CPT | Performed by: CLINICAL NURSE SPECIALIST

## 2023-03-15 PROCEDURE — G8417 CALC BMI ABV UP PARAM F/U: HCPCS | Performed by: CLINICAL NURSE SPECIALIST

## 2023-03-15 PROCEDURE — G8427 DOCREV CUR MEDS BY ELIG CLIN: HCPCS | Performed by: CLINICAL NURSE SPECIALIST

## 2023-03-15 RX ORDER — METFORMIN HYDROCHLORIDE 500 MG/1
500 TABLET, EXTENDED RELEASE ORAL 2 TIMES DAILY WITH MEALS
Qty: 60 TABLET | Refills: 11 | Status: SHIPPED | OUTPATIENT
Start: 2023-03-15

## 2023-03-15 NOTE — PROGRESS NOTES
[address] [place] [address] Department of Endocrinology Diabetes and Metabolism   [address] [facility] [address]   Phone: [phone]  Fax: [phone]        Date of admission: (Not on file)  Date of service: 3/15/2023  Admitting physician: No admitting provider for patient encounter. Primary Care Physician: Tiffanie Michelle MD  Consultant physician: MARIA TERESA Julian      Reason for the consultation:  Uncontrolled DM    History of Present Illness: The history is provided by the patient's sister     Stephen Gilman is a very pleasant 34 y.o. old male with PMH of HTN, hyperTG and recently diagnosed DM seen today for follow up visit    The patient was recently admitted to the hospital on on 10/19/22 with polyuria, polydipsia, urine incontinence and found to be in DKA, Admission labs , AG 32, bicarb 17, lactic acid 2.7, beta hydroxy > 4.5, and UA with glycosuria and ketonuria. Currently on: Metformin 500 mg ER  2 tablet BID  Complaining of diarrhea    Nursing care comes to home twice per week   He does not check BG often     labs showed normal c-peptide and negative PANDA-65 Ab   Hemoglobin A1C   Date Value Ref Range Status   03/09/2023 6.0 % Final     Up-to-date with ophthalmology exam      Past medical history:   Past Medical History:   Diagnosis Date    Cerumen impaction     Hypertension     Seizure ([address] 9/2/2018    Questionable seizure, occurred 2018 with syncopal episode. No AED and no recurrence since. Past surgical history:  Past Surgical History:   Procedure Laterality Date    OTHER SURGICAL HISTORY Bilateral 01/29/2018    ear wax abstraction       Social history:   Tobacco:   reports that he has never smoked. He has never used smokeless tobacco.  Alcohol:   reports no history of alcohol use. Drugs:   reports no history of drug use.     Family history:    Family History   Problem Relation Age of Onset    No Known Problems Mother     No Known Problems Father       Allergy and drug reactions:   No Known Allergies    Current Outpatient Medications   Medication Sig Dispense Refill    metFORMIN (GLUCOPHAGE-XR) 500 MG extended release tablet Take 1 tablet by mouth 2 times daily (with meals) 60 tablet 11    rosuvastatin (CRESTOR) 20 MG tablet take 1 tablet by mouth at bedtime      Blood Glucose Monitoring Suppl (ONE TOUCH ULTRA 2) w/Device KIT Use to check blood glucose 1 kit 0    blood glucose test strips (ONETOUCH ULTRA) strip Check blood glucose every other day 100 each 5    OneTouch Delica Lancets 33G MISC Check blood glucose every other day 100 each 5    Blood Glucose Monitoring Suppl MISC OneTouch ultra Glucometer 1 each 0    ONE TOUCH ULTRASOFT LANCETS MISC Test 4 times/day before meals and at bedtime and as needed for symptoms of irregular blood glucose. 250 each 5    blood glucose monitor strips One-Touch Ultra strips. Check 4 times/day before meals and at bedtime and as needed for symptoms of irregular blood glucose 250 strip 5    amLODIPine (NORVASC) 5 MG tablet take 1 tablet by mouth once daily 90 tablet 1    Misc. Devices MISC BP cuff - may substitute brand for insurance coverage. 1 each 0    Continuous Blood Gluc Sensor (FREESTYLE NAYELY 2 SENSOR) MISC Change sensor every 14 days (Patient not taking: No sig reported) 6 each 3    acetaminophen (TYLENOL) 500 MG tablet Take 1 tablet by mouth 4 times daily as needed for Pain (Patient not taking: No sig reported) 30 tablet 1    Misc. Devices KIT BP cuff - may substitute brand for insurance coverage. (Patient not taking: No sig reported) 1 kit 0    ascorbic acid (VITAMIN C) 500 MG tablet Take 1 tablet by mouth 2 times daily for 7 days 14 tablet 0     No current facility-administered medications for this visit.       Review of Systems  All systems reviewed. All negative except for symptoms mentioned in HPI     OBJECTIVE    BP (!) 146/98   Pulse 58   Resp 16   Ht 5' 3\" (1.6 m)   Wt 169 lb (76.7 kg)   SpO2 99%   BMI  29.94 kg/m²   No intake or output data in the 24 hours ending 03/15/23 1258      Physical examination:  General: awake alert, oriented x3  HEENT: normocephalic non traumatic, no exophthalmos   Neck: supple, No thyroid tenderness,  Pulm: good equal air entry no added sounds  CVS: S1 + S2  Abd: soft lax, no tenderness  Skin: warm, no lesions, no rash.  No open wounds, no ulcers   Neuro: CN intact, sensation present  bilateral , muscle power normal  Psych: normal mood, and affect    Review of Laboratory Data:  I personally reviewed the following labs:   Lab Results   Component Value Date/Time    WBC 5.2 10/24/2022 02:31 AM    RBC 4.60 10/24/2022 02:31 AM    HGB 13.4 10/24/2022 02:31 AM    HCT 38.0 10/24/2022 02:31 AM    MCV 82.6 10/24/2022 02:31 AM    MCH 29.1 10/24/2022 02:31 AM    MCHC 35.3 (H) 10/24/2022 02:31 AM    RDW 11.3 (L) 10/24/2022 02:31 AM     10/24/2022 02:31 AM    MPV 10.3 10/24/2022 02:31 AM      Lab Results   Component Value Date/Time     10/24/2022 02:31 AM    K 3.7 10/24/2022 02:31 AM    K 5.8 (H) 10/19/2022 06:44 PM    CO2 27 10/24/2022 02:31 AM    BUN 9 10/24/2022 02:31 AM    CREATININE 0.8 10/24/2022 02:31 AM    CALCIUM 8.7 10/24/2022 02:31 AM    LABGLOM >60 10/24/2022 02:31 AM    GFRAA >60 12/05/2021 08:32 PM      Lab Results   Component Value Date/Time    TSH 0.292 12/14/2022 01:37 PM    T4FREE 1.31 12/14/2022 01:37 PM     Lab Results   Component Value Date/Time    LABA1C 6.0 03/09/2023 04:09 PM    GLUCOSE 236 10/24/2022 02:31 AM    MALBCR - 10/20/2022 09:30 AM    LABMICR <12.0 10/20/2022 09:30 AM    LABCREA 123 10/20/2022 09:30 AM     Lab Results   Component Value Date/Time    LABA1C 6.0 03/09/2023 04:09 PM    LABA1C 11.2 10/19/2022 11:22 PM    LABA1C 5.9 01/11/2022 09:13 AM     Lab Results   Component Value Date/Time    TRIG 245 01/22/2015 09:58 AM    HDL 68 01/22/2015 09:58 AM    LDLCALC 68 01/22/2015 09:58 AM    CHOL 185 01/22/2015 09:58 AM       ASSESSMENT & PLAN   Amrit Jimenez Javier Mcmahon, a 34 y.o.-old male seen today for inpatient diabetes management     Newly diagnosed Diabetes Mellitus admitted with DKA and BG of 999  Hemoglobin A1c 6%  Patient complaining of diarrhea since the increase in metformin  Plan: Decrease metformin extended release to 500 mg 1 tablets twice per day  Patient has not checked blood sugars frequently  Advised patient to check blood sugars at least every other day  Send blood glucose log in 2 weeks  With underlying cognitive impairment, insulin therapy will likely be challenging      Abnormal thyroid function test   Thyroid levels were rechecked and are normal    Hyperlipidemia  On statin. Managed per PCP    I personally spent greater than 30 minutes reviewing external notes from PCP and other patient's care team providers, and personally interpreted labs associated with the above diagnosis. I also ordered labs to further assess and manage the above addressed medical conditions    Return in about 3 months (around 6/15/2023). The above issues were reviewed with the patient who understood and agreed with the plan. Thank you for allowing us to participate in the care of this patient. Please do not hesitate to contact us with any additional questions. Diagnosis Orders   1. Type 2 diabetes mellitus with hyperglycemia, unspecified whether long term insulin use ([facility])  metFORMIN (GLUCOPHAGE-XR) 500 MG extended release tablet              MARIA TERESA Mitchell    [facility] - [facility] Diabetes Care and Endocrinology   [address] [facility] [address]   Phone: [phone]  Fax: [phone]  --------------------------------------------  An electronic signature was used to authenticate this note.  MARIA TERESA Mitchell  on 3/15/2023 at 12:58 PM

## 2023-04-10 ENCOUNTER — OFFICE VISIT (OUTPATIENT)
Dept: INTERNAL MEDICINE | Age: 38
End: 2023-04-10
Payer: MEDICARE

## 2023-04-10 VITALS
HEIGHT: 63 IN | TEMPERATURE: 97.1 F | HEART RATE: 88 BPM | RESPIRATION RATE: 18 BRPM | OXYGEN SATURATION: 98 % | BODY MASS INDEX: 29.23 KG/M2 | DIASTOLIC BLOOD PRESSURE: 89 MMHG | WEIGHT: 165 LBS | SYSTOLIC BLOOD PRESSURE: 123 MMHG

## 2023-04-10 DIAGNOSIS — E78.5 HYPERLIPIDEMIA, UNSPECIFIED HYPERLIPIDEMIA TYPE: ICD-10-CM

## 2023-04-10 DIAGNOSIS — M25.50 ARTHRALGIA, UNSPECIFIED JOINT: ICD-10-CM

## 2023-04-10 DIAGNOSIS — Z11.59 NEED FOR HEPATITIS C SCREENING TEST: ICD-10-CM

## 2023-04-10 DIAGNOSIS — Z11.4 ENCOUNTER FOR SCREENING FOR HIV: ICD-10-CM

## 2023-04-10 DIAGNOSIS — Z23 NEED FOR PNEUMOCOCCAL VACCINATION: ICD-10-CM

## 2023-04-10 DIAGNOSIS — I10 ESSENTIAL HYPERTENSION: ICD-10-CM

## 2023-04-10 DIAGNOSIS — E11.65 TYPE 2 DIABETES MELLITUS WITH HYPERGLYCEMIA, WITHOUT LONG-TERM CURRENT USE OF INSULIN (HCC): Primary | ICD-10-CM

## 2023-04-10 DIAGNOSIS — Z23 NEED FOR CHICKENPOX VACCINATION: ICD-10-CM

## 2023-04-10 PROBLEM — R56.9 SEIZURE (HCC): Status: RESOLVED | Noted: 2018-09-02 | Resolved: 2023-04-10

## 2023-04-10 PROBLEM — E83.52 HYPERCALCEMIA: Status: RESOLVED | Noted: 2022-10-19 | Resolved: 2023-04-10

## 2023-04-10 PROCEDURE — 2022F DILAT RTA XM EVC RTNOPTHY: CPT | Performed by: STUDENT IN AN ORGANIZED HEALTH CARE EDUCATION/TRAINING PROGRAM

## 2023-04-10 PROCEDURE — 3078F DIAST BP <80 MM HG: CPT | Performed by: STUDENT IN AN ORGANIZED HEALTH CARE EDUCATION/TRAINING PROGRAM

## 2023-04-10 PROCEDURE — 3074F SYST BP LT 130 MM HG: CPT | Performed by: STUDENT IN AN ORGANIZED HEALTH CARE EDUCATION/TRAINING PROGRAM

## 2023-04-10 PROCEDURE — 99213 OFFICE O/P EST LOW 20 MIN: CPT | Performed by: STUDENT IN AN ORGANIZED HEALTH CARE EDUCATION/TRAINING PROGRAM

## 2023-04-10 PROCEDURE — 90677 PCV20 VACCINE IM: CPT | Performed by: INTERNAL MEDICINE

## 2023-04-10 PROCEDURE — 99212 OFFICE O/P EST SF 10 MIN: CPT | Performed by: STUDENT IN AN ORGANIZED HEALTH CARE EDUCATION/TRAINING PROGRAM

## 2023-04-10 PROCEDURE — G8427 DOCREV CUR MEDS BY ELIG CLIN: HCPCS | Performed by: STUDENT IN AN ORGANIZED HEALTH CARE EDUCATION/TRAINING PROGRAM

## 2023-04-10 PROCEDURE — 1036F TOBACCO NON-USER: CPT | Performed by: STUDENT IN AN ORGANIZED HEALTH CARE EDUCATION/TRAINING PROGRAM

## 2023-04-10 PROCEDURE — G8417 CALC BMI ABV UP PARAM F/U: HCPCS | Performed by: STUDENT IN AN ORGANIZED HEALTH CARE EDUCATION/TRAINING PROGRAM

## 2023-04-10 PROCEDURE — 3044F HG A1C LEVEL LT 7.0%: CPT | Performed by: STUDENT IN AN ORGANIZED HEALTH CARE EDUCATION/TRAINING PROGRAM

## 2023-04-10 RX ORDER — ACETAMINOPHEN 500 MG
500 TABLET ORAL 4 TIMES DAILY PRN
Qty: 30 TABLET | Refills: 3 | Status: SHIPPED | OUTPATIENT
Start: 2023-04-10

## 2023-04-10 RX ORDER — ICOSAPENT ETHYL 1000 MG/1
CAPSULE ORAL
COMMUNITY
Start: 2023-03-16

## 2023-04-10 RX ORDER — LISINOPRIL 2.5 MG/1
TABLET ORAL
COMMUNITY
Start: 2023-03-16 | End: 2023-04-10 | Stop reason: CLARIF

## 2023-04-10 RX ORDER — AMLODIPINE BESYLATE 5 MG/1
TABLET ORAL
Qty: 90 TABLET | Refills: 1 | Status: SHIPPED | OUTPATIENT
Start: 2023-04-10

## 2023-04-10 RX ORDER — ROSUVASTATIN CALCIUM 20 MG/1
20 TABLET, COATED ORAL NIGHTLY
Qty: 90 TABLET | Refills: 1 | Status: SHIPPED | OUTPATIENT
Start: 2023-04-10

## 2023-04-10 SDOH — ECONOMIC STABILITY: INCOME INSECURITY: HOW HARD IS IT FOR YOU TO PAY FOR THE VERY BASICS LIKE FOOD, HOUSING, MEDICAL CARE, AND HEATING?: NOT HARD AT ALL

## 2023-04-10 SDOH — ECONOMIC STABILITY: FOOD INSECURITY: WITHIN THE PAST 12 MONTHS, YOU WORRIED THAT YOUR FOOD WOULD RUN OUT BEFORE YOU GOT MONEY TO BUY MORE.: NEVER TRUE

## 2023-04-10 SDOH — ECONOMIC STABILITY: FOOD INSECURITY: WITHIN THE PAST 12 MONTHS, THE FOOD YOU BOUGHT JUST DIDN'T LAST AND YOU DIDN'T HAVE MONEY TO GET MORE.: NEVER TRUE

## 2023-04-10 SDOH — ECONOMIC STABILITY: HOUSING INSECURITY
IN THE LAST 12 MONTHS, WAS THERE A TIME WHEN YOU DID NOT HAVE A STEADY PLACE TO SLEEP OR SLEPT IN A SHELTER (INCLUDING NOW)?: NO

## 2023-04-10 ASSESSMENT — ENCOUNTER SYMPTOMS
WHEEZING: 0
CONSTIPATION: 0
ABDOMINAL PAIN: 0
SORE THROAT: 0
COUGH: 0
DIARRHEA: 0
SHORTNESS OF BREATH: 0
VOMITING: 0
NAUSEA: 0

## 2023-04-10 ASSESSMENT — LIFESTYLE VARIABLES
HOW MANY STANDARD DRINKS CONTAINING ALCOHOL DO YOU HAVE ON A TYPICAL DAY: PATIENT DOES NOT DRINK
HOW OFTEN DO YOU HAVE A DRINK CONTAINING ALCOHOL: NEVER

## 2023-06-30 ENCOUNTER — HOSPITAL ENCOUNTER (OUTPATIENT)
Dept: AUDIOLOGY | Age: 38
Discharge: HOME | End: 2023-06-30

## 2023-06-30 PROCEDURE — 9990000010 HC NO CHARGE VISIT: Performed by: AUDIOLOGIST

## 2023-08-17 ENCOUNTER — OFFICE VISIT (OUTPATIENT)
Dept: INTERNAL MEDICINE | Age: 38
End: 2023-08-17
Payer: MEDICARE

## 2023-08-17 VITALS
RESPIRATION RATE: 18 BRPM | SYSTOLIC BLOOD PRESSURE: 120 MMHG | BODY MASS INDEX: 28 KG/M2 | OXYGEN SATURATION: 97 % | HEIGHT: 63 IN | HEART RATE: 90 BPM | DIASTOLIC BLOOD PRESSURE: 79 MMHG | TEMPERATURE: 97.7 F | WEIGHT: 158 LBS

## 2023-08-17 DIAGNOSIS — I10 PRIMARY HYPERTENSION: ICD-10-CM

## 2023-08-17 DIAGNOSIS — H90.3 SENSORINEURAL HEARING LOSS (SNHL) OF BOTH EARS: ICD-10-CM

## 2023-08-17 DIAGNOSIS — E11.65 TYPE 2 DIABETES MELLITUS WITH HYPERGLYCEMIA, WITHOUT LONG-TERM CURRENT USE OF INSULIN (HCC): Primary | ICD-10-CM

## 2023-08-17 DIAGNOSIS — E78.5 HYPERLIPIDEMIA, UNSPECIFIED HYPERLIPIDEMIA TYPE: ICD-10-CM

## 2023-08-17 PROCEDURE — 99212 OFFICE O/P EST SF 10 MIN: CPT

## 2023-08-17 RX ORDER — LISINOPRIL 2.5 MG/1
TABLET ORAL
COMMUNITY
Start: 2023-08-11 | End: 2023-08-17

## 2023-08-17 RX ORDER — ROSUVASTATIN CALCIUM 20 MG/1
20 TABLET, COATED ORAL NIGHTLY
Qty: 90 TABLET | Refills: 1 | Status: SHIPPED | OUTPATIENT
Start: 2023-08-17

## 2023-08-17 RX ORDER — AMLODIPINE BESYLATE 5 MG/1
TABLET ORAL
Qty: 90 TABLET | Refills: 1 | Status: SHIPPED | OUTPATIENT
Start: 2023-08-17

## 2023-08-17 NOTE — PROGRESS NOTES
[address] [facility]  Internal Medicine Residency Clinic    Attending Physician Statement  I have discussed the case, including pertinent history and exam findings with the resident physician. I agree with the assessment, plan and orders as documented by the resident. I have reviewed all pertinent PMHx, PSHx, FamHx, SocialHx, medications, and allergies and updated history as appropriate. Patient here for routine follow up of medical problems. Patient has hx HTN, DM, not on insulin, both of which are controlled,  hyperlipidemia on statin and congenital deafness. Here mainly for med refills. Remainder of medical problems as per resident note.     Natalie Farah DO  8/17/2023 2:43 PM

## 2023-08-17 NOTE — PATIENT INSTRUCTIONS
Dear Mirna Ty,    Thank you for coming to your appointment today. I hope we have addressed all of your needs. Please make sure to do the following:  - Continue your medications as listed. - We will see each other again in 5 months    Call for a sooner appointment if you develop any issues    Have a great day!     Sincerely,  Micki Jaramillo M.D  8/17/2023  2:43 PM

## 2023-10-06 ENCOUNTER — FOLLOWUP TELEPHONE ENCOUNTER (OUTPATIENT)
Dept: AUDIOLOGY | Age: 38
End: 2023-10-06

## 2023-10-06 NOTE — PROGRESS NOTES
Attempted to call Patti Vital re note left at office about \"piece for hearing aid\". Unsure of what he meant. Phone number he left would not dial through. [facility], she said he is all set up with [facility] in [place] and not sure what Patti Zahraa wanted. I told her have him call if needed. She will do so.   Electronically signed by Brice Saleh on 10/6/2023 at 10:32 AM

## 2024-02-07 ENCOUNTER — TELEPHONE (OUTPATIENT)
Dept: INTERNAL MEDICINE | Age: 39
End: 2024-02-07

## 2024-02-07 NOTE — TELEPHONE ENCOUNTER
Per [facility], patient needs brand name Vascepa 1g script or the office can do prior authorization for generic name. [facility] can be reached at [phone]

## 2024-02-08 RX ORDER — ICOSAPENT ETHYL 1000 MG/1
2 CAPSULE ORAL 2 TIMES DAILY
Qty: 60 CAPSULE | Refills: 3 | Status: SHIPPED | OUTPATIENT
Start: 2024-02-08

## 2024-02-08 NOTE — TELEPHONE ENCOUNTER
Will put in brand name Vascepa 1 per [facility]    Electronically signed by Angus Baltazar MD on 2/8/2024 at 8:33 AM

## 2024-02-26 DIAGNOSIS — I10 PRIMARY HYPERTENSION: ICD-10-CM

## 2024-02-26 RX ORDER — AMLODIPINE BESYLATE 5 MG/1
TABLET ORAL
Qty: 60 TABLET | OUTPATIENT
Start: 2024-02-26

## 2024-03-02 DIAGNOSIS — I10 PRIMARY HYPERTENSION: ICD-10-CM

## 2024-03-04 RX ORDER — AMLODIPINE BESYLATE 5 MG/1
TABLET ORAL
Qty: 60 TABLET | OUTPATIENT
Start: 2024-03-04

## 2024-03-13 DIAGNOSIS — I10 PRIMARY HYPERTENSION: ICD-10-CM

## 2024-03-13 RX ORDER — AMLODIPINE BESYLATE 5 MG/1
TABLET ORAL
Qty: 60 TABLET | OUTPATIENT
Start: 2024-03-13

## 2024-03-14 DIAGNOSIS — E78.5 HYPERLIPIDEMIA, UNSPECIFIED HYPERLIPIDEMIA TYPE: ICD-10-CM

## 2024-03-14 DIAGNOSIS — I10 PRIMARY HYPERTENSION: ICD-10-CM

## 2024-03-14 DIAGNOSIS — E11.65 TYPE 2 DIABETES MELLITUS WITH HYPERGLYCEMIA, UNSPECIFIED WHETHER LONG TERM INSULIN USE (HCC): ICD-10-CM

## 2024-03-14 RX ORDER — ROSUVASTATIN CALCIUM 40 MG/1
40 TABLET, COATED ORAL EVERY EVENING
Qty: 90 TABLET | Refills: 1 | Status: SHIPPED | OUTPATIENT
Start: 2024-03-14

## 2024-03-14 RX ORDER — LISINOPRIL 2.5 MG/1
2.5 TABLET ORAL DAILY
Qty: 90 TABLET | Refills: 2 | Status: SHIPPED | OUTPATIENT
Start: 2024-03-14

## 2024-03-14 RX ORDER — METFORMIN HYDROCHLORIDE 500 MG/1
500 TABLET, EXTENDED RELEASE ORAL 2 TIMES DAILY WITH MEALS
Qty: 180 TABLET | Refills: 1 | Status: SHIPPED | OUTPATIENT
Start: 2024-03-14

## 2024-03-14 RX ORDER — AMLODIPINE BESYLATE 5 MG/1
TABLET ORAL
Qty: 90 TABLET | Refills: 1 | Status: SHIPPED | OUTPATIENT
Start: 2024-03-14

## 2024-03-14 NOTE — TELEPHONE ENCOUNTER
Last Appointment:  8/17/2023  Future Appointments   Date Time Provider Department Center   4/15/2024  2:00 PM Angus Baltazar MD [facility]      Pt is here requesting a 30 day supply of medications until appt .

## 2024-04-15 ENCOUNTER — OFFICE VISIT (OUTPATIENT)
Dept: INTERNAL MEDICINE | Age: 39
End: 2024-04-15
Payer: MEDICARE

## 2024-04-15 VITALS
DIASTOLIC BLOOD PRESSURE: 88 MMHG | WEIGHT: 167 LBS | RESPIRATION RATE: 18 BRPM | TEMPERATURE: 98.6 F | HEIGHT: 62 IN | HEART RATE: 93 BPM | BODY MASS INDEX: 30.73 KG/M2 | SYSTOLIC BLOOD PRESSURE: 119 MMHG | OXYGEN SATURATION: 97 %

## 2024-04-15 DIAGNOSIS — H90.3 SENSORINEURAL HEARING LOSS (SNHL) OF BOTH EARS: ICD-10-CM

## 2024-04-15 DIAGNOSIS — I10 PRIMARY HYPERTENSION: ICD-10-CM

## 2024-04-15 DIAGNOSIS — E11.65 TYPE 2 DIABETES MELLITUS WITH HYPERGLYCEMIA, UNSPECIFIED WHETHER LONG TERM INSULIN USE (HCC): ICD-10-CM

## 2024-04-15 DIAGNOSIS — E78.5 HYPERLIPIDEMIA, UNSPECIFIED HYPERLIPIDEMIA TYPE: ICD-10-CM

## 2024-04-15 DIAGNOSIS — I10 PRIMARY HYPERTENSION: Primary | ICD-10-CM

## 2024-04-15 PROBLEM — H61.20 EXCESS EAR WAX: Status: RESOLVED | Noted: 2022-01-11 | Resolved: 2024-04-15

## 2024-04-15 PROBLEM — M25.50 ARTHRALGIA: Status: RESOLVED | Noted: 2023-04-10 | Resolved: 2024-04-15

## 2024-04-15 LAB — HBA1C MFR BLD: 6.8 %

## 2024-04-15 PROCEDURE — 3079F DIAST BP 80-89 MM HG: CPT

## 2024-04-15 PROCEDURE — 2022F DILAT RTA XM EVC RTNOPTHY: CPT

## 2024-04-15 PROCEDURE — 83036 HEMOGLOBIN GLYCOSYLATED A1C: CPT

## 2024-04-15 PROCEDURE — 99213 OFFICE O/P EST LOW 20 MIN: CPT

## 2024-04-15 PROCEDURE — 3074F SYST BP LT 130 MM HG: CPT

## 2024-04-15 PROCEDURE — G8427 DOCREV CUR MEDS BY ELIG CLIN: HCPCS

## 2024-04-15 PROCEDURE — G8417 CALC BMI ABV UP PARAM F/U: HCPCS

## 2024-04-15 PROCEDURE — 1036F TOBACCO NON-USER: CPT

## 2024-04-15 PROCEDURE — 3044F HG A1C LEVEL LT 7.0%: CPT

## 2024-04-15 RX ORDER — METFORMIN HYDROCHLORIDE 500 MG/1
1000 TABLET, EXTENDED RELEASE ORAL 2 TIMES DAILY WITH MEALS
Qty: 180 TABLET | Refills: 1 | Status: SHIPPED | OUTPATIENT
Start: 2024-04-15

## 2024-04-15 RX ORDER — AMLODIPINE BESYLATE 5 MG/1
TABLET ORAL
Qty: 60 TABLET | OUTPATIENT
Start: 2024-04-15

## 2024-04-15 SDOH — ECONOMIC STABILITY: FOOD INSECURITY: WITHIN THE PAST 12 MONTHS, YOU WORRIED THAT YOUR FOOD WOULD RUN OUT BEFORE YOU GOT MONEY TO BUY MORE.: NEVER TRUE

## 2024-04-15 SDOH — ECONOMIC STABILITY: FOOD INSECURITY: WITHIN THE PAST 12 MONTHS, THE FOOD YOU BOUGHT JUST DIDN'T LAST AND YOU DIDN'T HAVE MONEY TO GET MORE.: NEVER TRUE

## 2024-04-15 SDOH — ECONOMIC STABILITY: INCOME INSECURITY: HOW HARD IS IT FOR YOU TO PAY FOR THE VERY BASICS LIKE FOOD, HOUSING, MEDICAL CARE, AND HEATING?: NOT HARD AT ALL

## 2024-04-15 ASSESSMENT — PATIENT HEALTH QUESTIONNAIRE - PHQ9
2. FEELING DOWN, DEPRESSED OR HOPELESS: NOT AT ALL
SUM OF ALL RESPONSES TO PHQ9 QUESTIONS 1 & 2: 0
SUM OF ALL RESPONSES TO PHQ QUESTIONS 1-9: 0
1. LITTLE INTEREST OR PLEASURE IN DOING THINGS: NOT AT ALL
SUM OF ALL RESPONSES TO PHQ QUESTIONS 1-9: 0

## 2024-04-15 NOTE — PATIENT INSTRUCTIONS
Dear Silvano Aldrich,    Thank you for coming to your appointment today. I hope we have addressed all of your needs.     Please make sure to do the following:  - Continue your medications as listed.  - stop taking amlodipine, continue taking lisinopril for your blood pressure  - Increase your metformin 500 twice a day to 1,000 twice a day   - Get labs drawn before our next follow up.  - Referrals have been made to optometry:  If you do not hear from the office in 1 week, please call the number listed.  - We will see each other again in 5 months    Call for a sooner appointment if you develop any issues    Have a great day!    Sincerely,  Angus Baltazar M.D  4/15/2024  2:50 PM

## 2024-04-15 NOTE — PROGRESS NOTES
[facility]  Internal Medicine Residency Clinic    Attending Physician Statement  I have discussed the case, including pertinent history and exam findings with the resident physician.  I agree with the assessment, plan and orders as documented by the resident. I have reviewed the relevant PMHx, PSHx, FamHx, SocialHx, medications, and allergies and updated history as appropriate.    Patient presents for routine follow up of medical problems.    HTN  Reports not taken any BP meds for 5 months  /88 in office, continue lisinopril 5 mg daily for renal protective effect.    DM 2   Increasing A1c to 6.8%   Continue metformin 1000 mg BID    Referral to optho    Screening:   Schedule for AWV in 5 months.    Remainder of medical problems as per resident note.    [place],   4/15/2024 2:45 PM

## 2024-04-15 NOTE — PROGRESS NOTES
[facility]  Internal Medicine Residency Program  ACC Note      CC: had concerns including Follow-up and Hypertension.    HPI:Silvano Aldrich has a PMHx of HTN, HLD, NIDDM, hereditary bilateral hearing loss presented to the [facility] for a routine visit    /79. Questionable    Lisinopril, stop amlodipine     Referral for eye doctor     A1c 6 > 6.8, inc metformin 500 BID to 1000 BID    Health Care Maintenance:   Foot exam done w/ monofilament no sign of neuropathy    Things to follow up:  Did he see the eye doctor  Confirm he's taking lisinopril and stopped amlodipine  Might need to add 2nd diabetic agent     SUBJECTIVE:    ROS:  Constitutional:  Negative for appetite change, chills, fatigue, fever and unexpected weight change.   HENT:  Negative for sneezing and sore throat.  Bilateral hearing loss with aids   Eyes:  Negative for discharge and redness.   Respiratory:  Negative for cough, chest tightness, shortness of breath and wheezing.    Cardiovascular:  Negative for chest pain, palpitations and leg swelling.   Gastrointestinal:  Negative for N/V/C/D, abdominal distention, abdominal pain.   Genitourinary:  Negative for dysuria and increased frequency.   Musculoskeletal: Negative.    Skin: Negative.    Neurological:  Negative for dizziness, weakness, light-headedness and headaches.   Other:     I have reviewed all pertinent PMHx, PSHx, FamHx, SocialHx, medications, and allergies and updated history as appropriate.    OBJECTIVE:    VS:   /88 (Site: Left Upper Arm, Position: Sitting, Cuff Size: Medium Adult)   Pulse 93   Temp 98.6 °F (37 °C) (Temporal)   Resp 18   Ht 1.575 m (5' 2\")   Wt 75.8 kg (167 lb)   SpO2 97%   BMI 30.54 kg/m²     Physical Exam:  General: No acute distress. Awake and conversant.   Neuro: Hearing loss bilaterally, sensation intact   Psych: Alert and oriented. Cooperative, Appropriate mood and affect, Normal judgment.   Eyes: Normal conjunctiva, anicteric. Round

## 2024-04-22 DIAGNOSIS — I10 PRIMARY HYPERTENSION: ICD-10-CM

## 2024-04-22 RX ORDER — AMLODIPINE BESYLATE 5 MG/1
TABLET ORAL
Qty: 60 TABLET | OUTPATIENT
Start: 2024-04-22

## 2024-10-09 DIAGNOSIS — I10 PRIMARY HYPERTENSION: ICD-10-CM

## 2024-10-09 RX ORDER — LISINOPRIL 2.5 MG/1
2.5 TABLET ORAL DAILY
Qty: 30 TABLET | Refills: 2 | Status: SHIPPED | OUTPATIENT
Start: 2024-10-09

## 2024-10-09 RX ORDER — ICOSAPENT ETHYL 1 G/1
2 CAPSULE ORAL 2 TIMES DAILY
Qty: 120 CAPSULE | Refills: 2 | Status: SHIPPED | OUTPATIENT
Start: 2024-10-09

## 2024-10-21 ENCOUNTER — HOSPITAL ENCOUNTER (OUTPATIENT)
Age: 39
Discharge: HOME | End: 2024-10-21
Payer: MEDICARE

## 2024-10-21 DIAGNOSIS — Z23 NEED FOR VARICELLA VACCINE: ICD-10-CM

## 2024-10-21 DIAGNOSIS — E11.65 TYPE 2 DIABETES MELLITUS WITH HYPERGLYCEMIA, UNSPECIFIED WHETHER LONG TERM INSULIN USE (HCC): ICD-10-CM

## 2024-10-21 DIAGNOSIS — Z11.4 SCREENING FOR HUMAN IMMUNODEFICIENCY VIRUS: ICD-10-CM

## 2024-10-21 DIAGNOSIS — E78.5 HYPERLIPIDEMIA, UNSPECIFIED HYPERLIPIDEMIA TYPE: ICD-10-CM

## 2024-10-21 DIAGNOSIS — Z11.59 NEED FOR HEPATITIS C SCREENING TEST: ICD-10-CM

## 2024-10-21 LAB
ANION GAP SERPL CALCULATED.3IONS-SCNC: 17 MMOL/L (ref 7–16)
BUN SERPL-MCNC: 20 MG/DL (ref 6–20)
CALCIUM SERPL-MCNC: 9.6 MG/DL (ref 8.6–10.2)
CHLORIDE SERPL-SCNC: 104 MMOL/L (ref 98–107)
CHOLEST SERPL-MCNC: 200 MG/DL
CO2 SERPL-SCNC: 23 MMOL/L (ref 22–29)
CREAT SERPL-MCNC: 0.9 MG/DL (ref 0.7–1.2)
GFR, ESTIMATED: >90 ML/MIN/1.73M2
GLUCOSE SERPL-MCNC: 113 MG/DL (ref 74–99)
HBA1C MFR BLD: 5.8 % (ref 4–5.6)
HDLC SERPL-MCNC: 52 MG/DL
LDLC SERPL CALC-MCNC: 130 MG/DL
POTASSIUM SERPL-SCNC: 4.1 MMOL/L (ref 3.5–5)
SODIUM SERPL-SCNC: 144 MMOL/L (ref 132–146)
TRIGL SERPL-MCNC: 92 MG/DL
VLDLC SERPL CALC-MCNC: 18 MG/DL

## 2024-10-21 PROCEDURE — 80061 LIPID PANEL: CPT

## 2024-10-21 PROCEDURE — 87389 HIV-1 AG W/HIV-1&-2 AB AG IA: CPT

## 2024-10-21 PROCEDURE — 80048 BASIC METABOLIC PNL TOTAL CA: CPT

## 2024-10-21 PROCEDURE — 36415 COLL VENOUS BLD VENIPUNCTURE: CPT

## 2024-10-21 PROCEDURE — 86787 VARICELLA-ZOSTER ANTIBODY: CPT

## 2024-10-21 PROCEDURE — 86803 HEPATITIS C AB TEST: CPT

## 2024-10-21 PROCEDURE — 83036 HEMOGLOBIN GLYCOSYLATED A1C: CPT

## 2024-10-22 LAB
HCV AB SERPL QL IA: NONREACTIVE
HIV 1+2 AB+HIV1 P24 AG SERPL QL IA: NONREACTIVE

## 2024-10-22 NOTE — PROGRESS NOTES
[facility]  Internal Medicine Residency Program  ACC Note      CC: had concerns including Medicare AWV.    HPI:Silvano Aldrich has a PMHx of HTN, HLD, NIDDM, hereditary bilateral hearing loss presented to the [facility] for a routine visit    /82, no changes     Labs reviewed - HIV neg,  previously well controlled will repeat in 1 year already on Crestor, hep C nonreactive, A1c 5.8%    Health Care Maintenance:   No need varicella given antibodies positive  Received flu vaccine    Things to follow up:  Repeat lipid 6 months  Full MMSE    ASSESSMENT/PLAN:  1. Primary hypertension  Overview:  Well controlled  Does not report any headaches, blurry vision, dizziness, chest pain, shortness of breath, or palpitations.  BP Readings from Last 3 Encounters:   10/23/24 132/82   04/15/24 119/88   12/18/23 123/83      Hypertension Medications       ACE Inhibitors       lisinopril (PRINIVIL;ZESTRIL) 2.5 MG tablet Take 1 tablet by mouth daily            Orders:  -     lisinopril (PRINIVIL;ZESTRIL) 2.5 MG tablet; Take 1 tablet by mouth daily, Disp-90 tablet, R-1Normal  2. Type 2 diabetes mellitus with hyperglycemia, without long-term current use of insulin (HCC)  Overview:  Diabetes since   Microvascular complications - follows eye doctor, last foot exam 4/2024, neuropathy no  Macrovascular complications -   Patient is compliant. Does tolerates metformin   Their home reference range is   No episodes of hypoglycemia  Diabetic Medications       Biguanides       metFORMIN (GLUCOPHAGE-XR) 500 MG extended release tablet Take 2 tablets by mouth 2 times daily (with meals)           Hemoglobin A1C   Date Value Ref Range Status   10/21/2024 5.8 (H) 4.0 - 5.6 % Final   04/15/2024 6.8 % Final   03/09/2023 6.0 % Final      Lab Results   Component Value Date    MALBCR 7 12/19/2023       3. Hyperlipidemia, unspecified hyperlipidemia type  Overview:  No components found for: \"LDLCHOLESTEROL\", \"LDLCALC\"    The 10-year

## 2024-10-23 ENCOUNTER — OFFICE VISIT (OUTPATIENT)
Dept: INTERNAL MEDICINE | Age: 39
End: 2024-10-23
Payer: MEDICARE

## 2024-10-23 VITALS
WEIGHT: 160.5 LBS | BODY MASS INDEX: 29.53 KG/M2 | OXYGEN SATURATION: 96 % | SYSTOLIC BLOOD PRESSURE: 132 MMHG | RESPIRATION RATE: 18 BRPM | DIASTOLIC BLOOD PRESSURE: 82 MMHG | HEART RATE: 85 BPM | TEMPERATURE: 97.3 F | HEIGHT: 62 IN

## 2024-10-23 DIAGNOSIS — E78.5 HYPERLIPIDEMIA, UNSPECIFIED HYPERLIPIDEMIA TYPE: ICD-10-CM

## 2024-10-23 DIAGNOSIS — Z00.00 MEDICARE ANNUAL WELLNESS VISIT, SUBSEQUENT: ICD-10-CM

## 2024-10-23 DIAGNOSIS — Z76.89 POOR DENTITION REQUIRING REFERRAL TO DENTISTRY: ICD-10-CM

## 2024-10-23 DIAGNOSIS — I10 PRIMARY HYPERTENSION: Primary | ICD-10-CM

## 2024-10-23 DIAGNOSIS — H90.3 SENSORINEURAL HEARING LOSS (SNHL) OF BOTH EARS: ICD-10-CM

## 2024-10-23 DIAGNOSIS — M25.50 ARTHRALGIA, UNSPECIFIED JOINT: ICD-10-CM

## 2024-10-23 DIAGNOSIS — E11.65 TYPE 2 DIABETES MELLITUS WITH HYPERGLYCEMIA, UNSPECIFIED WHETHER LONG TERM INSULIN USE (HCC): ICD-10-CM

## 2024-10-23 DIAGNOSIS — E11.65 TYPE 2 DIABETES MELLITUS WITH HYPERGLYCEMIA, WITHOUT LONG-TERM CURRENT USE OF INSULIN (HCC): ICD-10-CM

## 2024-10-23 LAB
VZV IGG SER IA-ACNC: 16.2 S/CO
VZV IGM SER IA-ACNC: 0.12 ISR

## 2024-10-23 PROCEDURE — 3079F DIAST BP 80-89 MM HG: CPT

## 2024-10-23 PROCEDURE — G8482 FLU IMMUNIZE ORDER/ADMIN: HCPCS

## 2024-10-23 PROCEDURE — 3044F HG A1C LEVEL LT 7.0%: CPT

## 2024-10-23 PROCEDURE — G0439 PPPS, SUBSEQ VISIT: HCPCS

## 2024-10-23 PROCEDURE — 3075F SYST BP GE 130 - 139MM HG: CPT

## 2024-10-23 PROCEDURE — 90656 IIV3 VACC NO PRSV 0.5 ML IM: CPT

## 2024-10-23 PROCEDURE — 99212 OFFICE O/P EST SF 10 MIN: CPT

## 2024-10-23 RX ORDER — LISINOPRIL 2.5 MG/1
2.5 TABLET ORAL DAILY
Qty: 90 TABLET | Refills: 1 | Status: SHIPPED | OUTPATIENT
Start: 2024-10-23

## 2024-10-23 RX ORDER — ACETAMINOPHEN 500 MG
500 TABLET ORAL 4 TIMES DAILY PRN
Qty: 30 TABLET | Refills: 3 | Status: SHIPPED | OUTPATIENT
Start: 2024-10-23

## 2024-10-23 RX ORDER — ROSUVASTATIN CALCIUM 40 MG/1
40 TABLET, COATED ORAL EVERY EVENING
Qty: 90 TABLET | Refills: 1 | Status: SHIPPED | OUTPATIENT
Start: 2024-10-23

## 2024-10-23 RX ORDER — METFORMIN HYDROCHLORIDE 500 MG/1
1000 TABLET, EXTENDED RELEASE ORAL 2 TIMES DAILY WITH MEALS
Qty: 180 TABLET | Refills: 1 | Status: SHIPPED | OUTPATIENT
Start: 2024-10-23

## 2024-10-23 SDOH — ECONOMIC STABILITY: FOOD INSECURITY: WITHIN THE PAST 12 MONTHS, YOU WORRIED THAT YOUR FOOD WOULD RUN OUT BEFORE YOU GOT MONEY TO BUY MORE.: SOMETIMES TRUE

## 2024-10-23 SDOH — ECONOMIC STABILITY: FOOD INSECURITY: WITHIN THE PAST 12 MONTHS, THE FOOD YOU BOUGHT JUST DIDN'T LAST AND YOU DIDN'T HAVE MONEY TO GET MORE.: SOMETIMES TRUE

## 2024-10-23 SDOH — ECONOMIC STABILITY: INCOME INSECURITY: HOW HARD IS IT FOR YOU TO PAY FOR THE VERY BASICS LIKE FOOD, HOUSING, MEDICAL CARE, AND HEATING?: NOT VERY HARD

## 2024-10-23 ASSESSMENT — PATIENT HEALTH QUESTIONNAIRE - PHQ9
SUM OF ALL RESPONSES TO PHQ QUESTIONS 1-9: 0
1. LITTLE INTEREST OR PLEASURE IN DOING THINGS: NOT AT ALL
SUM OF ALL RESPONSES TO PHQ QUESTIONS 1-9: 0
SUM OF ALL RESPONSES TO PHQ9 QUESTIONS 1 & 2: 0
2. FEELING DOWN, DEPRESSED OR HOPELESS: NOT AT ALL

## 2024-10-23 NOTE — PROGRESS NOTES
[facility]  Internal Medicine Residency Clinic    Attending Physician Statement  I have discussed the case, including pertinent history and exam findings with the resident physician. I agree with the assessment, plan and orders as documented by the resident. I have reviewed the relevant PMHx, PSHx, FamHx, SocialHx, medications, and allergies and updated history as appropriate.    Patient presents for routine follow up of medical problems.   AISHWARYA completed  Discussed with PG3  Essential hypertension currently well-controlled/continue low-dose ACE inhibitor  Type 2 diabetes mellitus requiring insulin therapy/A1c at target  Continue current regimen  Bilateral hearing loss stable  Hyperlipidemia  LDL at 138 on high-dose statin therapy along with omega-3 fatty acids for triglyceride control  Continue current medical management  Follow-up labs every 6 months  Remainder of medical problems as per resident note.    Lorena Benitez MD  10/23/2024 8:27 AM

## 2024-10-23 NOTE — PATIENT INSTRUCTIONS
Dear Silvano Aldrich,    Thank you for coming to your appointment today. I hope all of your concerns have been addressed.     Please make sure to do the following:  Continue your medications as listed.  Please get labs and images before our next visit. You will be contacted if there is anything abnormal otherwise your results will be discussed on your next visit. You are welcome to call anytime and inquire about the results.     You will see you PCP in week of 4/7    Call for a sooner appointment if you develop any issues     Have a great day!    Sincerely,  Angus Baltazar M.D  10/23/2024  8:23 AM    [employer] Resources*  (Call [employer]/211 if need more resources.)       [employer] (CARTS):  What they offer: Public transportation for all of [facility]. Call at least 24 hours in advance to make reservation. Reduced rates for age 65 and over.   Phone Number: [phone] ext [phone]    [facility] TRANSPORTATION:  What they offer: No cost transportation for [place] residents aged 65 and disabled; weekly shopping schedule.  Phone: [phone] choose option for [facility]’s office    [facility] SERVICES TRANSPORTATION:  What they offer: Transportation for [facility] residents aged 60 and over who do not require assistance getting on or off vehicle. Service for medical appointments, congregate meal sites, grocery shopping or social service appointments for most of [facility]. Requires 2 week advanced notice   No cost or donation.  Phone: [phone]    [facility] TRANSPORTATION:  What they offer: Transportation for [facility] residents aged 60 and over or disabled on a weekly schedule for medical appointments, shopping, banking, etc. in [employer] and [employer]. No cost  Phone: [phone] [phone]    [facility] DEPARTMENT OF JOB AND FAMILY SERVICES (NET)  What they offer: no cost transportation for persons on traditional Medicaid  Phone

## 2025-04-22 ENCOUNTER — OFFICE VISIT (OUTPATIENT)
Dept: INTERNAL MEDICINE | Age: 40
End: 2025-04-22
Payer: MEDICARE

## 2025-04-22 VITALS
WEIGHT: 161.8 LBS | OXYGEN SATURATION: 97 % | BODY MASS INDEX: 29.77 KG/M2 | SYSTOLIC BLOOD PRESSURE: 103 MMHG | HEIGHT: 62 IN | DIASTOLIC BLOOD PRESSURE: 63 MMHG | RESPIRATION RATE: 18 BRPM | HEART RATE: 95 BPM | TEMPERATURE: 98.6 F

## 2025-04-22 DIAGNOSIS — E78.5 HYPERLIPIDEMIA, UNSPECIFIED HYPERLIPIDEMIA TYPE: ICD-10-CM

## 2025-04-22 DIAGNOSIS — M25.50 ARTHRALGIA, UNSPECIFIED JOINT: ICD-10-CM

## 2025-04-22 DIAGNOSIS — I10 PRIMARY HYPERTENSION: ICD-10-CM

## 2025-04-22 DIAGNOSIS — E11.65 TYPE 2 DIABETES MELLITUS WITH HYPERGLYCEMIA, UNSPECIFIED WHETHER LONG TERM INSULIN USE (HCC): ICD-10-CM

## 2025-04-22 PROCEDURE — G8427 DOCREV CUR MEDS BY ELIG CLIN: HCPCS

## 2025-04-22 PROCEDURE — 3046F HEMOGLOBIN A1C LEVEL >9.0%: CPT

## 2025-04-22 PROCEDURE — 99212 OFFICE O/P EST SF 10 MIN: CPT

## 2025-04-22 PROCEDURE — G8419 CALC BMI OUT NRM PARAM NOF/U: HCPCS

## 2025-04-22 PROCEDURE — 99213 OFFICE O/P EST LOW 20 MIN: CPT

## 2025-04-22 PROCEDURE — 3074F SYST BP LT 130 MM HG: CPT

## 2025-04-22 PROCEDURE — 2022F DILAT RTA XM EVC RTNOPTHY: CPT

## 2025-04-22 PROCEDURE — 1036F TOBACCO NON-USER: CPT

## 2025-04-22 PROCEDURE — 3078F DIAST BP <80 MM HG: CPT

## 2025-04-22 RX ORDER — ROSUVASTATIN CALCIUM 40 MG/1
40 TABLET, COATED ORAL EVERY EVENING
Qty: 90 TABLET | Refills: 1 | Status: SHIPPED | OUTPATIENT
Start: 2025-04-22

## 2025-04-22 RX ORDER — ICOSAPENT ETHYL 1 G/1
2 CAPSULE ORAL 2 TIMES DAILY
Qty: 120 CAPSULE | Refills: 2 | Status: SHIPPED | OUTPATIENT
Start: 2025-04-22

## 2025-04-22 RX ORDER — METFORMIN HYDROCHLORIDE 500 MG/1
1000 TABLET, EXTENDED RELEASE ORAL 2 TIMES DAILY WITH MEALS
Qty: 180 TABLET | Refills: 1 | Status: SHIPPED | OUTPATIENT
Start: 2025-04-22

## 2025-04-22 RX ORDER — ACETAMINOPHEN 500 MG
500 TABLET ORAL 4 TIMES DAILY PRN
Qty: 30 TABLET | Refills: 3 | Status: SHIPPED | OUTPATIENT
Start: 2025-04-22

## 2025-04-22 RX ORDER — LISINOPRIL 2.5 MG/1
2.5 TABLET ORAL DAILY
Qty: 90 TABLET | Refills: 1 | Status: SHIPPED | OUTPATIENT
Start: 2025-04-22

## 2025-04-22 SDOH — ECONOMIC STABILITY: FOOD INSECURITY: WITHIN THE PAST 12 MONTHS, YOU WORRIED THAT YOUR FOOD WOULD RUN OUT BEFORE YOU GOT MONEY TO BUY MORE.: NEVER TRUE

## 2025-04-22 SDOH — ECONOMIC STABILITY: FOOD INSECURITY: WITHIN THE PAST 12 MONTHS, THE FOOD YOU BOUGHT JUST DIDN'T LAST AND YOU DIDN'T HAVE MONEY TO GET MORE.: NEVER TRUE

## 2025-04-22 ASSESSMENT — PATIENT HEALTH QUESTIONNAIRE - PHQ9
SUM OF ALL RESPONSES TO PHQ QUESTIONS 1-9: 0
1. LITTLE INTEREST OR PLEASURE IN DOING THINGS: NOT AT ALL
2. FEELING DOWN, DEPRESSED OR HOPELESS: NOT AT ALL

## 2025-04-22 NOTE — PROGRESS NOTES
[facility]  Internal Medicine Residency [facility]    Attending Physician Statement  I have discussed the case, including pertinent history and exam findings with the resident physician.  I agree with the assessment, plan and orders as documented by the resident. I have reviewed the relevant PMHx, PSHx, FamHx, SocialHx, medications, and allergies and updated history as appropriate.    Patient presents for routine follow up of medical problems.  Agree AWV all criteria and protocols met. Testing ordered as well as refills as appropriate.     Remainder of medical problems as per resident note.    sImael Jean Baptiste, DO  4/22/2025 2:44 PM

## 2025-04-22 NOTE — PROGRESS NOTES
Silvano Aldrich (:  1985) is a 40 y.o. male,Established patient, here for evaluation of the following chief complaint(s):  Follow-up         Assessment & Plan  Arthralgia, unspecified joint   Chronic, at goal (stable), continue current treatment plan    Orders:    acetaminophen (TYLENOL) 500 MG tablet; Take 1 tablet by mouth 4 times daily as needed for Pain    Primary hypertension   Chronic, at goal (stable), continue current treatment plan    Orders:    lisinopril (PRINIVIL;ZESTRIL) 2.5 MG tablet; Take 1 tablet by mouth daily    Type 2 diabetes mellitus with hyperglycemia, unspecified whether long term insulin use (HCC)   Chronic, at goal (stable), continue current treatment plan  - Repeat A1c prior to next visit  - Microalbumin prior to next visit    Orders:    metFORMIN (GLUCOPHAGE-XR) 500 MG extended release tablet; Take 2 tablets by mouth 2 times daily (with meals)    Hemoglobin A1C; Future    Albumin/Creatinine Ratio, Urine; Future    Hyperlipidemia, unspecified hyperlipidemia type   Chronic, at goal (stable), continue current treatment plan  - Repeat lipid panel prior to next visit    Orders:    Lipid, Fasting; Future    Icosapent Ethyl (VASCEPA) 1 g CAPS capsule; Take 2 capsules by mouth 2 times daily    rosuvastatin (CRESTOR) 40 MG tablet; Take 1 tablet by mouth every evening      Return in about 6 months (around 10/22/2025).       Subjective   HPI  40-year-old male with past medical history of HTN, HLD, T2DM presents for annual visit follow-up.  Patient reports no bothersome symptoms today.  Patient is doing well.  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 He is taking medications as directed.  Patient states he takes his blood sugars daily and they have been around 106.  He also takes his blood pressure stating that it has been in the 110s systolic.  He denies any chest pain, shortness of breath, abdominal pain, N/V.      Review of Systems   Constitutional:  Negative for chills and fever.   HENT:

## 2025-04-22 NOTE — ASSESSMENT & PLAN NOTE
Chronic, at goal (stable), continue current treatment plan  - Repeat A1c prior to next visit  - Microalbumin prior to next visit    Orders:    metFORMIN (GLUCOPHAGE-XR) 500 MG extended release tablet; Take 2 tablets by mouth 2 times daily (with meals)    Hemoglobin A1C; Future    Albumin/Creatinine Ratio, Urine; Future

## 2025-04-22 NOTE — ASSESSMENT & PLAN NOTE
Chronic, at goal (stable), continue current treatment plan    Orders:    lisinopril (PRINIVIL;ZESTRIL) 2.5 MG tablet; Take 1 tablet by mouth daily

## 2025-04-22 NOTE — ASSESSMENT & PLAN NOTE
Chronic, at goal (stable), continue current treatment plan  - Repeat lipid panel prior to next visit    Orders:    Lipid, Fasting; Future    Icosapent Ethyl (VASCEPA) 1 g CAPS capsule; Take 2 capsules by mouth 2 times daily    rosuvastatin (CRESTOR) 40 MG tablet; Take 1 tablet by mouth every evening

## 2025-04-22 NOTE — PATIENT INSTRUCTIONS
Dear Silvano Aldrich,    Thank you for coming to your appointment at [facility].    You were seen today for annual visit. Please continue your medications as directed. We have placed orders for you to get lab work at your convenience before the next visit.     Please make sure to do the following:    - Continue medications as listed and contact our office if medications are unavailable at the pharmacy.    - Complete any ordered lab work as instructed.    - If a referral was placed to another doctor's office, please contact our office in 5 business days if you have not heard from that office regarding the referral.    - If any imaging test was ordered at today's visit (ultrasound, CT scan, or MRI), expect a phone call to schedule in the next 5 business days. You may also call [facility] Imaging Scheduling department at [phone] to schedule.    Contact our office if you develop any new or worsening symptoms or if you have any questions regarding today's visit.    We aim to address your healthcare needs to your satisfaction!    Sincerely,  Sameer Hines DO  4/22/2025  2:48 PM

## 2025-04-30 ENCOUNTER — TELEPHONE (OUTPATIENT)
Dept: INTERNAL MEDICINE | Age: 40
End: 2025-04-30

## 2025-04-30 NOTE — TELEPHONE ENCOUNTER
Called patient to schedule 6 month follow up appointment. No answer. VM left to call office to schedule appointment.

## 2025-05-13 ENCOUNTER — TELEPHONE (OUTPATIENT)
Dept: INTERNAL MEDICINE | Age: 40
End: 2025-05-13

## 2025-05-13 NOTE — TELEPHONE ENCOUNTER
Left a voicemail message for patient to call the office to get scheduled for the 6 month follow up appointment. (Due October 2025)